# Patient Record
Sex: MALE | Race: WHITE | NOT HISPANIC OR LATINO | Employment: OTHER | URBAN - METROPOLITAN AREA
[De-identification: names, ages, dates, MRNs, and addresses within clinical notes are randomized per-mention and may not be internally consistent; named-entity substitution may affect disease eponyms.]

---

## 2018-08-09 ENCOUNTER — CONVERSION ENCOUNTER (OUTPATIENT)
Dept: CARDIOLOGY | Facility: CLINIC | Age: 58
End: 2018-08-09

## 2018-08-09 ENCOUNTER — OFFICE VISIT CONVERTED (OUTPATIENT)
Dept: CARDIOLOGY | Facility: CLINIC | Age: 58
End: 2018-08-09
Attending: INTERNAL MEDICINE

## 2018-08-23 ENCOUNTER — OFFICE VISIT CONVERTED (OUTPATIENT)
Dept: CARDIOLOGY | Facility: CLINIC | Age: 58
End: 2018-08-23
Attending: INTERNAL MEDICINE

## 2019-01-22 ENCOUNTER — OFFICE VISIT CONVERTED (OUTPATIENT)
Dept: FAMILY MEDICINE CLINIC | Facility: CLINIC | Age: 59
End: 2019-01-22
Attending: NURSE PRACTITIONER

## 2019-04-24 ENCOUNTER — OFFICE VISIT CONVERTED (OUTPATIENT)
Dept: CARDIOLOGY | Facility: CLINIC | Age: 59
End: 2019-04-24
Attending: INTERNAL MEDICINE

## 2019-04-24 ENCOUNTER — CONVERSION ENCOUNTER (OUTPATIENT)
Dept: CARDIOLOGY | Facility: CLINIC | Age: 59
End: 2019-04-24

## 2019-06-13 ENCOUNTER — CONVERSION ENCOUNTER (OUTPATIENT)
Dept: FAMILY MEDICINE CLINIC | Facility: CLINIC | Age: 59
End: 2019-06-13

## 2019-06-13 ENCOUNTER — OFFICE VISIT CONVERTED (OUTPATIENT)
Dept: FAMILY MEDICINE CLINIC | Facility: CLINIC | Age: 59
End: 2019-06-13
Attending: NURSE PRACTITIONER

## 2019-10-31 ENCOUNTER — HOSPITAL ENCOUNTER (OUTPATIENT)
Dept: OTHER | Facility: HOSPITAL | Age: 59
Discharge: HOME | End: 2019-10-31
Attending: INTERNAL MEDICINE

## 2019-10-31 LAB
ALBUMIN SERPL-MCNC: 4.3 G/DL (ref 3.5–5)
ALBUMIN/GLOB SERPL: 1.4 {RATIO} (ref 1.4–2.6)
ALP SERPL-CCNC: 88 U/L (ref 56–119)
ALT SERPL-CCNC: 39 U/L (ref 10–40)
ANION GAP SERPL CALC-SCNC: 18 MMOL/L (ref 8–19)
AST SERPL-CCNC: 28 U/L (ref 15–50)
BILIRUB SERPL-MCNC: 0.56 MG/DL (ref 0.2–1.3)
BUN SERPL-MCNC: 10 MG/DL (ref 5–25)
BUN/CREAT SERPL: 9 {RATIO} (ref 6–20)
CALCIUM SERPL-MCNC: 10 MG/DL (ref 8.7–10.4)
CHLORIDE SERPL-SCNC: 98 MMOL/L (ref 99–111)
CHOLEST SERPL-MCNC: 228 MG/DL (ref 107–200)
CHOLEST/HDLC SERPL: 2.9 {RATIO} (ref 3–6)
CONV CO2: 26 MMOL/L (ref 22–32)
CONV TOTAL PROTEIN: 7.3 G/DL (ref 6.3–8.2)
CREAT UR-MCNC: 1.07 MG/DL (ref 0.7–1.2)
GFR SERPLBLD BASED ON 1.73 SQ M-ARVRAT: >60 ML/MIN/{1.73_M2}
GLOBULIN UR ELPH-MCNC: 3 G/DL (ref 2–3.5)
GLUCOSE SERPL-MCNC: 126 MG/DL (ref 70–99)
HDLC SERPL-MCNC: 79 MG/DL (ref 40–60)
LDLC SERPL CALC-MCNC: 131 MG/DL (ref 70–100)
OSMOLALITY SERPL CALC.SUM OF ELEC: 285 MOSM/KG (ref 273–304)
POTASSIUM SERPL-SCNC: 5 MMOL/L (ref 3.5–5.3)
SODIUM SERPL-SCNC: 137 MMOL/L (ref 135–147)
TRIGL SERPL-MCNC: 89 MG/DL (ref 40–150)
VLDLC SERPL-MCNC: 18 MG/DL (ref 5–37)

## 2019-11-06 ENCOUNTER — OFFICE VISIT CONVERTED (OUTPATIENT)
Dept: CARDIOLOGY | Facility: CLINIC | Age: 59
End: 2019-11-06
Attending: INTERNAL MEDICINE

## 2020-01-29 ENCOUNTER — OFFICE VISIT CONVERTED (OUTPATIENT)
Dept: FAMILY MEDICINE CLINIC | Facility: CLINIC | Age: 60
End: 2020-01-29
Attending: FAMILY MEDICINE

## 2020-01-30 ENCOUNTER — HOSPITAL ENCOUNTER (OUTPATIENT)
Dept: OTHER | Facility: HOSPITAL | Age: 60
Discharge: HOME | End: 2020-01-30
Attending: FAMILY MEDICINE

## 2020-02-13 ENCOUNTER — OFFICE VISIT CONVERTED (OUTPATIENT)
Dept: FAMILY MEDICINE CLINIC | Facility: CLINIC | Age: 60
End: 2020-02-13
Attending: FAMILY MEDICINE

## 2020-02-26 ENCOUNTER — HOSPITAL ENCOUNTER (OUTPATIENT)
Dept: MRI IMAGING | Facility: HOSPITAL | Age: 60
Discharge: HOME | End: 2020-02-26
Attending: FAMILY MEDICINE

## 2020-02-27 ENCOUNTER — OFFICE VISIT CONVERTED (OUTPATIENT)
Dept: ORTHOPEDIC SURGERY | Facility: CLINIC | Age: 60
End: 2020-02-27
Attending: PHYSICIAN ASSISTANT

## 2020-02-27 ENCOUNTER — CONVERSION ENCOUNTER (OUTPATIENT)
Dept: ORTHOPEDIC SURGERY | Facility: CLINIC | Age: 60
End: 2020-02-27

## 2020-03-04 ENCOUNTER — HOSPITAL ENCOUNTER (OUTPATIENT)
Dept: FAMILY MEDICINE CLINIC | Facility: CLINIC | Age: 60
Discharge: HOME | End: 2020-03-04
Attending: FAMILY MEDICINE

## 2020-03-04 LAB
ALBUMIN SERPL-MCNC: 3.7 G/DL (ref 3.5–5)
ALBUMIN/GLOB SERPL: 1.2 {RATIO} (ref 1.4–2.6)
ALP SERPL-CCNC: 76 U/L (ref 56–119)
ALT SERPL-CCNC: 43 U/L (ref 10–40)
ANION GAP SERPL CALC-SCNC: 16 MMOL/L (ref 8–19)
AST SERPL-CCNC: 30 U/L (ref 15–50)
BASOPHILS # BLD AUTO: 0.03 10*3/UL (ref 0–0.2)
BASOPHILS NFR BLD AUTO: 0.6 % (ref 0–3)
BILIRUB SERPL-MCNC: 0.31 MG/DL (ref 0.2–1.3)
BUN SERPL-MCNC: 13 MG/DL (ref 5–25)
BUN/CREAT SERPL: 14 {RATIO} (ref 6–20)
CALCIUM SERPL-MCNC: 9.6 MG/DL (ref 8.7–10.4)
CHLORIDE SERPL-SCNC: 101 MMOL/L (ref 99–111)
CHOLEST SERPL-MCNC: 216 MG/DL (ref 107–200)
CHOLEST/HDLC SERPL: 2.6 {RATIO} (ref 3–6)
CONV ABS IMM GRAN: 0.02 10*3/UL (ref 0–0.2)
CONV CO2: 26 MMOL/L (ref 22–32)
CONV IMMATURE GRAN: 0.4 % (ref 0–1.8)
CONV TOTAL PROTEIN: 6.8 G/DL (ref 6.3–8.2)
CREAT UR-MCNC: 0.9 MG/DL (ref 0.7–1.2)
DEPRECATED RDW RBC AUTO: 38.6 FL (ref 35.1–43.9)
EOSINOPHIL # BLD AUTO: 0.12 10*3/UL (ref 0–0.7)
EOSINOPHIL # BLD AUTO: 2.4 % (ref 0–7)
ERYTHROCYTE [DISTWIDTH] IN BLOOD BY AUTOMATED COUNT: 11.8 % (ref 11.6–14.4)
GFR SERPLBLD BASED ON 1.73 SQ M-ARVRAT: >60 ML/MIN/{1.73_M2}
GLOBULIN UR ELPH-MCNC: 3.1 G/DL (ref 2–3.5)
GLUCOSE SERPL-MCNC: 132 MG/DL (ref 70–99)
HCT VFR BLD AUTO: 49.3 % (ref 42–52)
HDLC SERPL-MCNC: 82 MG/DL (ref 40–60)
HGB BLD-MCNC: 16 G/DL (ref 14–18)
LDLC SERPL CALC-MCNC: 119 MG/DL (ref 70–100)
LYMPHOCYTES # BLD AUTO: 0.98 10*3/UL (ref 1–5)
LYMPHOCYTES NFR BLD AUTO: 19.3 % (ref 20–45)
MCH RBC QN AUTO: 29.4 PG (ref 27–31)
MCHC RBC AUTO-ENTMCNC: 32.5 G/DL (ref 33–37)
MCV RBC AUTO: 90.5 FL (ref 80–96)
MONOCYTES # BLD AUTO: 0.52 10*3/UL (ref 0.2–1.2)
MONOCYTES NFR BLD AUTO: 10.2 % (ref 3–10)
NEUTROPHILS # BLD AUTO: 3.41 10*3/UL (ref 2–8)
NEUTROPHILS NFR BLD AUTO: 67.1 % (ref 30–85)
NRBC CBCN: 0 % (ref 0–0.7)
OSMOLALITY SERPL CALC.SUM OF ELEC: 288 MOSM/KG (ref 273–304)
PLATELET # BLD AUTO: 204 10*3/UL (ref 130–400)
PMV BLD AUTO: 10.2 FL (ref 9.4–12.4)
POTASSIUM SERPL-SCNC: 4.8 MMOL/L (ref 3.5–5.3)
PSA SERPL-MCNC: 0.95 NG/ML (ref 0–4)
RBC # BLD AUTO: 5.45 10*6/UL (ref 4.7–6.1)
SODIUM SERPL-SCNC: 138 MMOL/L (ref 135–147)
TRIGL SERPL-MCNC: 73 MG/DL (ref 40–150)
VLDLC SERPL-MCNC: 15 MG/DL (ref 5–37)
WBC # BLD AUTO: 5.08 10*3/UL (ref 4.8–10.8)

## 2020-03-11 ENCOUNTER — OFFICE VISIT CONVERTED (OUTPATIENT)
Dept: CARDIOLOGY | Facility: CLINIC | Age: 60
End: 2020-03-11
Attending: INTERNAL MEDICINE

## 2020-06-09 LAB — SARS-COV-2 RNA SPEC QL NAA+PROBE: NOT DETECTED

## 2020-06-10 ENCOUNTER — HOSPITAL ENCOUNTER (OUTPATIENT)
Dept: PERIOP | Facility: HOSPITAL | Age: 60
Setting detail: HOSPITAL OUTPATIENT SURGERY
Discharge: HOME | End: 2020-06-10
Attending: ORTHOPAEDIC SURGERY

## 2020-06-25 ENCOUNTER — CONVERSION ENCOUNTER (OUTPATIENT)
Dept: ORTHOPEDIC SURGERY | Facility: CLINIC | Age: 60
End: 2020-06-25

## 2020-06-25 ENCOUNTER — OFFICE VISIT CONVERTED (OUTPATIENT)
Dept: ORTHOPEDIC SURGERY | Facility: CLINIC | Age: 60
End: 2020-06-25
Attending: PHYSICIAN ASSISTANT

## 2020-07-23 ENCOUNTER — OFFICE VISIT CONVERTED (OUTPATIENT)
Dept: ORTHOPEDIC SURGERY | Facility: CLINIC | Age: 60
End: 2020-07-23
Attending: PHYSICIAN ASSISTANT

## 2020-09-03 ENCOUNTER — OFFICE VISIT CONVERTED (OUTPATIENT)
Dept: ORTHOPEDIC SURGERY | Facility: CLINIC | Age: 60
End: 2020-09-03
Attending: PHYSICIAN ASSISTANT

## 2020-10-09 ENCOUNTER — HOSPITAL ENCOUNTER (OUTPATIENT)
Dept: OTHER | Facility: HOSPITAL | Age: 60
Discharge: HOME | End: 2020-10-09
Attending: INTERNAL MEDICINE

## 2020-10-09 LAB
ALBUMIN SERPL-MCNC: 4.1 G/DL (ref 3.5–5)
ALBUMIN/GLOB SERPL: 1.4 {RATIO} (ref 1.4–2.6)
ALP SERPL-CCNC: 105 U/L (ref 56–119)
ALT SERPL-CCNC: 39 U/L (ref 10–40)
ANION GAP SERPL CALC-SCNC: 16 MMOL/L (ref 8–19)
AST SERPL-CCNC: 37 U/L (ref 15–50)
BILIRUB SERPL-MCNC: 0.35 MG/DL (ref 0.2–1.3)
BUN SERPL-MCNC: 12 MG/DL (ref 5–25)
BUN/CREAT SERPL: 11 {RATIO} (ref 6–20)
CALCIUM SERPL-MCNC: 9.8 MG/DL (ref 8.7–10.4)
CHLORIDE SERPL-SCNC: 100 MMOL/L (ref 99–111)
CHOLEST SERPL-MCNC: 169 MG/DL (ref 107–200)
CHOLEST/HDLC SERPL: 1.7 {RATIO} (ref 3–6)
CONV CO2: 22 MMOL/L (ref 22–32)
CONV TOTAL PROTEIN: 7 G/DL (ref 6.3–8.2)
CREAT UR-MCNC: 1.09 MG/DL (ref 0.7–1.2)
GFR SERPLBLD BASED ON 1.73 SQ M-ARVRAT: >60 ML/MIN/{1.73_M2}
GLOBULIN UR ELPH-MCNC: 2.9 G/DL (ref 2–3.5)
GLUCOSE SERPL-MCNC: 120 MG/DL (ref 70–99)
HDLC SERPL-MCNC: 100 MG/DL (ref 40–60)
LDLC SERPL CALC-MCNC: 59 MG/DL (ref 70–100)
OSMOLALITY SERPL CALC.SUM OF ELEC: 277 MOSM/KG (ref 273–304)
POTASSIUM SERPL-SCNC: 5.2 MMOL/L (ref 3.5–5.3)
SODIUM SERPL-SCNC: 133 MMOL/L (ref 135–147)
TRIGL SERPL-MCNC: 49 MG/DL (ref 40–150)
VLDLC SERPL-MCNC: 10 MG/DL (ref 5–37)

## 2020-10-16 ENCOUNTER — OFFICE VISIT CONVERTED (OUTPATIENT)
Dept: CARDIOLOGY | Facility: CLINIC | Age: 60
End: 2020-10-16
Attending: INTERNAL MEDICINE

## 2020-10-30 ENCOUNTER — TELEMEDICINE CONVERTED (OUTPATIENT)
Dept: ORTHOPEDIC SURGERY | Facility: CLINIC | Age: 60
End: 2020-10-30
Attending: PHYSICIAN ASSISTANT

## 2021-03-17 ENCOUNTER — CONVERSION ENCOUNTER (OUTPATIENT)
Dept: FAMILY MEDICINE CLINIC | Facility: CLINIC | Age: 61
End: 2021-03-17

## 2021-03-17 ENCOUNTER — OFFICE VISIT CONVERTED (OUTPATIENT)
Dept: FAMILY MEDICINE CLINIC | Facility: CLINIC | Age: 61
End: 2021-03-17
Attending: FAMILY MEDICINE

## 2021-05-13 ENCOUNTER — OFFICE VISIT CONVERTED (OUTPATIENT)
Dept: CARDIOLOGY | Facility: CLINIC | Age: 61
End: 2021-05-13
Attending: INTERNAL MEDICINE

## 2021-05-13 NOTE — PROGRESS NOTES
"   Progress Note      Patient Name: Carlos Manuel Sanchez   Patient ID: [id number]   Sex: Male   YOB: 1960    Primary Care Provider: Kurt Arango DO   Referring Provider: Jeanie NUNEZ    Visit Date: October 16, 2020    Provider: Haris Clements MD   Location: [facility]   Location Address: [address] [place] [address], [address]   [place]  [id number]   Location Phone: [phone]          Chief Complaint  · Coronary artery disease       History Of Present Illness  REFERRING CARE PROVIDER: Kurt Arango DO   Carlos Manuel Sanchez is a 59 year old /White gentleman with known coronary artery disease, previous stent, dyslipidemia, who denies any complaints or problems.   PAST MEDICAL HISTORY: Coronary artery disease with stent in 2000; dyslipidemia; hypertension.   PSYCHOSOCIAL HISTORY: He drinks a moderate amount of alcohol and never used tobacco.   CURRENT MEDICATIONS: include Repatha 140 mg biweekly; Rosuvastatin 20 mg daily; Atenolol 25 mg daily; Lisinopril 20 mg daily; ASA 81 mg daily. The dosage and frequency of the medications were reviewed with the patient.       Review of Systems  · Cardiovascular  o Denies  o : palpitations (fast, fluttering, or skipping beats), swelling (feet, ankles, hands), shortness of breath while walking or lying flat, chest pain or angina pectoris   · Respiratory  o Denies  o : chronic or frequent cough, asthma or wheezing      Vitals  Date Time BP Position Site L\R Cuff Size HR RR TEMP (F) WT  HT  BMI kg/m2 BSA m2 O2 Sat FR L/min FiO2 HC       10/16/2020 08:24 /84 Sitting       203lbs 0oz 5'  10\" 29.13 2.13             Physical Examination  · Constitutional  o Appearance  o : Awake, alert, in no acute distress.   · Eyes  o Conjunctivae  o : Normal.  · Ears, Nose, Mouth and Throat  o Oral Cavity  o :   § Oral Mucosa  § : Normal.  · Neck  o Inspection/Palpation  o : No JVD. Good carotid upstroke. No thyromegaly.  · Respiratory  o Respiratory  o : Good " respiratory effort. Clear to percussion and auscultation.  · Cardiovascular  o Heart  o :   § Auscultation of Heart  § : S1, S2 normal. Regular rate and rhythm without murmurs, gallops, or rubs.  o Peripheral Vascular System  o :   § Extremities  § : Good femoral and pedal pulses. No pedal edema.  · Gastrointestinal  o Abdominal Examination  o : Soft. No tenderness or masses felt. No hepatosplenomegaly. Abdominal aorta is not palpable.     EKG was performed in the office today.  Results:         normal sinus rhythm with some left anterior fascicular block.  Comparison:   No change from prior EKGs.    LDL 59, .  AST 37, ALT 39.           Assessment     ASSESSMENT AND PLAN:    1.  Coronary artery disease with previous stenting:  No angina and on chronic ASA 81 mg once a day.  2.  Hyperlipidemia:  On both statin and Repatha and tolerating well.  LDL at goal.    MD Sugey Meadows/shaniqua         This note was transcribed by Akosua Forrest.  shaniqua/sugey   The above service was transcribed by Akosua Forrest, and I attest to the accuracy of the note.  SUGEY.               Electronically Signed by: Akosua Forrest-, -Author on October 19, 2020 10:55:38 AM  Electronically Co-signed by: Haris Clements MD -Reviewer on October 22, 2020 08:02:17 AM

## 2021-05-13 NOTE — PROGRESS NOTES
Progress Note      Patient Name: Carlos Manuel Sanchez   Patient ID: [id number]   Sex: Male   YOB: 1960    Primary Care Provider: Kurt Arango DO   Referring Provider: Kurt Arango DO    Visit Date: September 3, 2020    Provider: Molly Ovalles PA-C   Location: [facility]   Location Address: [address] [place] [address]  [id number]   Location Phone: [phone]          Chief Complaint  · Follow up left shoulder arthroscopy      History Of Present Illness  Carlos Manuel Sanchez is a 59 year old /White male who presents today to [facility].      He is s/p left arthroscopic SAD/DC mini open RCR 6/10/20 by Dr. James. He is doing well. He is compliant with sling and PT. He states pain has improved since last visit. He denies wound complications, numbness/tingling. He is making progress in PT with ROM and strength.               Past Medical History  Heart Disease; HLD (hyperlipidemia); HTN (hypertension); Hyperlipemia; Hypertension         Past Surgical History  Appendectomy; Stent placment         Medication List  Aspir-81 81 mg oral tablet,delayed release (DR/EC); atenolol 25 mg oral tablet; Claritin 10 mg oral tablet; lisinopril 20 mg oral tablet; Norco 7.5-325 mg oral tablet; Repatha SureClick 140 mg/mL subcutaneous pen injector; rosuvastatin 20 mg oral tablet; Zetia 10 mg oral tablet         Allergy List  NO KNOWN DRUG ALLERGIES; NO KNOWN DRUG ALLERGIES       Allergies Reconciled  Family Medical History  Heart Disease; Cancer, Unspecified; Lung cancer         Social History  Alcohol Use (Current some day); lives with spouse; .; Recreational Drug Use (Never); Tobacco (Never); Working         Review of Systems  · Constitutional  o Denies  o : fever, chills, weight loss  · Cardiovascular  o Denies  o : chest pain, shortness of breath  · Gastrointestinal  o Denies  o : liver disease, heartburn, nausea, blood in stools  · Genitourinary  o Denies  o : painful  "urination, blood in urine  · Integument  o Denies  o : rash, itching  · Neurologic  o Denies  o : headache, weakness, loss of consciousness  · Musculoskeletal  o Admits  o : painful, swollen joints  · Psychiatric  o Denies  o : drug/alcohol addiction, anxiety, depression      Vitals  Date Time BP Position Site L\R Cuff Size HR RR TEMP (F) WT  HT  BMI kg/m2 BSA m2 O2 Sat        09/03/2020 08:41 AM      64 - R   203lbs 16oz 5'  10\" 29.27 2.14 94 %          Physical Examination  · Constitutional  o Appearance  o : well developed, well-nourished, no obvious deformities present  · Head and Face  o Head  o :   § Inspection  § : normocephalic  o Face  o :   § Inspection  § : no facial lesions  · Eyes  o Conjunctivae  o : conjunctivae normal  o Sclerae  o : sclerae white  · Ears, Nose, Mouth and Throat  o Ears  o :   § External Ears  § : appearance within normal limits  § Hearing  § : intact  o Nose  o :   § External Nose  § : appearance normal  · Neck  o Inspection/Palpation  o : normal appearance  o Range of Motion  o : full range of motion  · Respiratory  o Respiratory Effort  o : breathing unlabored  o Inspection of Chest  o : normal appearance  o Auscultation of Lungs  o : no audible wheezing or rales  · Cardiovascular  o Heart  o : regular rate  · Gastrointestinal  o Abdominal Examination  o : soft and non-tender  · Skin and Subcutaneous Tissue  o General Inspection  o : intact, no rashes  · Psychiatric  o General  o : Alert and oriented x3  o Judgement and Insight  o : judgment and insight intact  o Mood and Affect  o : mood normal, affect appropriate  · Left Shoulder  o Inspection  o : Well healed incisions. Forward flexion 100 degrees. Abduction 90 degrees. IR to back pocket. ER 30 degrees. Strength decreased. Neurovascularly intact.           Assessment  · Left Aftercare following surgery of the muskuloskeletal system     V54.81      Plan  · Medications  o Medications have been Reconciled  o Transition of Care " or Provider Policy  · Instructions  o Reviewed the patient's Past Medical, Social, and Family history as well as the ROS at today's visit, no changes.  o Call or return if worsening symptoms.  o Continue PT. Follow up 6-8 weeks.   o Electronically Identified Patient Education Materials Provided Electronically  · Referrals  o ID: [id number] Date: 02/26/2020 Type: Inbound  Specialty: Orthopedic Surgery            Electronically Signed by: KORI Holden-MARIA LUISA -Author on September 3, 2020 09:04:53 AM  Electronically Co-signed by: Lisa James MD -Reviewer on September 8, 2020 08:18:34 AM

## 2021-05-13 NOTE — PROGRESS NOTES
Progress Note      Patient Name: Carlos Manuel Sanchez   Patient ID: [id number]   Sex: Male   YOB: 1960    Primary Care Provider: Kurt Arango DO   Referring Provider: Kurt Arango DO    Visit Date: October 30, 2020    Provider: Molly Ovalles PA-C   Location: [facility]   Location Address: [address] [place] [address]  [id number]   Location Phone: [phone]          Chief Complaint  · Follow up left shoulder arthroscopy      History Of Present Illness  Video Conferencing Visit  Carlos Manuel aSnchez is a 60 year old /White male who is presenting for evaluation via video conferencing via [employer]. Verbal consent obtained before beginning visit.   The following staff were present during this visit: Molly Ovalles PA-C   Carlos Manuel Sanchez is a 60 year old /White male who presents today to [facility].      He is s/p left arthroscopic SAD/DC mini open RCR 6/10/20 by Dr. James. He has made a favorable recovery. He has no pain. He has been compliant with PT. He is happy with his outcome. 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       Past Medical History  Heart Disease; HLD (hyperlipidemia); HTN (hypertension); Hyperlipemia; Hypertension         Past Surgical History  Appendectomy; Stent placment         Medication List  Aspir-81 81 mg oral tablet,delayed release (DR/EC); atenolol 25 mg oral tablet; Claritin 10 mg oral tablet; lisinopril 20 mg oral tablet; Norco 7.5-325 mg oral tablet; Repatha SureClick 140 mg/mL subcutaneous pen injector; rosuvastatin 20 mg oral tablet; Zetia 10 mg oral tablet         Allergy List  NO KNOWN DRUG ALLERGIES; NO KNOWN DRUG ALLERGIES       Allergies Reconciled  Family Medical History  Heart Disease; Cancer, Unspecified; Lung cancer         Social History  Alcohol Use (Current some day); lives with spouse; .; Recreational Drug Use (Never); Tobacco (Never); Working         Review of  "Systems  · Constitutional  o Denies  o : fever, chills, weight loss  · Cardiovascular  o Denies  o : chest pain, shortness of breath  · Gastrointestinal  o Denies  o : liver disease, heartburn, nausea, blood in stools  · Genitourinary  o Denies  o : painful urination, blood in urine  · Integument  o Denies  o : rash, itching  · Neurologic  o Denies  o : headache, weakness, loss of consciousness  · Musculoskeletal  o Denies  o : painful, swollen joints  · Psychiatric  o Denies  o : drug/alcohol addiction, anxiety, depression      Vitals  Date Time BP Position Site L\R Cuff Size HR RR TEMP (F) WT  HT  BMI kg/m2 BSA m2 O2 Sat FR L/min FiO2 HC       10/30/2020 08:46 AM         194lbs 16oz 5'  10\" 27.98 2.09             Physical Examination  · Constitutional  o Appearance  o : well developed, well-nourished, no obvious deformities present  · Head and Face  o Head  o :   § Inspection  § : normocephalic  o Face  o :   § Inspection  § : no facial lesions  · Eyes  o Conjunctivae  o : conjunctivae normal  o Sclerae  o : sclerae white  · Ears, Nose, Mouth and Throat  o Ears  o :   § External Ears  § : appearance within normal limits  § Hearing  § : intact  o Nose  o :   § External Nose  § : appearance normal  · Neck  o Inspection/Palpation  o : normal appearance  o Range of Motion  o : full range of motion  · Respiratory  o Respiratory Effort  o : breathing unlabored  o Inspection of Chest  o : normal appearance  o Auscultation of Lungs  o : no audible wheezing or rales  · Gastrointestinal  o Abdominal Examination  o : no obvious deformity   · Skin and Subcutaneous Tissue  o General Inspection  o : intact, no rashes  · Psychiatric  o General  o : Alert and oriented x3  o Judgement and Insight  o : judgment and insight intact  o Mood and Affect  o : mood normal, affect appropriate  · Left Shoulder  o Inspection  o : Well healed incisions. Forward flexion 170 degrees. Abduction 170 degrees. Full IR/E compared to opposite side. "           Assessment  · History of repair of left rotator cuff     V15.29/Z98.890      Plan  · Medications  o Medications have been Reconciled  o Transition of Care or Provider Policy  · Instructions  o Reviewed the patient's Past Medical, Social, and Family history as well as the ROS at today's visit, no changes.  o Call or return if worsening symptoms.  o Patient may d/c PT, with understanding of continuing prescribed HEP. Discussed precautions. Increase activity gradually as tolerated. Follow up PRN.   o Patient may return to work, full duty without restrictions 11/2/20.   o Electronically Identified Patient Education Materials Provided Electronically            Electronically Signed by: CHARLINE HoldenC -Author on October 30, 2020 09:05:23 AM

## 2021-05-13 NOTE — PROGRESS NOTES
Progress Note      Patient Name: Carlos Manuel Sanchez   Patient ID: [id number]   Sex: Male   YOB: 1960    Primary Care Provider: Kurt Arango DO   Referring Provider: Kurt Arango DO    Visit Date: June 25, 2020    Provider: Molly Ovalles PA-C   Location: [facility]   Location Address: [address] [place] [address]  [id number]   Location Phone: [phone]          Chief Complaint  · Surgery follow up left shoulder arthroscopy      History Of Present Illness  Carlos Manuel Sanchez is a 59 year old /White male who presents today to [facility].      He is s/p left arthroscopic SAD/DC mini open RCR 6/10/20 by Dr. James. He is doing well. He is compliant with sling and PT.       Past Medical History  Heart Disease; HLD (hyperlipidemia); HTN (hypertension); Hyperlipemia; Hypertension         Past Surgical History  Appendectomy; Stent placment         Medication List  Aspir-81 81 mg oral tablet,delayed release (/EC); atenolol 25 mg oral tablet; Claritin 10 mg oral tablet; lisinopril 20 mg oral tablet; Repatha SureClick 140 mg/mL subcutaneous pen injector; rosuvastatin 20 mg oral tablet; Zetia 10 mg oral tablet         Allergy List  NO KNOWN DRUG ALLERGIES; NO KNOWN DRUG ALLERGIES       Allergies Reconciled  Family Medical History  Heart Disease; Cancer, Unspecified; Lung cancer         Social History  Alcohol Use (Current some day); lives with spouse; .; Recreational Drug Use (Never); Tobacco (Never); Working         Review of Systems  · Constitutional  o Denies  o : fever, chills, weight loss  · Cardiovascular  o Denies  o : chest pain, shortness of breath  · Gastrointestinal  o Denies  o : liver disease, heartburn, nausea, blood in stools  · Genitourinary  o Denies  o : painful urination, blood in urine  · Integument  o Denies  o : rash, itching  · Neurologic  o Denies  o : headache, weakness, loss of consciousness  · Musculoskeletal  o Admits  o : painful, swollen  "joints  · Psychiatric  o Denies  o : drug/alcohol addiction, anxiety, depression      Vitals  Date Time BP Position Site L\R Cuff Size HR RR TEMP (F) WT  HT  BMI kg/m2 BSA m2 O2 Sat HC       06/25/2020 11:04 AM      68 - R   198lbs 0oz 5'  10\" 28.41 2.11 97 %          Physical Examination  · Constitutional  o Appearance  o : well developed, well-nourished, no obvious deformities present  · Head and Face  o Head  o :   § Inspection  § : normocephalic  o Face  o :   § Inspection  § : no facial lesions  · Eyes  o Conjunctivae  o : conjunctivae normal  o Sclerae  o : sclerae white  · Ears, Nose, Mouth and Throat  o Ears  o :   § External Ears  § : appearance within normal limits  § Hearing  § : intact  o Nose  o :   § External Nose  § : appearance normal  · Neck  o Inspection/Palpation  o : normal appearance  o Range of Motion  o : full range of motion  · Respiratory  o Respiratory Effort  o : breathing unlabored  o Inspection of Chest  o : normal appearance  o Auscultation of Lungs  o : no audible wheezing or rales  · Cardiovascular  o Heart  o : regular rate  · Gastrointestinal  o Abdominal Examination  o : soft and non-tender  · Skin and Subcutaneous Tissue  o General Inspection  o : intact, no rashes  · Psychiatric  o General  o : Alert and oriented x3  o Judgement and Insight  o : judgment and insight intact  o Mood and Affect  o : mood normal, affect appropriate  · Left Shoulder  o Inspection  o : Well healed incisions. No signs of infection. Decreased ROM. Full ROM of elbow, wrist, digits. Neurovascularly intact.           Assessment  · Left Aftercare following surgery of the muskuloskeletal system     V54.81      Plan  · Medications  o Medications have been Reconciled  o Transition of Care or Provider Policy  · Instructions  o Reviewed the patient's Past Medical, Social, and Family history as well as the ROS at today's visit, no changes.  o Call or return if worsening symptoms.  o Continue PT. Follow up 4 weeks. "   o Electronically Identified Patient Education Materials Provided Electronically  · Referrals  o ID: [id number] Date: 02/26/2020 Type: Inbound  Specialty: Orthopedic Surgery            Electronically Signed by: Molly Ovalles PA-C -Author on June 25, 2020 11:36:39 AM  Electronically Co-signed by: Lisa James MD -Reviewer on June 25, 2020 12:04:50 PM

## 2021-05-13 NOTE — PROGRESS NOTES
Progress Note      Patient Name: Carlos Manuel Sanchez   Patient ID: [id number]   Sex: Male   YOB: 1960    Primary Care Provider: Kurt Arango DO   Referring Provider: Kurt Arango DO    Visit Date: July 23, 2020    Provider: Molly Ovalles PA-C   Location: [facility]   Location Address: [address] [place] [address]  [id number]   Location Phone: [phone]          Chief Complaint  · Follow up left shoulder arthroscopy      History Of Present Illness  Carlos Manuel Sanchez is a 59 year old /White male who presents today to [facility].      He is s/p left arthroscopic SAD/DC mini open RCR 6/10/20 by Dr. James. He is doing well. He is compliant with sling and PT. He states pain has improved since last visit. He denies wound complications, numbness/tingling.             Past Medical History  Heart Disease; HLD (hyperlipidemia); HTN (hypertension); Hyperlipemia; Hypertension         Past Surgical History  Appendectomy; Stent placment         Medication List  Aspir-81 81 mg oral tablet,delayed release (/EC); atenolol 25 mg oral tablet; Claritin 10 mg oral tablet; lisinopril 20 mg oral tablet; Norco 7.5-325 mg oral tablet; Repatha SureClick 140 mg/mL subcutaneous pen injector; rosuvastatin 20 mg oral tablet; Zetia 10 mg oral tablet         Allergy List  NO KNOWN DRUG ALLERGIES; NO KNOWN DRUG ALLERGIES       Allergies Reconciled  Family Medical History  Heart Disease; Cancer, Unspecified; Lung cancer         Social History  Alcohol Use (Current some day); lives with spouse; .; Recreational Drug Use (Never); Tobacco (Never); Working         Review of Systems  · Constitutional  o Denies  o : fever, chills, weight loss  · Cardiovascular  o Denies  o : chest pain, shortness of breath  · Gastrointestinal  o Denies  o : liver disease, heartburn, nausea, blood in stools  · Genitourinary  o Denies  o : painful urination, blood in urine  · Integument  o Denies  o : rash,  "itching  · Neurologic  o Denies  o : headache, weakness, loss of consciousness  · Musculoskeletal  o Admits  o : painful, swollen joints  · Psychiatric  o Denies  o : drug/alcohol addiction, anxiety, depression      Vitals  Date Time BP Position Site L\R Cuff Size HR RR TEMP (F) WT  HT  BMI kg/m2 BSA m2 O2 Sat        07/23/2020 09:49 AM      90 - R   203lbs 0oz 5'  10\" 29.13 2.13 96 %          Physical Examination  · Constitutional  o Appearance  o : well developed, well-nourished, no obvious deformities present  · Head and Face  o Head  o :   § Inspection  § : normocephalic  o Face  o :   § Inspection  § : no facial lesions  · Eyes  o Conjunctivae  o : conjunctivae normal  o Sclerae  o : sclerae white  · Ears, Nose, Mouth and Throat  o Ears  o :   § External Ears  § : appearance within normal limits  § Hearing  § : intact  o Nose  o :   § External Nose  § : appearance normal  · Neck  o Inspection/Palpation  o : normal appearance  o Range of Motion  o : full range of motion  · Respiratory  o Respiratory Effort  o : breathing unlabored  o Inspection of Chest  o : normal appearance  o Auscultation of Lungs  o : no audible wheezing or rales  · Cardiovascular  o Heart  o : regular rate  · Gastrointestinal  o Abdominal Examination  o : soft and non-tender  · Skin and Subcutaneous Tissue  o General Inspection  o : intact, no rashes  · Psychiatric  o General  o : Alert and oriented x3  o Judgement and Insight  o : judgment and insight intact  o Mood and Affect  o : mood normal, affect appropriate  · Left Shoulder  o Inspection  o : Incisions well healed. Limited ROM. Strength 3+/4. Neurovascularly intact.           Assessment  · Left Aftercare following surgery of the muskuloskeletal system     V54.81      Plan  · Medications  o Medications have been Reconciled  o Transition of Care or Provider Policy  · Instructions  o Reviewed the patient's Past Medical, Social, and Family history as well as the ROS at today's visit, " no changes.  o Call or return if worsening symptoms.  o continue PT. May dc sling. Follow up 6 weeks. Norco prescribed.   o Electronically Identified Patient Education Materials Provided Electronically  · Referrals  o ID: [id number] Date: 02/26/2020 Type: Inbound  Specialty: Orthopedic Surgery            Electronically Signed by: KORI Holden-C -Author on July 23, 2020 10:41:19 AM  Electronically Co-signed by: Lisa James MD -Reviewer on July 23, 2020 03:57:18 PM

## 2021-05-14 VITALS
SYSTOLIC BLOOD PRESSURE: 132 MMHG | HEART RATE: 68 BPM | WEIGHT: 205.56 LBS | DIASTOLIC BLOOD PRESSURE: 82 MMHG | HEIGHT: 70 IN | TEMPERATURE: 98 F | BODY MASS INDEX: 29.43 KG/M2 | OXYGEN SATURATION: 96 %

## 2021-05-14 VITALS — HEIGHT: 70 IN | WEIGHT: 195 LBS | BODY MASS INDEX: 27.92 KG/M2

## 2021-05-14 VITALS
BODY MASS INDEX: 29.06 KG/M2 | HEIGHT: 70 IN | SYSTOLIC BLOOD PRESSURE: 136 MMHG | DIASTOLIC BLOOD PRESSURE: 84 MMHG | WEIGHT: 203 LBS

## 2021-05-14 VITALS — WEIGHT: 204 LBS | HEIGHT: 70 IN | OXYGEN SATURATION: 94 % | BODY MASS INDEX: 29.2 KG/M2 | HEART RATE: 64 BPM

## 2021-05-14 NOTE — PROGRESS NOTES
Progress Note      Patient Name: Carlos Manuel Sanchez   Patient ID: [id number]   Sex: Male   YOB: 1960    Primary Care Provider: Kurt Arango DO   Referring Provider: Kurt Arango DO    Visit Date: March 17, 2021    Provider: Kurt Arango DO   Location: [facility] [place]   Location Address: [address] [place] [address], [address] [place] [address]  [id number]   Location Phone: [phone]          Chief Complaint  · mole on head      History Of Present Illness  Carlos Manuel Sanchez is a 60 year old /White male who presents for evaluation and treatment of:      Patient presents today for an acute visit.  He reports having a mole on his head that has been present for several years and has gotten larger.  Sometimes it hurts.  It does not bleed.  He also is having issues with erectile dysfunction.  He is requesting Viagra.  I have not seen him in about a year.  We discussed doing a follow-up in 3 months with labs done prior to next appointment.       Past Medical History  Heart Disease; HLD (hyperlipidemia); HTN (hypertension); Hyperlipemia; Hypertension         Past Surgical History  Appendectomy; Stent placment         Medication List  Aspir-81 81 mg oral tablet,delayed release (DR/EC); atenolol 25 mg oral tablet; lisinopril 20 mg oral tablet; Repatha SureClick 140 mg/mL subcutaneous pen injector; rosuvastatin 20 mg oral tablet         Allergy List  NO KNOWN DRUG ALLERGIES; NO KNOWN DRUG ALLERGIES       Allergies Reconciled  Family Medical History  Heart Disease; Cancer, Unspecified; Lung cancer         Social History  Alcohol Use (Current some day); lives with spouse; .; Recreational Drug Use (Never); Tobacco (Never); Working         Immunizations  Name Date Admin   Influenza 10/09/2020   Influenza Refused         Review of Systems     Gen: Denies any fever, chills, or weight changes  Skin: As discussed above       Vitals  Date Time BP Position Site L\R Cuff Size HR RR TEMP (F) WT  " HT  BMI kg/m2 BSA m2 O2 Sat FR L/min FiO2 HC       03/17/2021 03:03 /82 Sitting    68 - R  98 205lbs 9oz 5'  10\" 29.49 2.15 96 %            Physical Examination     General: AAO 3, no acute distress, pleasant  HEENT: Normocephalic, atraumatic  Skin: Hyperkeratotic flesh-colored lesion that is slightly scaly noted on the scalp.  It measures 9 x 5 mm.  Appears consistent with a seborrheic keratosis      Risk and benefits of cryotherapy for seborrheic keratosis were discussed with the patient prior to initiating the procedure.  Patient was given opportunity to ask questions.  Patient provided verbal consent to treatment.  The area of concern was treated with 4 rounds of cryotherapy.  Patient overall tolerated the procedure well.  There were no immediate complications.  A total of 1 lesion was treated.           Assessment  · Screening for prostate cancer     V76.44/Z12.5  · Seborrheic keratosis     702.19/L82.1  · Erectile dysfunction     607.84/N52.9  · CAD (coronary artery disease)     414.00/I25.10  · Medication monitoring encounter     V58.83/Z51.81  · Hypertension     401.9/I10  · HLD (hyperlipidemia)     272.4/E78.5      Plan  · Orders  o PSA Ultrasensitive, ANNUAL SCREENING [facility] (25785, ) - V76.44/Z12.5 - 06/17/2021  o CBC with Auto Diff [facility] (04130) - V58.83/Z51.81, 401.9/I10 - 06/17/2021  o CMP [facility] (44552) - V58.83/Z51.81, 401.9/I10 - 06/17/2021  o Lipid Panel [facility] (17960) - V58.83/Z51.81, 401.9/I10 - 06/17/2021  o ACO-39: Current medications updated and reviewed (1159F, ) - - 03/17/2021  o Destruction of 1 to 14 benign lesions (25152) - 702.19/L82.1 - 03/17/2021  · Medications  o sildenafil 100 mg oral tablet   SIG: take 1 tablet (100 mg) by oral route once daily as needed approximately 1 hour before sexual activity for 30 days   DISP: (30) Tablet with 1 refills  Prescribed on 03/17/2021     o Medications have been Reconciled  o Transition of Care or Provider Policy  · Instructions  o Patient " was educated/instructed on their diagnosis, treatment and medications prior to discharge from the clinic today.  o Patient instructed to seek medical attention urgently for new or worsening symptoms.  o Call the office with any concerns or questions.  o Benign nature of seborrheic keratosis discussed with patient. Post cryotherapy instructions have been given. I will place him on Viagra. Patient instructed to call with any questions or concerns. Labs to be updated prior to next appointment.   · Disposition  o Follow Up in 3 months.            Electronically Signed by: Kurt Arango DO - on March 17, 2021 05:48:36 PM

## 2021-05-15 VITALS
BODY MASS INDEX: 29.06 KG/M2 | HEIGHT: 70 IN | WEIGHT: 203 LBS | SYSTOLIC BLOOD PRESSURE: 130 MMHG | DIASTOLIC BLOOD PRESSURE: 80 MMHG | HEART RATE: 56 BPM

## 2021-05-15 VITALS
DIASTOLIC BLOOD PRESSURE: 72 MMHG | TEMPERATURE: 97.9 F | HEIGHT: 70 IN | OXYGEN SATURATION: 97 % | BODY MASS INDEX: 29.85 KG/M2 | SYSTOLIC BLOOD PRESSURE: 134 MMHG | HEART RATE: 72 BPM | WEIGHT: 208.5 LBS

## 2021-05-15 VITALS
HEART RATE: 58 BPM | BODY MASS INDEX: 29.63 KG/M2 | HEIGHT: 70 IN | DIASTOLIC BLOOD PRESSURE: 78 MMHG | SYSTOLIC BLOOD PRESSURE: 130 MMHG | WEIGHT: 207 LBS

## 2021-05-15 VITALS
DIASTOLIC BLOOD PRESSURE: 68 MMHG | HEIGHT: 70 IN | HEART RATE: 58 BPM | OXYGEN SATURATION: 96 % | SYSTOLIC BLOOD PRESSURE: 126 MMHG | BODY MASS INDEX: 29.81 KG/M2 | WEIGHT: 208.25 LBS | TEMPERATURE: 98.2 F

## 2021-05-15 VITALS
TEMPERATURE: 98.1 F | DIASTOLIC BLOOD PRESSURE: 82 MMHG | BODY MASS INDEX: 28.85 KG/M2 | HEART RATE: 66 BPM | OXYGEN SATURATION: 95 % | WEIGHT: 201.5 LBS | HEIGHT: 70 IN | SYSTOLIC BLOOD PRESSURE: 136 MMHG

## 2021-05-15 VITALS — HEART RATE: 90 BPM | WEIGHT: 203 LBS | HEIGHT: 70 IN | OXYGEN SATURATION: 96 % | BODY MASS INDEX: 29.06 KG/M2

## 2021-05-15 VITALS — HEIGHT: 70 IN | BODY MASS INDEX: 28.35 KG/M2 | WEIGHT: 198 LBS | OXYGEN SATURATION: 97 % | HEART RATE: 68 BPM

## 2021-05-15 VITALS
HEART RATE: 74 BPM | OXYGEN SATURATION: 98 % | WEIGHT: 207 LBS | TEMPERATURE: 98.1 F | HEIGHT: 70 IN | DIASTOLIC BLOOD PRESSURE: 72 MMHG | BODY MASS INDEX: 29.63 KG/M2 | SYSTOLIC BLOOD PRESSURE: 132 MMHG

## 2021-05-15 VITALS — HEART RATE: 57 BPM | WEIGHT: 210.12 LBS | BODY MASS INDEX: 30.08 KG/M2 | OXYGEN SATURATION: 97 % | HEIGHT: 70 IN

## 2021-05-15 VITALS
DIASTOLIC BLOOD PRESSURE: 86 MMHG | HEIGHT: 70 IN | SYSTOLIC BLOOD PRESSURE: 140 MMHG | WEIGHT: 202 LBS | BODY MASS INDEX: 28.92 KG/M2 | HEART RATE: 64 BPM

## 2021-05-16 VITALS
DIASTOLIC BLOOD PRESSURE: 76 MMHG | WEIGHT: 199 LBS | SYSTOLIC BLOOD PRESSURE: 134 MMHG | BODY MASS INDEX: 28.49 KG/M2 | HEART RATE: 52 BPM | HEIGHT: 70 IN

## 2021-05-24 ENCOUNTER — HOSPITAL ENCOUNTER (OUTPATIENT)
Dept: NUCLEAR MEDICINE | Facility: HOSPITAL | Age: 61
Discharge: HOME | End: 2021-05-24
Attending: INTERNAL MEDICINE

## 2021-05-28 ENCOUNTER — TRANSCRIBE ORDERS (OUTPATIENT)
Dept: CARDIOLOGY | Facility: CLINIC | Age: 61
End: 2021-05-28

## 2021-05-28 DIAGNOSIS — R07.9 CHEST PAIN, UNSPECIFIED TYPE: Primary | ICD-10-CM

## 2021-06-01 ENCOUNTER — HOSPITAL ENCOUNTER (OUTPATIENT)
Dept: OTHER | Facility: HOSPITAL | Age: 61
Discharge: HOME | End: 2021-06-01
Attending: INTERNAL MEDICINE

## 2021-06-01 LAB — BNP SERPL-MCNC: 98 PG/ML (ref 0–900)

## 2021-06-05 NOTE — PROGRESS NOTES
"   Progress Note      Patient Name: Carlos Manuel Sanchez   Patient ID: [id number]   Sex: Male   YOB: 1960    Primary Care Provider: Kurt Arango DO   Referring Provider: Gonzalo NUNEZ    Visit Date: May 13, 2021    Provider: Haris Clements MD   Location: [facility]   Location Address: [address] [place] [address], [address]   [place], [place]  [id number]   Location Phone: [phone]          Chief Complaint     CAD.       History Of Present Illness  REFERRING CARE PROVIDER: Gonzalo NUNEZ   Carlos Manuel Sanchez is a 60 year old /White male with coronary artery disease, prior stent, hypertension, and dyslipidemia who has had some increased shortness of breath with exertion over the last few months, also some dizziness with bending over and standing up, no chest pain problems.   PAST MEDICAL HISTORY: Coronary artery disease with stent in 2000; dyslipidemia; hypertension.   PSYCHOSOCIAL HISTORY: Moderate consumption of alcohol. Denies tobacco use.   CURRENT MEDICATIONS: Medications have been reviewed and are as stated.      ALLERGIES:  No known drug allergies.       Review of Systems  · Cardiovascular  o Admits  o : shortness of breath while walking or lying flat  o Denies  o : palpitations (fast, fluttering, or skipping beats), swelling (feet, ankles, hands), chest pain or angina pectoris   · Respiratory  o Denies  o : chronic or frequent cough      Vitals  Date Time BP Position Site L\R Cuff Size HR RR TEMP (F) WT  HT  BMI kg/m2 BSA m2 O2 Sat FR L/min FiO2 HC       05/13/2021 09:25 /66 Sitting    53 - R   200lbs 0oz 5'  10\" 28.7 2.12             Physical Examination  · Constitutional  o Appearance  o : Awake, alert, in no acute distress.   · Eyes  o Conjunctivae  o : Normal.  · Ears, Nose, Mouth and Throat  o Oral Cavity  o :   § Oral Mucosa  § : Normal.  · Neck  o Inspection/Palpation  o : No JVD. Good carotid upstroke. No thyromegaly.  · Respiratory  o Respiratory  o : Good " respiratory effort. Clear to percussion and auscultation.  · Cardiovascular  o Heart  o :   § Auscultation of Heart  § : S1, S2 normal. Regular rate and rhythm without murmurs, gallops, or rubs.  o Peripheral Vascular System  o :   § Extremities  § : Good femoral and pedal pulses. No pedal edema.  · Gastrointestinal  o Abdominal Examination  o : Soft. No tenderness or masses felt. No hepatosplenomegaly. Abdominal aorta is not palpable.  · EKG  o EKG  o : Was performed in the office today  o Indications  o : Due to shortness of breath.  o Results  o : Showed normal sinus rhythm with PVCs, left atrial enlargement, left anterior fascicular block.   o Comparison  o : No signifigant change since previous EKG.          Assessment     1.  Dyspnea on exertion, worsened, new onset. Will recommend a stress test for possible atypical anginal symptoms based on the EKG. Continue with chronic aspirin once a day.   2.  Dizziness with positional changes, possibly orthostasis. Blood pressure on the lower normal size. We will discontinue his atenolol and see how he does symptomatically. Also encouraged compression stockings.   3.  Hyperlipidemia. On both statins and Repatha, previously at goal. Repeat lipids and LFTs on next visit.          Haris Clements MD  /               Electronically Signed by: Yi Beasley-, OT -Author on May 15, 2021 10:21:58 AM  Electronically Co-signed by: Haris Clements MD -Reviewer on May 17, 2021 03:22:25 PM

## 2021-06-14 ENCOUNTER — TELEPHONE (OUTPATIENT)
Dept: CARDIOLOGY | Facility: CLINIC | Age: 61
End: 2021-06-14

## 2021-06-14 DIAGNOSIS — E78.5 HYPERLIPIDEMIA LDL GOAL <100: Primary | ICD-10-CM

## 2021-06-14 DIAGNOSIS — Z51.81 ENCOUNTER FOR MEDICATION MONITORING: ICD-10-CM

## 2021-06-14 NOTE — TELEPHONE ENCOUNTER
Patient thinks he supposed to have labs for next appointment but none ordered.  Last note from 5/13 says repeat lipids and LFT's on next visit.  Do you want to go ahead and order these labs for January appt?

## 2021-06-15 NOTE — TELEPHONE ENCOUNTER
Patient is aware.  He is concerned about ejection fraction from stress test since he was called and advised he needed echo and labs.  Advised we will know more when the echo is read.  Advised we will call with echo results when it is ready.  Patient state he is actually feeling better since his atenolol was stopped.  Advised to monitor BP and call if it becomes elevated since atenolol was stopped.

## 2021-06-16 ENCOUNTER — TELEPHONE (OUTPATIENT)
Dept: CARDIOLOGY | Facility: CLINIC | Age: 61
End: 2021-06-16

## 2021-06-16 NOTE — TELEPHONE ENCOUNTER
----- Message from ENRIQUE Sanderson sent at 6/15/2021  2:21 PM EDT -----  Notify patient echocardiogram results:  Left ventricular ejection fraction appears to be 56 - 60%. No valvular disease noted.

## 2021-06-17 NOTE — TELEPHONE ENCOUNTER
Informed patient ofthe results of his Echocardiogram per previous note. Patient voiced understanding.

## 2021-06-21 ENCOUNTER — LAB (OUTPATIENT)
Dept: FAMILY MEDICINE CLINIC | Facility: CLINIC | Age: 61
End: 2021-06-21

## 2021-06-21 DIAGNOSIS — Z51.81 MEDICATION MONITORING ENCOUNTER: Primary | ICD-10-CM

## 2021-06-21 DIAGNOSIS — I10 HYPERTENSION, UNSPECIFIED TYPE: ICD-10-CM

## 2021-06-21 DIAGNOSIS — Z12.5 SCREENING FOR PROSTATE CANCER: ICD-10-CM

## 2021-06-21 LAB
ALBUMIN SERPL-MCNC: 4.2 G/DL (ref 3.5–5.2)
ALBUMIN/GLOB SERPL: 1.6 G/DL
ALP SERPL-CCNC: 80 U/L (ref 39–117)
ALT SERPL W P-5'-P-CCNC: 35 U/L (ref 1–41)
ANION GAP SERPL CALCULATED.3IONS-SCNC: 6.1 MMOL/L (ref 5–15)
AST SERPL-CCNC: 26 U/L (ref 1–40)
BASOPHILS # BLD AUTO: 0.03 10*3/MM3 (ref 0–0.2)
BASOPHILS NFR BLD AUTO: 0.6 % (ref 0–1.5)
BILIRUB SERPL-MCNC: 0.5 MG/DL (ref 0–1.2)
BUN SERPL-MCNC: 13 MG/DL (ref 6–20)
BUN/CREAT SERPL: 14.1 (ref 7–25)
CALCIUM SPEC-SCNC: 9.3 MG/DL (ref 8.6–10.5)
CHLORIDE SERPL-SCNC: 104 MMOL/L (ref 98–107)
CHOLEST SERPL-MCNC: 134 MG/DL (ref 0–200)
CO2 SERPL-SCNC: 25.9 MMOL/L (ref 22–29)
CREAT SERPL-MCNC: 0.92 MG/DL (ref 0.76–1.27)
DEPRECATED RDW RBC AUTO: 37.2 FL (ref 37–54)
EOSINOPHIL # BLD AUTO: 0.11 10*3/MM3 (ref 0–0.4)
EOSINOPHIL NFR BLD AUTO: 2.3 % (ref 0.3–6.2)
ERYTHROCYTE [DISTWIDTH] IN BLOOD BY AUTOMATED COUNT: 11.7 % (ref 12.3–15.4)
GFR SERPL CREATININE-BSD FRML MDRD: 84 ML/MIN/1.73
GLOBULIN UR ELPH-MCNC: 2.7 GM/DL
GLUCOSE SERPL-MCNC: 117 MG/DL (ref 65–99)
HCT VFR BLD AUTO: 49.4 % (ref 37.5–51)
HDLC SERPL-MCNC: 77 MG/DL (ref 40–60)
HGB BLD-MCNC: 16.7 G/DL (ref 13–17.7)
IMM GRANULOCYTES # BLD AUTO: 0.02 10*3/MM3 (ref 0–0.05)
IMM GRANULOCYTES NFR BLD AUTO: 0.4 % (ref 0–0.5)
LDLC SERPL CALC-MCNC: 47 MG/DL (ref 0–100)
LDLC/HDLC SERPL: 0.64 {RATIO}
LYMPHOCYTES # BLD AUTO: 0.87 10*3/MM3 (ref 0.7–3.1)
LYMPHOCYTES NFR BLD AUTO: 18.4 % (ref 19.6–45.3)
MCH RBC QN AUTO: 29.6 PG (ref 26.6–33)
MCHC RBC AUTO-ENTMCNC: 33.8 G/DL (ref 31.5–35.7)
MCV RBC AUTO: 87.6 FL (ref 79–97)
MONOCYTES # BLD AUTO: 0.63 10*3/MM3 (ref 0.1–0.9)
MONOCYTES NFR BLD AUTO: 13.3 % (ref 5–12)
NEUTROPHILS NFR BLD AUTO: 3.08 10*3/MM3 (ref 1.7–7)
NEUTROPHILS NFR BLD AUTO: 65 % (ref 42.7–76)
NRBC BLD AUTO-RTO: 0 /100 WBC (ref 0–0.2)
PLATELET # BLD AUTO: 235 10*3/MM3 (ref 140–450)
PMV BLD AUTO: 10.4 FL (ref 6–12)
POTASSIUM SERPL-SCNC: 4.7 MMOL/L (ref 3.5–5.2)
PROT SERPL-MCNC: 6.9 G/DL (ref 6–8.5)
PSA SERPL-MCNC: 0.86 NG/ML (ref 0–4)
RBC # BLD AUTO: 5.64 10*6/MM3 (ref 4.14–5.8)
SODIUM SERPL-SCNC: 136 MMOL/L (ref 136–145)
TRIGL SERPL-MCNC: 38 MG/DL (ref 0–150)
VLDLC SERPL-MCNC: 10 MG/DL (ref 5–40)
WBC # BLD AUTO: 4.74 10*3/MM3 (ref 3.4–10.8)

## 2021-06-21 PROCEDURE — 80061 LIPID PANEL: CPT | Performed by: FAMILY MEDICINE

## 2021-06-21 PROCEDURE — 99211 OFF/OP EST MAY X REQ PHY/QHP: CPT | Performed by: FAMILY MEDICINE

## 2021-06-21 PROCEDURE — 85025 COMPLETE CBC W/AUTO DIFF WBC: CPT | Performed by: FAMILY MEDICINE

## 2021-06-21 PROCEDURE — G0103 PSA SCREENING: HCPCS | Performed by: FAMILY MEDICINE

## 2021-06-21 PROCEDURE — 80053 COMPREHEN METABOLIC PANEL: CPT | Performed by: FAMILY MEDICINE

## 2021-06-28 ENCOUNTER — OFFICE VISIT (OUTPATIENT)
Dept: FAMILY MEDICINE CLINIC | Facility: CLINIC | Age: 61
End: 2021-06-28

## 2021-06-28 VITALS
BODY MASS INDEX: 27.83 KG/M2 | TEMPERATURE: 97.9 F | SYSTOLIC BLOOD PRESSURE: 110 MMHG | HEIGHT: 70 IN | DIASTOLIC BLOOD PRESSURE: 68 MMHG | OXYGEN SATURATION: 97 % | HEART RATE: 86 BPM | WEIGHT: 194.4 LBS

## 2021-06-28 DIAGNOSIS — Z51.81 MEDICATION MONITORING ENCOUNTER: ICD-10-CM

## 2021-06-28 DIAGNOSIS — E78.5 HYPERLIPIDEMIA, UNSPECIFIED HYPERLIPIDEMIA TYPE: ICD-10-CM

## 2021-06-28 DIAGNOSIS — L82.1 SEBORRHEIC KERATOSIS: ICD-10-CM

## 2021-06-28 DIAGNOSIS — Z12.11 SCREENING FOR COLON CANCER: ICD-10-CM

## 2021-06-28 DIAGNOSIS — I10 ESSENTIAL HYPERTENSION: Primary | ICD-10-CM

## 2021-06-28 DIAGNOSIS — R73.03 PREDIABETES: ICD-10-CM

## 2021-06-28 DIAGNOSIS — E53.8 B12 DEFICIENCY: ICD-10-CM

## 2021-06-28 PROCEDURE — 99213 OFFICE O/P EST LOW 20 MIN: CPT | Performed by: FAMILY MEDICINE

## 2021-06-28 RX ORDER — SILDENAFIL 100 MG/1
100 TABLET, FILM COATED ORAL DAILY PRN
COMMUNITY

## 2021-06-28 RX ORDER — ROSUVASTATIN CALCIUM 20 MG/1
20 TABLET, COATED ORAL DAILY
COMMUNITY
Start: 2021-05-25 | End: 2021-11-05

## 2021-06-28 RX ORDER — EVOLOCUMAB 140 MG/ML
INJECTION, SOLUTION SUBCUTANEOUS
COMMUNITY
Start: 2021-04-27 | End: 2021-10-06

## 2021-06-28 RX ORDER — LISINOPRIL 20 MG/1
20 TABLET ORAL DAILY
COMMUNITY
Start: 2021-06-01 | End: 2021-11-29

## 2021-06-28 RX ORDER — ASPIRIN 81 MG/1
TABLET ORAL DAILY
COMMUNITY

## 2021-06-28 RX ORDER — EZETIMIBE 10 MG/1
TABLET ORAL
COMMUNITY
End: 2021-06-28

## 2021-06-28 NOTE — PROGRESS NOTES
"Chief Complaint  Hypertension  Follow up from visit with cardiology  Seborrheic keratosis    Subjective          Carlos Manuel Sanchez presents to [facility]  History of Present Illness  Patient presents today to discuss recent labs that were done.  His PSA test came back stable at 0.857.  His PSA level has been around 0.9.  Lipid panel shows LDL at goal at 47.  He is taking Repatha and Crestor.  He does see Dr. Clements.  We discussed continue current management.  He does have slightly elevated glucose levels at 117.  I discussed with him his previous lab work showing an A1c as high as 5.8% placing him in the prediabetic range.  Discussed having his labs repeated prior to next appointment.  He was also previously noted to have a low B12 level.  I discussed taking once weekly vitamin B12 at 500 mcg as they were just below the range.  He recently had a stress test which was normal but the ejection fraction came back at 36%.  This was followed up with an echocardiogram which he had completed on 6/14/2021.  This showed an ejection fraction of 56 to 60%.  It was otherwise normal.  He did have a seborrheic keratosis on his scalp last time I saw him.  This was treated with liquid nitrogen with resolution noted per patient.  Patient is due for colonoscopy so I discussed ordering this for him.  His last one was about 5 or 6 years ago which was \"normal\" per patient.  Blood pressure has been under good control since stopping atenolol.  He was having some issues with feeling lightheaded when standing.  Resolved since his cardiologist stopped atenolol.  He is still taking lisinopril 20 mg.    Objective   Vital Signs:   /68   Pulse 86   Temp 97.9 °F (36.6 °C)   Ht 177.8 cm (70\")   Wt 88.2 kg (194 lb 6.4 oz)   SpO2 97%   BMI 27.89 kg/m²     Physical Exam   General: AAO ×3, no acute distress, pleasant  HEENT: Normocephalic, atraumatic  Cardiovascular: Regular rate and rhythm without appreciable " murmur  Respiratory: Clear to auscultation bilaterally no RRW  Gastrointestinal: Soft nontender nondistended with bowel sounds present  Skin: Resolution of previously noted seborrheic keratosis.  extremities: No edema  Neurologic: CN II through XII grossly intact   Psychiatric: Normal mood and affect  Result Review :                 Assessment and Plan    Diagnoses and all orders for this visit:    1. Essential hypertension (Primary)    2. Hyperlipidemia, unspecified hyperlipidemia type    3. Medication monitoring encounter    4. B12 deficiency    5. Prediabetes    6. Screening for colon cancer  -     Ambulatory Referral For Screening Colonoscopy    7. Seborrheic keratosis    Labs have been ordered for the next visit.  I will see patient back in 3 months or sooner if needed.  Patient instructed to call with any questions or concerns.  He has had resolution of seborrheic keratosis.  We discussed continue current medical management at this time.      I spent 25 minutes caring for Carlos Manuel on this date of service. This time includes time spent by me in the following activities:reviewing tests, obtaining and/or reviewing a separately obtained history, performing a medically appropriate examination and/or evaluation , ordering medications, tests, or procedures, documenting information in the medical record and care coordination  Follow Up   Return in about 3 months (around 9/28/2021) for labs/hypertension/prediabetes.  Patient was given instructions and counseling regarding his condition or for health maintenance advice. Please see specific information pulled into the AVS if appropriate.

## 2021-07-15 VITALS
WEIGHT: 200 LBS | SYSTOLIC BLOOD PRESSURE: 118 MMHG | HEIGHT: 70 IN | HEART RATE: 53 BPM | DIASTOLIC BLOOD PRESSURE: 66 MMHG | BODY MASS INDEX: 28.63 KG/M2

## 2021-08-02 PROBLEM — I51.9 HEART DISEASE: Status: ACTIVE | Noted: 2021-08-02

## 2021-08-02 PROBLEM — E78.5 HLD (HYPERLIPIDEMIA): Status: ACTIVE | Noted: 2021-08-02

## 2021-08-02 PROBLEM — E78.5 HYPERLIPEMIA: Status: ACTIVE | Noted: 2021-08-02

## 2021-08-02 PROBLEM — I10 HTN (HYPERTENSION): Status: ACTIVE | Noted: 2021-08-02

## 2021-08-05 ENCOUNTER — CLINICAL SUPPORT (OUTPATIENT)
Dept: GASTROENTEROLOGY | Facility: CLINIC | Age: 61
End: 2021-08-05

## 2021-08-05 ENCOUNTER — PREP FOR SURGERY (OUTPATIENT)
Dept: OTHER | Facility: HOSPITAL | Age: 61
End: 2021-08-05

## 2021-08-05 DIAGNOSIS — Z12.11 COLON CANCER SCREENING: Primary | ICD-10-CM

## 2021-08-05 RX ORDER — SODIUM, POTASSIUM,MAG SULFATES 17.5-3.13G
1 SOLUTION, RECONSTITUTED, ORAL ORAL EVERY 12 HOURS
Qty: 254 ML | Refills: 0 | Status: SHIPPED | OUTPATIENT
Start: 2021-08-05 | End: 2021-12-06

## 2021-08-05 NOTE — PROGRESS NOTES
SPOKE WITH PT ON A DATE FOR COLONOSCOPY OF 10/18/2021. MADE SURE CHART WAS UP TO DATE MEDS and HISTORY WENT OVER SUPREP AND MAILED OUT INSTRUCTIONS. PUT IN ORDER FOR COLON AND SENT IN PREP TO PHARMACY.

## 2021-10-06 RX ORDER — EVOLOCUMAB 140 MG/ML
INJECTION, SOLUTION SUBCUTANEOUS
Qty: 6 PEN | Refills: 2 | Status: SHIPPED | OUTPATIENT
Start: 2021-10-06 | End: 2022-06-22

## 2021-10-06 NOTE — TELEPHONE ENCOUNTER
Patient last seen on 05.13.21     Medication was documented at that visit.       Next OV  01.12.22

## 2021-10-14 NOTE — PRE-PROCEDURE INSTRUCTIONS
Pt. Instructed on laxative and skin prep, pre-op meds, clear liquid diet. Ok to take Crestor a.m. of procedure.

## 2021-10-18 ENCOUNTER — ANESTHESIA EVENT (OUTPATIENT)
Dept: GASTROENTEROLOGY | Facility: HOSPITAL | Age: 61
End: 2021-10-18

## 2021-10-18 ENCOUNTER — ANESTHESIA (OUTPATIENT)
Dept: GASTROENTEROLOGY | Facility: HOSPITAL | Age: 61
End: 2021-10-18

## 2021-10-18 ENCOUNTER — HOSPITAL ENCOUNTER (OUTPATIENT)
Facility: HOSPITAL | Age: 61
Setting detail: HOSPITAL OUTPATIENT SURGERY
Discharge: HOME | End: 2021-10-18
Attending: INTERNAL MEDICINE | Admitting: INTERNAL MEDICINE

## 2021-10-18 VITALS
OXYGEN SATURATION: 97 % | DIASTOLIC BLOOD PRESSURE: 91 MMHG | HEART RATE: 63 BPM | WEIGHT: 190.48 LBS | HEIGHT: 70 IN | SYSTOLIC BLOOD PRESSURE: 132 MMHG | TEMPERATURE: 97.3 F | RESPIRATION RATE: 14 BRPM | BODY MASS INDEX: 27.27 KG/M2

## 2021-10-18 DIAGNOSIS — Z12.11 COLON CANCER SCREENING: ICD-10-CM

## 2021-10-18 PROCEDURE — 88305 TISSUE EXAM BY PATHOLOGIST: CPT | Performed by: INTERNAL MEDICINE

## 2021-10-18 PROCEDURE — 45385 COLONOSCOPY W/LESION REMOVAL: CPT | Performed by: INTERNAL MEDICINE

## 2021-10-18 PROCEDURE — C1889 IMPLANT/INSERT DEVICE, NOC: HCPCS | Performed by: INTERNAL MEDICINE

## 2021-10-18 PROCEDURE — 45380 COLONOSCOPY AND BIOPSY: CPT | Performed by: INTERNAL MEDICINE

## 2021-10-18 PROCEDURE — 45381 COLONOSCOPY SUBMUCOUS NJX: CPT | Performed by: INTERNAL MEDICINE

## 2021-10-18 PROCEDURE — 25010000002 PROPOFOL 10 MG/ML EMULSION: Performed by: NURSE ANESTHETIST, CERTIFIED REGISTERED

## 2021-10-18 DEVICE — DEV CLIP ENDO RESOLUTION360 CONTRL ROT 235CM: Type: IMPLANTABLE DEVICE | Site: COLON | Status: FUNCTIONAL

## 2021-10-18 RX ORDER — PROPOFOL 10 MG/ML
VIAL (ML) INTRAVENOUS AS NEEDED
Status: DISCONTINUED | OUTPATIENT
Start: 2021-10-18 | End: 2021-10-18 | Stop reason: SURG

## 2021-10-18 RX ORDER — SODIUM CHLORIDE, SODIUM LACTATE, POTASSIUM CHLORIDE, CALCIUM CHLORIDE 600; 310; 30; 20 MG/100ML; MG/100ML; MG/100ML; MG/100ML
INJECTION, SOLUTION INTRAVENOUS CONTINUOUS PRN
Status: DISCONTINUED | OUTPATIENT
Start: 2021-10-18 | End: 2021-10-18 | Stop reason: SURG

## 2021-10-18 RX ORDER — SODIUM CHLORIDE, SODIUM LACTATE, POTASSIUM CHLORIDE, CALCIUM CHLORIDE 600; 310; 30; 20 MG/100ML; MG/100ML; MG/100ML; MG/100ML
30 INJECTION, SOLUTION INTRAVENOUS CONTINUOUS
Status: DISCONTINUED | OUTPATIENT
Start: 2021-10-18 | End: 2021-10-18 | Stop reason: HOSPADM

## 2021-10-18 RX ORDER — LIDOCAINE HYDROCHLORIDE 20 MG/ML
INJECTION, SOLUTION INFILTRATION; PERINEURAL AS NEEDED
Status: DISCONTINUED | OUTPATIENT
Start: 2021-10-18 | End: 2021-10-18 | Stop reason: SURG

## 2021-10-18 RX ADMIN — PROPOFOL 100 MG: 10 INJECTION, EMULSION INTRAVENOUS at 11:00

## 2021-10-18 RX ADMIN — PROPOFOL 50 MG: 10 INJECTION, EMULSION INTRAVENOUS at 11:04

## 2021-10-18 RX ADMIN — SODIUM CHLORIDE, POTASSIUM CHLORIDE, SODIUM LACTATE AND CALCIUM CHLORIDE: 600; 310; 30; 20 INJECTION, SOLUTION INTRAVENOUS at 10:58

## 2021-10-18 RX ADMIN — PROPOFOL 50 MG: 10 INJECTION, EMULSION INTRAVENOUS at 11:08

## 2021-10-18 RX ADMIN — LIDOCAINE HYDROCHLORIDE 100 MG: 20 INJECTION, SOLUTION INFILTRATION; PERINEURAL at 11:00

## 2021-10-18 RX ADMIN — SODIUM CHLORIDE, POTASSIUM CHLORIDE, SODIUM LACTATE AND CALCIUM CHLORIDE 30 ML/HR: 600; 310; 30; 20 INJECTION, SOLUTION INTRAVENOUS at 09:27

## 2021-10-18 NOTE — DISCHARGE INSTRUCTIONS
Colonoscopy, Adult, Care After  This sheet gives you information about how to care for yourself after your procedure. Your doctor may also give you more specific instructions. If you have problems or questions, call your doctor.  What can I expect after the procedure?  After the procedure, it is common to have:  · A small amount of blood in your poop (stool) for 24 hours.  · Some gas.  · Mild cramping or bloating in your belly (abdomen).  Follow these instructions at home:  Eating and drinking    · Drink enough fluid to keep your pee (urine) pale yellow.  · Follow instructions from your doctor about what you cannot eat or drink.  · Return to your normal diet as told by your doctor. Avoid heavy or fried foods that are hard to digest.    Activity  · Rest as told by your doctor.  · Do not sit for a long time without moving. Get up to take short walks every 1-2 hours. This is important. Ask for help if you feel weak or unsteady.  · Return to your normal activities as told by your doctor. Ask your doctor what activities are safe for you.  To help cramping and bloating:    · Try walking around.  · Put heat on your belly as told by your doctor. Use the heat source that your doctor recommends, such as a moist heat pack or a heating pad.  ? Put a towel between your skin and the heat source.  ? Leave the heat on for 20-30 minutes.  ? Remove the heat if your skin turns bright red. This is very important if you are unable to feel pain, heat, or cold. You may have a greater risk of getting burned.    General instructions  · If you were given a medicine to help you relax (sedative) during your procedure, it can affect you for many hours. Do not drive or use machinery until your doctor says that it is safe.  · For the first 24 hours after the procedure:  ? Do not sign important documents.  ? Do not drink alcohol.  ? Do your daily activities more slowly than normal.  ? Eat foods that are soft and easy to digest.  · Take  over-the-counter or prescription medicines only as told by your doctor.  · Keep all follow-up visits as told by your doctor. This is important.  Contact a doctor if:  · You have blood in your poop 2-3 days after the procedure.  Get help right away if:  · You have more than a small amount of blood in your poop.  · You see large clumps of tissue (blood clots) in your poop.  · Your belly is swollen.  · You feel like you may vomit (nauseous).  · You vomit.  · You have a fever.  · You have belly pain that gets worse, and medicine does not help your pain.  Summary  · After the procedure, it is common to have a small amount of blood in your poop. You may also have mild cramping and bloating in your belly.  · If you were given a medicine to help you relax (sedative) during your procedure, it can affect you for many hours. Do not drive or use machinery until your doctor says that it is safe.  · Get help right away if you have a lot of blood in your poop, feel like you may vomit, have a fever, or have more belly pain.  This information is not intended to replace advice given to you by your health care provider. Make sure you discuss any questions you have with your health care provider.  Document Revised: 10/23/2020 Document Reviewed: 07/13/2020  [employer] Patient Education © 2021 [employer]    Colon Polyps    Colon polyps are tissue growths inside the colon, which is part of the large intestine. They are one of the types of polyps that can grow in the body. A polyp may be a round bump or a mushroom-shaped growth. You could have one polyp or more than one.  Most colon polyps are noncancerous (benign). However, some colon polyps can become cancerous over time. Finding and removing the polyps early can help prevent this.  What are the causes?  The exact cause of colon polyps is not known.  What increases the risk?  The following factors may make you more likely to develop this condition:  · Having a family history of  colorectal cancer or colon polyps.  · Being older than 45 years of age.  · Being younger than 45 years of age and having a significant family history of colorectal cancer or colon polyps or a genetic condition that puts you at higher risk of getting colon polyps.  · Having inflammatory bowel disease, such as ulcerative colitis or Crohn's disease.  · Having certain conditions passed from parent to child (hereditary conditions), such as:  ? Familial adenomatous polyposis (FAP).  ? Blake syndrome.  ? Turcot syndrome.  ? Peutz-Jeghers syndrome.  ? MUTYH-associated polyposis (MAP).  · Being overweight.  · Certain lifestyle factors. These include smoking cigarettes, drinking too much alcohol, not getting enough exercise, and eating a diet that is high in fat and red meat and low in fiber.  · Having had childhood cancer that was treated with radiation of the abdomen.  What are the signs or symptoms?  Many times, there are no symptoms.  If you have symptoms, they may include:  · Blood coming from the rectum during a bowel movement.  · Blood in the stool (feces). The blood may be bright red or very dark in color.  · Pain in the abdomen.  · A change in bowel habits, such as constipation or diarrhea.  How is this diagnosed?  This condition is diagnosed with a colonoscopy. This is a procedure in which a lighted, flexible scope is inserted into the opening between the buttocks (anus) and then passed into the colon to examine the area. Polyps are sometimes found when a colonoscopy is done as part of routine cancer screening tests.  How is this treated?  This condition is treated by removing any polyps that are found. Most polyps can be removed during a colonoscopy. Those polyps will then be tested for cancer. Additional treatment may be needed depending on the results of testing.  Follow these instructions at home:  Eating and drinking    · Eat foods that are high in fiber, such as fruits, vegetables, and whole grains.  · Eat  foods that are high in calcium and vitamin D, such as milk, cheese, yogurt, eggs, liver, fish, and broccoli.  · Limit foods that are high in fat, such as fried foods and desserts.  · Limit the amount of red meat, precooked or cured meat, or other processed meat that you eat, such as hot dogs, sausages, prince, or meat loaves.  · Limit sugary drinks.    Lifestyle  · Maintain a healthy weight, or lose weight if recommended by your health care provider.  · Exercise every day or as told by your health care provider.  · Do not use any products that contain nicotine or tobacco, such as cigarettes, e-cigarettes, and chewing tobacco. If you need help quitting, ask your health care provider.  · Do not drink alcohol if:  ? Your health care provider tells you not to drink.  ? You are pregnant, may be pregnant, or are planning to become pregnant.  · If you drink alcohol:  ? Limit how much you use to:  § 0-1 drink a day for women.  § 0-2 drinks a day for men.  ? Know how much alcohol is in your drink. In the [place] one drink equals one 12 oz bottle of beer (355 mL), one 5 oz glass of wine (148 mL), or one 1½ oz glass of hard liquor (44 mL).  General instructions  · Take over-the-counter and prescription medicines only as told by your health care provider.  · Keep all follow-up visits. This is important. This includes having regularly scheduled colonoscopies. Talk to your health care provider about when you need a colonoscopy.  Contact a health care provider if:  · You have new or worsening bleeding during a bowel movement.  · You have new or increased blood in your stool.  · You have a change in bowel habits.  · You lose weight for no known reason.  Summary  · Colon polyps are tissue growths inside the colon, which is part of the large intestine. They are one type of polyp that can grow in the body.  · Most colon polyps are noncancerous (benign), but some can become cancerous over time.  · This condition is diagnosed with a  colonoscopy.  · This condition is treated by removing any polyps that are found. Most polyps can be removed during a colonoscopy.  This information is not intended to replace advice given to you by your health care provider. Make sure you discuss any questions you have with your health care provider.  Document Revised: 04/07/2021 Document Reviewed: 04/07/2021  [employer] Patient Education © 2021 [employer]    Diverticulosis    Diverticulosis is a condition that develops when small pouches (diverticula) form in the wall of the large intestine (colon). The colon is where water is absorbed and stool (feces) is formed. The pouches form when the inside layer of the colon pushes through weak spots in the outer layers of the colon. You may have a few pouches or many of them.  The pouches usually do not cause problems unless they become inflamed or infected. When this happens, the condition is called diverticulitis.  What are the causes?  The cause of this condition is not known.  What increases the risk?  The following factors may make you more likely to develop this condition:  · Being older than age 60. Your risk for this condition increases with age. Diverticulosis is rare among people younger than age 30. By age 80, many people have it.  · Eating a low-fiber diet.  · Having frequent constipation.  · Being overweight.  · Not getting enough exercise.  · Smoking.  · Taking over-the-counter pain medicines, like aspirin and ibuprofen.  · Having a family history of diverticulosis.  What are the signs or symptoms?  In most people, there are no symptoms of this condition. If you do have symptoms, they may include:  · Bloating.  · Cramps in the abdomen.  · Constipation or diarrhea.  · Pain in the lower left side of the abdomen.  How is this diagnosed?  Because diverticulosis usually has no symptoms, it is most often diagnosed during an exam for other colon problems. The condition may be diagnosed by:  · Using a flexible scope  to examine the colon (colonoscopy).  · Taking an X-ray of the colon after dye has been put into the colon (barium enema).  · Having a CT scan.  How is this treated?  You may not need treatment for this condition. Your health care provider may recommend treatment to prevent problems. You may need treatment if you have symptoms or if you previously had diverticulitis. Treatment may include:  · Eating a high-fiber diet.  · Taking a fiber supplement.  · Taking a live bacteria supplement (probiotic).  · Taking medicine to relax your colon.  Follow these instructions at home:  Medicines  · Take over-the-counter and prescription medicines only as told by your health care provider.  · If told by your health care provider, take a fiber supplement or probiotic.  Constipation prevention  Your condition may cause constipation. To prevent or treat constipation, you may need to:  · Drink enough fluid to keep your urine pale yellow.  · Take over-the-counter or prescription medicines.  · Eat foods that are high in fiber, such as beans, whole grains, and fresh fruits and vegetables.  · Limit foods that are high in fat and processed sugars, such as fried or sweet foods.    General instructions  · Try not to strain when you have a bowel movement.  · Keep all follow-up visits as told by your health care provider. This is important.  Contact a health care provider if you:  · Have pain in your abdomen.  · Have bloating.  · Have cramps.  · Have not had a bowel movement in 3 days.  Get help right away if:  · Your pain gets worse.  · Your bloating becomes very bad.  · You have a fever or chills, and your symptoms suddenly get worse.  · You vomit.  · You have bowel movements that are bloody or black.  · You have bleeding from your rectum.  Summary  · Diverticulosis is a condition that develops when small pouches (diverticula) form in the wall of the large intestine (colon).  · You may have a few pouches or many of them.  · This condition is  most often diagnosed during an exam for other colon problems.  · Treatment may include increasing the fiber in your diet, taking supplements, or taking medicines.  This information is not intended to replace advice given to you by your health care provider. Make sure you discuss any questions you have with your health care provider.  Document Revised: 07/16/2020 Document Reviewed: 07/16/2020  [employer] Patient Education © 2021 [employer]    High-Fiber Eating Plan  Fiber, also called dietary fiber, is a type of carbohydrate. It is found foods such as fruits, vegetables, whole grains, and beans. A high-fiber diet can have many health benefits. Your health care provider may recommend a high-fiber diet to help:  · Prevent constipation. Fiber can make your bowel movements more regular.  · Lower your cholesterol.  · Relieve the following conditions:  ? Inflammation of veins in the anus (hemorrhoids).  ? Inflammation of specific areas of the digestive tract (uncomplicated diverticulosis).  ? A problem of the large intestine, also called the colon, that sometimes causes pain and diarrhea (irritable bowel syndrome, or IBS).  · Prevent overeating as part of a weight-loss plan.  · Prevent heart disease, type 2 diabetes, and certain cancers.  What are tips for following this plan?  Reading food labels    · Check the nutrition facts label on food products for the amount of dietary fiber. Choose foods that have 5 grams of fiber or more per serving.  · The goals for recommended daily fiber intake include:  ? Men (age 50 or younger): 34-38 g.  ? Men (over age 50): 28-34 g.  ? Women (age 50 or younger): 25-28 g.  ? Women (over age 50): 22-25 g.  Your daily fiber goal is _____________ g.  Shopping  · Choose whole fruits and vegetables instead of processed forms, such as apple juice or applesauce.  · Choose a wide variety of high-fiber foods such as avocados, lentils, oats, and kidney beans.  · Read the nutrition facts label of the  foods you choose. Be aware of foods with added fiber. These foods often have high sugar and sodium amounts per serving.  Cooking  · Use whole-grain flour for baking and cooking.  · Cook with brown rice instead of white rice.  Meal planning  · Start the day with a breakfast that is high in fiber, such as a cereal that contains 5 g of fiber or more per serving.  · Eat breads and cereals that are made with whole-grain flour instead of refined flour or white flour.  · Eat brown rice, bulgur wheat, or millet instead of white rice.  · Use beans in place of meat in soups, salads, and pasta dishes.  · Be sure that half of the grains you eat each day are whole grains.  General information  · You can get the recommended daily intake of dietary fiber by:  ? Eating a variety of fruits, vegetables, grains, nuts, and beans.  ? Taking a fiber supplement if you are not able to take in enough fiber in your diet. It is better to get fiber through food than from a supplement.  · Gradually increase how much fiber you consume. If you increase your intake of dietary fiber too quickly, you may have bloating, cramping, or gas.  · Drink plenty of water to help you digest fiber.  · Choose high-fiber snacks, such as berries, raw vegetables, nuts, and popcorn.  What foods should I eat?  Fruits  Berries. Pears. Apples. Oranges. Avocado. Prunes and raisins. Dried figs.  Vegetables  Sweet potatoes. Spinach. Kale. Artichokes. Cabbage. Broccoli. Cauliflower. Green peas. Carrots. Squash.  Grains  Whole-grain breads. Multigrain cereal. Oats and oatmeal. Brown rice. Barley. Bulgur wheat. Millet. Quinoa. Bran muffins. Popcorn. Rye wafer crackers.  Meats and other proteins  Navy beans, kidney beans, and tomas beans. Soybeans. Split peas. Lentils. Nuts and seeds.  Dairy  Fiber-fortified yogurt.  Beverages  Fiber-fortified soy milk. Fiber-fortified orange juice.  Other foods  Fiber bars.  The items listed above may not be a complete list of recommended  foods and beverages. Contact a dietitian for more information.  What foods should I avoid?  Fruits  Fruit juice. Cooked, strained fruit.  Vegetables  Fried potatoes. Canned vegetables. Well-cooked vegetables.  Grains  White bread. Pasta made with refined flour. White rice.  Meats and other proteins  Fatty cuts of meat. Fried chicken or fried fish.  Dairy  Milk. Yogurt. Cream cheese. Sour cream.  Fats and oils  [place].  Beverages  Soft drinks.  Other foods  Cakes and pastries.  The items listed above may not be a complete list of foods and beverages to avoid. Talk with your dietitian about what choices are best for you.  Summary  · Fiber is a type of carbohydrate. It is found in foods such as fruits, vegetables, whole grains, and beans.  · A high-fiber diet has many benefits. It can help to prevent constipation, lower blood cholesterol, aid weight loss, and reduce your risk of heart disease, diabetes, and certain cancers.  · Increase your intake of fiber gradually. Increasing fiber too quickly may cause cramping, bloating, and gas. Drink plenty of water while you increase the amount of fiber you consume.  · The best sources of fiber include whole fruits and vegetables, whole grains, nuts, seeds, and beans.  This information is not intended to replace advice given to you by your health care provider. Make sure you discuss any questions you have with your health care provider.  Document Revised: 04/22/2021 Document Reviewed: 04/22/2021  [employer] Patient Education © 2021 [employer]

## 2021-10-18 NOTE — H&P
ScreeningPre Procedure History & Physical    Chief Complaint:   Screening     Subjective     HPI:   Screening     Past Medical History:   Past Medical History:   Diagnosis Date   • Heart disease     stent placed 2000   • HLD (hyperlipidemia)    • HTN (hypertension)    • Hyperlipemia        Past Surgical History:  Past Surgical History:   Procedure Laterality Date   • APPENDECTOMY     • COLONOSCOPY     • CORONARY STENT PLACEMENT     • ROTATOR CUFF REPAIR         Family History:  Family History   Problem Relation Age of Onset   • Heart disease Mother    • Heart disease Father    • Cancer Father    • Heart disease Sister    • Heart disease Brother    • Lung cancer Other    • Malig Hyperthermia Neg Hx        Social History:   reports that he has never smoked. He has never used smokeless tobacco. He reports current alcohol use. He reports that he does not use drugs.    Medications:   Medications Prior to Admission   Medication Sig Dispense Refill Last Dose   • aspirin (aspirin) 81 MG EC tablet Aspir-81 81 mg oral tablet,delayed release (DR/EC) take 1 tablet (81 mg) by oral route once daily   Active   Past Week at Unknown time   • lisinopril (PRINIVIL,ZESTRIL) 20 MG tablet Take 20 mg by mouth Daily.   Past Week at Unknown time   • Repatha SureClick solution auto-injector SureClick injection INJECT 1 ML (140 MG) UNDER THE SKIN EVERY 2 WEEKS IN THE ABDOMEN, THIGH OR OUTER AREA OF UPPER ARM (ROTATE SITES) 6 pen 2    • rosuvastatin (CRESTOR) 20 MG tablet Take 20 mg by mouth Daily.      • sildenafil (VIAGRA) 100 MG tablet Take 100 mg by mouth Daily As Needed.      • sodium-potassium-magnesium sulfates (Suprep Bowel Prep Kit) 17.5-3.13-1.6 GM/177ML solution oral solution Take 1 bottle by mouth Every 12 (Twelve) Hours. 254 mL 0        Allergies:  Patient has no known allergies.        Objective     Blood pressure 139/81, pulse 73, temperature 98.5 °F (36.9 °C), temperature source Temporal, resp. rate 18, height 177.9 cm  "(70.04\"), weight 86.4 kg (190 lb 7.6 oz), SpO2 98 %.    Physical Exam   Constitutional: Pt is oriented to person, place, and time and well-developed, well-nourished, and in no distress.   Mouth/Throat: Oropharynx is clear and moist.   Neck: Normal range of motion.   Cardiovascular: Normal rate, regular rhythm and normal heart sounds.    Pulmonary/Chest: Effort normal and breath sounds normal.   Abdominal: Soft. Nontender  Skin: Skin is warm and dry.   Psychiatric: Mood, memory, affect and judgment normal.     Assessment/Plan     Diagnosis:  Screening colonoscopy       Anticipated Surgical Procedure:  colonoscopy    The risks, benefits, and alternatives of this procedure have been discussed with the patient or the responsible party- the patient understands and agrees to proceed.            "

## 2021-10-18 NOTE — ANESTHESIA POSTPROCEDURE EVALUATION
Patient: Carlos Manuel Sanchez    Procedure Summary     Date: 10/18/21 Room / Location: [facility] ENDOSCOPY 2 / [facility] ENDOSCOPY    Anesthesia Start: 1056 Anesthesia Stop: 1118    Procedure: COLONOSCOPY with hot snare polypectomies, clips applied x2 (N/A ) Diagnosis:       Colon cancer screening      (Colon cancer screening [Z12.11])    Surgeons: Haroldo Perez MD Provider: Joey Martell MD    Anesthesia Type: general ASA Status: 3          Anesthesia Type: general    Vitals  Vitals Value Taken Time   /91 10/18/21 1137   Temp 36.3 °C (97.3 °F) 10/18/21 1137   Pulse 63 10/18/21 1137   Resp 14 10/18/21 1137   SpO2 97 % 10/18/21 1137           Anesthesia Post Evaluation

## 2021-10-18 NOTE — ANESTHESIA PREPROCEDURE EVALUATION
Anesthesia Evaluation     Patient summary reviewed and Nursing notes reviewed   no history of anesthetic complications:  NPO Solid Status: > 8 hours  NPO Liquid Status: > 2 hours           Airway   Mallampati: I  TM distance: >3 FB  Neck ROM: full  No difficulty expected  Dental      Pulmonary - negative pulmonary ROS and normal exam    breath sounds clear to auscultation  Cardiovascular - normal exam  Exercise tolerance: good (4-7 METS)    Rhythm: regular    (+) hypertension, CAD, cardiac stents     ROS comment: Negative stress test, normal EF    Neuro/Psych- negative ROS  GI/Hepatic/Renal/Endo - negative ROS     Musculoskeletal     Abdominal    Substance History      OB/GYN          Other                        Anesthesia Plan    ASA 3     general   total IV anesthesia    Anesthetic plan, all risks, benefits, and alternatives have been provided, discussed and informed consent has been obtained with: patient.

## 2021-10-19 LAB
CYTO UR: NORMAL
LAB AP CASE REPORT: NORMAL
LAB AP CLINICAL INFORMATION: NORMAL
PATH REPORT.FINAL DX SPEC: NORMAL
PATH REPORT.GROSS SPEC: NORMAL

## 2021-11-05 RX ORDER — ROSUVASTATIN CALCIUM 20 MG/1
TABLET, COATED ORAL
Qty: 90 TABLET | Refills: 3 | Status: SHIPPED | OUTPATIENT
Start: 2021-11-05 | End: 2022-10-19

## 2021-11-29 RX ORDER — LISINOPRIL 20 MG/1
TABLET ORAL
Qty: 90 TABLET | Refills: 1 | Status: SHIPPED | OUTPATIENT
Start: 2021-11-29 | End: 2022-05-27

## 2021-12-01 ENCOUNTER — TELEPHONE (OUTPATIENT)
Dept: CARDIOLOGY | Facility: CLINIC | Age: 61
End: 2021-12-01

## 2021-12-01 NOTE — TELEPHONE ENCOUNTER
Received VM from patient regarding if he needed labs prior to appointment.     SW patient.Questions answered.

## 2021-12-06 ENCOUNTER — OFFICE VISIT (OUTPATIENT)
Dept: FAMILY MEDICINE CLINIC | Facility: CLINIC | Age: 61
End: 2021-12-06

## 2021-12-06 VITALS
BODY MASS INDEX: 28.32 KG/M2 | OXYGEN SATURATION: 98 % | DIASTOLIC BLOOD PRESSURE: 72 MMHG | WEIGHT: 197.8 LBS | TEMPERATURE: 97.8 F | HEART RATE: 73 BPM | SYSTOLIC BLOOD PRESSURE: 118 MMHG | HEIGHT: 70 IN

## 2021-12-06 DIAGNOSIS — E78.00 PURE HYPERCHOLESTEROLEMIA: ICD-10-CM

## 2021-12-06 DIAGNOSIS — D17.21 LIPOMA OF RIGHT UPPER EXTREMITY: ICD-10-CM

## 2021-12-06 DIAGNOSIS — L50.2 URTICARIA DUE TO COLD: ICD-10-CM

## 2021-12-06 DIAGNOSIS — I10 PRIMARY HYPERTENSION: Primary | ICD-10-CM

## 2021-12-06 PROCEDURE — 99214 OFFICE O/P EST MOD 30 MIN: CPT | Performed by: FAMILY MEDICINE

## 2021-12-06 RX ORDER — LORATADINE 10 MG/1
10 TABLET ORAL DAILY
Qty: 90 TABLET | Refills: 1 | Status: SHIPPED | OUTPATIENT
Start: 2021-12-06 | End: 2022-06-15 | Stop reason: SDUPTHER

## 2021-12-06 RX ORDER — ATENOLOL 25 MG/1
TABLET ORAL
COMMUNITY
Start: 2021-08-30 | End: 2021-12-06

## 2021-12-06 NOTE — PROGRESS NOTES
"Chief Complaint  Mass right arm  Rash when cold  Hypertension      Subjective          Carlos Manuel Sanchez presents to [facility]  History of Present Illness  The patient presents today for follow-up for hypertension.  Blood pressure has been under good control.  He had stopped taking atenolol due to lightheadedness.  He is well controlled on lisinopril 20 mg.  He is taking Repatha and rosuvastatin for treatment of hypercholesterolemia.  He will be due for labs when he sees cardiology in January.  He has labs from both myself and cardiology, Dr. Clements.  He would like to get these done at the same time so encouraged him to return in January to get these done.  Patient did a colonoscopy recently on 10/18/2021.  This was done with Dr. Perez.  He had 2 polyps.  He was recommended to have follow-up in 3 years.  These were tubular adenomas measuring 10 mm and 4 mm.  Patient reports a rash when he gets cold.  He states that it will happen in most situations when his skin will get cold on the location of his body where the skin is exposed to cold air.  It can also happen with rain and when.  Patient reports that he recently came back from [place] and did not have any issues over there but when he gets exposed to cold weather he does break out in a rash with bumps that itch and burn.  He does not have a rash today.  He also reports a mass on the right arm region that has been present for about 7 years.  It has gotten bigger and now has become painful.  He was previously told it was a lipoma but since he was asymptomatic at that time he did not have it removed.  He has now become symptomatic and is interested in treatment.  Objective   Vital Signs:   /72   Pulse 73   Temp 97.8 °F (36.6 °C)   Ht 177.8 cm (70\")   Wt 89.7 kg (197 lb 12.8 oz)   SpO2 98%   BMI 28.38 kg/m²     Physical Exam   General: AAO ×3, no acute distress, pleasant  HEENT: Normocephalic, atraumatic  Skin: No appreciable " rash.  Patient does have a subcutaneous lesion on his right arm just distal to the deltoid.  It measures roughly 5 cm x 5 cm.  It is mobile and tender to the touch.  There is no surrounding erythema or evidence to suggest cellulitis.  Cardiovascular: Regular rate and rhythm without appreciable murmur  Respiratory: Clear to auscultation bilaterally no RRW  Gastrointestinal: Soft nontender nondistended with bowel sounds present  extremities: No edema  Neurologic: CN II through XII grossly intact   Psychiatric: Normal mood and affect  Result Review :                 Assessment and Plan    Diagnoses and all orders for this visit:    1. Primary hypertension (Primary)    2. Pure hypercholesterolemia    3. Lipoma of right upper extremity  -     Ambulatory Referral to General Surgery    4. Urticaria due to cold    Other orders  -     loratadine (Claritin) 10 MG tablet; Take 1 tablet by mouth Daily.  Dispense: 90 tablet; Refill: 1    Patient is experiencing cold-induced urticaria.  We discussed treatment options.  We discussed conservative measures.  I did discuss with him placing him on Claritin.  He can take up to 20 mg of Claritin twice a day if symptoms are worse.  If he continues to have issues he is instructed to call or return.  We did discuss starting on a standard dose of Claritin 10 mg daily as needed.  We discussed continue current management of hypertension hypercholesterolemia.  He does have a lipoma of the right upper extremity.  Discussed referral to general surgery to consider having excision given symptoms.  We will see patient back in 6 months or sooner if needed.    Follow Up   Return in about 6 months (around 6/6/2022) for hypertension.  Patient was given instructions and counseling regarding his condition or for health maintenance advice. Please see specific information pulled into the AVS if appropriate.

## 2022-01-03 ENCOUNTER — PREP FOR SURGERY (OUTPATIENT)
Dept: OTHER | Facility: HOSPITAL | Age: 62
End: 2022-01-03

## 2022-01-03 ENCOUNTER — LAB (OUTPATIENT)
Dept: FAMILY MEDICINE CLINIC | Facility: CLINIC | Age: 62
End: 2022-01-03

## 2022-01-03 ENCOUNTER — OFFICE VISIT (OUTPATIENT)
Dept: SURGERY | Facility: CLINIC | Age: 62
End: 2022-01-03

## 2022-01-03 VITALS — HEIGHT: 70 IN | WEIGHT: 204.6 LBS | BODY MASS INDEX: 29.29 KG/M2 | RESPIRATION RATE: 16 BRPM

## 2022-01-03 DIAGNOSIS — R22.31 MASS OF ARM, RIGHT: Primary | ICD-10-CM

## 2022-01-03 DIAGNOSIS — E53.8 B12 DEFICIENCY: ICD-10-CM

## 2022-01-03 DIAGNOSIS — I10 ESSENTIAL HYPERTENSION: ICD-10-CM

## 2022-01-03 DIAGNOSIS — Z51.81 MEDICATION MONITORING ENCOUNTER: ICD-10-CM

## 2022-01-03 DIAGNOSIS — Z23 NEED FOR INFLUENZA VACCINATION: Primary | ICD-10-CM

## 2022-01-03 DIAGNOSIS — E78.5 HYPERLIPIDEMIA LDL GOAL <100: ICD-10-CM

## 2022-01-03 DIAGNOSIS — R73.03 PREDIABETES: ICD-10-CM

## 2022-01-03 LAB
ALBUMIN SERPL-MCNC: 4.2 G/DL (ref 3.5–5.2)
ALBUMIN SERPL-MCNC: 4.2 G/DL (ref 3.5–5.2)
ALBUMIN/GLOB SERPL: 1.4 G/DL
ALBUMIN/GLOB SERPL: 1.5 G/DL
ALP SERPL-CCNC: 91 U/L (ref 39–117)
ALP SERPL-CCNC: 93 U/L (ref 39–117)
ALT SERPL W P-5'-P-CCNC: 35 U/L (ref 1–41)
ALT SERPL W P-5'-P-CCNC: 35 U/L (ref 1–41)
ANION GAP SERPL CALCULATED.3IONS-SCNC: 6.5 MMOL/L (ref 5–15)
ANION GAP SERPL CALCULATED.3IONS-SCNC: 7.1 MMOL/L (ref 5–15)
AST SERPL-CCNC: 23 U/L (ref 1–40)
AST SERPL-CCNC: 24 U/L (ref 1–40)
BASOPHILS # BLD AUTO: 0.03 10*3/MM3 (ref 0–0.2)
BASOPHILS NFR BLD AUTO: 0.6 % (ref 0–1.5)
BILIRUB SERPL-MCNC: 0.4 MG/DL (ref 0–1.2)
BILIRUB SERPL-MCNC: 0.4 MG/DL (ref 0–1.2)
BUN SERPL-MCNC: 14 MG/DL (ref 8–23)
BUN SERPL-MCNC: 14 MG/DL (ref 8–23)
BUN/CREAT SERPL: 15.7 (ref 7–25)
BUN/CREAT SERPL: 15.7 (ref 7–25)
CALCIUM SPEC-SCNC: 9.5 MG/DL (ref 8.6–10.5)
CALCIUM SPEC-SCNC: 9.6 MG/DL (ref 8.6–10.5)
CHLORIDE SERPL-SCNC: 100 MMOL/L (ref 98–107)
CHLORIDE SERPL-SCNC: 100 MMOL/L (ref 98–107)
CHOLEST SERPL-MCNC: 182 MG/DL (ref 0–200)
CO2 SERPL-SCNC: 27.9 MMOL/L (ref 22–29)
CO2 SERPL-SCNC: 28.5 MMOL/L (ref 22–29)
CREAT SERPL-MCNC: 0.89 MG/DL (ref 0.76–1.27)
CREAT SERPL-MCNC: 0.89 MG/DL (ref 0.76–1.27)
DEPRECATED RDW RBC AUTO: 36.6 FL (ref 37–54)
EOSINOPHIL # BLD AUTO: 0.06 10*3/MM3 (ref 0–0.4)
EOSINOPHIL NFR BLD AUTO: 1.2 % (ref 0.3–6.2)
ERYTHROCYTE [DISTWIDTH] IN BLOOD BY AUTOMATED COUNT: 11.6 % (ref 12.3–15.4)
GFR SERPL CREATININE-BSD FRML MDRD: 87 ML/MIN/1.73
GFR SERPL CREATININE-BSD FRML MDRD: 87 ML/MIN/1.73
GLOBULIN UR ELPH-MCNC: 2.8 GM/DL
GLOBULIN UR ELPH-MCNC: 2.9 GM/DL
GLUCOSE SERPL-MCNC: 119 MG/DL (ref 65–99)
GLUCOSE SERPL-MCNC: 121 MG/DL (ref 65–99)
HBA1C MFR BLD: 6.23 % (ref 4.8–5.6)
HCT VFR BLD AUTO: 49 % (ref 37.5–51)
HDLC SERPL-MCNC: 91 MG/DL (ref 40–60)
HGB BLD-MCNC: 16.9 G/DL (ref 13–17.7)
IMM GRANULOCYTES # BLD AUTO: 0.03 10*3/MM3 (ref 0–0.05)
IMM GRANULOCYTES NFR BLD AUTO: 0.6 % (ref 0–0.5)
LDLC SERPL CALC-MCNC: 80 MG/DL (ref 0–100)
LDLC/HDLC SERPL: 0.87 {RATIO}
LYMPHOCYTES # BLD AUTO: 0.8 10*3/MM3 (ref 0.7–3.1)
LYMPHOCYTES NFR BLD AUTO: 16.2 % (ref 19.6–45.3)
MCH RBC QN AUTO: 29.9 PG (ref 26.6–33)
MCHC RBC AUTO-ENTMCNC: 34.5 G/DL (ref 31.5–35.7)
MCV RBC AUTO: 86.7 FL (ref 79–97)
MONOCYTES # BLD AUTO: 0.58 10*3/MM3 (ref 0.1–0.9)
MONOCYTES NFR BLD AUTO: 11.7 % (ref 5–12)
NEUTROPHILS NFR BLD AUTO: 3.44 10*3/MM3 (ref 1.7–7)
NEUTROPHILS NFR BLD AUTO: 69.7 % (ref 42.7–76)
NRBC BLD AUTO-RTO: 0.2 /100 WBC (ref 0–0.2)
PLATELET # BLD AUTO: 208 10*3/MM3 (ref 140–450)
PMV BLD AUTO: 10.3 FL (ref 6–12)
POTASSIUM SERPL-SCNC: 4.6 MMOL/L (ref 3.5–5.2)
POTASSIUM SERPL-SCNC: 4.7 MMOL/L (ref 3.5–5.2)
PROT SERPL-MCNC: 7 G/DL (ref 6–8.5)
PROT SERPL-MCNC: 7.1 G/DL (ref 6–8.5)
RBC # BLD AUTO: 5.65 10*6/MM3 (ref 4.14–5.8)
SODIUM SERPL-SCNC: 135 MMOL/L (ref 136–145)
SODIUM SERPL-SCNC: 135 MMOL/L (ref 136–145)
TRIGL SERPL-MCNC: 58 MG/DL (ref 0–150)
VIT B12 BLD-MCNC: 315 PG/ML (ref 211–946)
VLDLC SERPL-MCNC: 11 MG/DL (ref 5–40)
WBC NRBC COR # BLD: 4.94 10*3/MM3 (ref 3.4–10.8)

## 2022-01-03 PROCEDURE — 80053 COMPREHEN METABOLIC PANEL: CPT | Performed by: INTERNAL MEDICINE

## 2022-01-03 PROCEDURE — 80053 COMPREHEN METABOLIC PANEL: CPT | Performed by: FAMILY MEDICINE

## 2022-01-03 PROCEDURE — 36415 COLL VENOUS BLD VENIPUNCTURE: CPT | Performed by: FAMILY MEDICINE

## 2022-01-03 PROCEDURE — 99204 OFFICE O/P NEW MOD 45 MIN: CPT | Performed by: SURGERY

## 2022-01-03 PROCEDURE — 80061 LIPID PANEL: CPT | Performed by: INTERNAL MEDICINE

## 2022-01-03 PROCEDURE — 85025 COMPLETE CBC W/AUTO DIFF WBC: CPT | Performed by: FAMILY MEDICINE

## 2022-01-03 PROCEDURE — 82607 VITAMIN B-12: CPT | Performed by: FAMILY MEDICINE

## 2022-01-03 PROCEDURE — 83036 HEMOGLOBIN GLYCOSYLATED A1C: CPT | Performed by: FAMILY MEDICINE

## 2022-01-03 NOTE — PROGRESS NOTES
Chief Complaint:  lipoma (rt upper extremity)        Primary Care Provider: Kurt Arango DO    Referring Provider: Kurt Arango DO    History of Present Illness  Carlos Manuel Sanchez is a 61 y.o. male referred by Kurt Arango DO a right arm mass.  The mass has been present for about 1 year.  It is slowly increased in size.  Over the past few months the patient started having pain when the mass is located.  The pain occurs anytime the mass is palpated or external pressure was placed over it.    Allergies: Patient has no known allergies.    Outpatient Medications Marked as Taking for the 1/3/22 encounter (Office Visit) with Jeff Tomas MD   Medication Sig Dispense Refill   • aspirin (aspirin) 81 MG EC tablet Aspir-81 81 mg oral tablet,delayed release (DR/EC) take 1 tablet (81 mg) by oral route once daily   Active     • lisinopril (PRINIVIL,ZESTRIL) 20 MG tablet TAKE 1 TABLET DAILY 90 tablet 1   • loratadine (Claritin) 10 MG tablet Take 1 tablet by mouth Daily. 90 tablet 1   • Repatha SureClick solution auto-injector SureClick injection INJECT 1 ML (140 MG) UNDER THE SKIN EVERY 2 WEEKS IN THE ABDOMEN, THIGH OR OUTER AREA OF UPPER ARM (ROTATE SITES) 6 pen 2   • rosuvastatin (CRESTOR) 20 MG tablet TAKE 1 TABLET DAILY 90 tablet 3   • sildenafil (VIAGRA) 100 MG tablet Take 100 mg by mouth Daily As Needed.         Past Medical History:   • Heart disease    stent placed 2000   • HLD (hyperlipidemia)   • HTN (hypertension)   • Hyperlipemia        Past Surgical History:   • APPENDECTOMY   • COLONOSCOPY   • COLONOSCOPY    Procedure: COLONOSCOPY with hot snare polypectomies, clips applied x2;  Surgeon: Haroldo Perez MD;  Location: [facility];  Service: Gastroenterology;  Laterality: N/A;  cecum polyp, transverse polyp   • CORONARY STENT PLACEMENT   • ROTATOR CUFF REPAIR       Family History:   Family History   Problem Relation Age of Onset   • Heart disease Mother    • Heart disease Father    •  "Cancer Father    • Heart disease Sister    • Heart disease Brother    • Lung cancer Other    • Malig Hyperthermia Neg Hx         Social History:  Social History     Tobacco Use   • Smoking status: Never Smoker   • Smokeless tobacco: Never Used   Substance Use Topics   • Alcohol use: Yes     Comment: OCCASIONALLY       Objective     Vital Signs:  Resp 16   Ht 177.8 cm (70\")   Wt 92.8 kg (204 lb 9.6 oz)   BMI 29.36 kg/m²   • Constitutional: healthy appearing, alert, no acute distress, reliable historian, here alone  • HENT:  NCAT, no visible deformities or lesions  • Eyes:  sclerae clear, conjunctivae clear, EOMI  • Neck:  normal appearance, no masses, trachea midline  • Respiratory:  breathing not labored, respiratory effort appears normal  • Cardiovascular:  heart regular rate  • Abdomen:  nondistended    • Skin and subcutaneous tissue:  Warm and dry, at the right lateral arm, there is a soft, well-circumscribed, mobile subcutaneous mass that is about 5 cm in diameter.  Skin overlying the mass is normal appearing.  Mass is tender to palpation.  • Musculoskeletal: moving all extremities symmetrically and purposefully  • Neurologic:  no obvious motor or sensory deficits, normal gait, able to stand without difficulty, cerebellar function without any obvious abnormalities, alert & oriented x 3, speech clear  • Psychiatric:  judgment and insight intact, mood normal, affect appropriate, cooperative    Assessment:  Mass of arm, right    Plan:  Excision of right arm subcutaneous mass    Discussion: Indications, options, risk, benefits, and expected outcomes of planned surgery were discussed with the patient and he agrees to proceed.    Jeff Tomas MD  01/03/2022    Electronically signed by Jeff Tomas MD, 01/03/22, 8:27 AM EST.        "

## 2022-01-07 PROBLEM — I25.10 CAD S/P PERCUTANEOUS CORONARY ANGIOPLASTY: Status: ACTIVE | Noted: 2022-01-07

## 2022-01-07 PROBLEM — Z98.61 CAD S/P PERCUTANEOUS CORONARY ANGIOPLASTY: Status: ACTIVE | Noted: 2022-01-07

## 2022-01-12 ENCOUNTER — OFFICE VISIT (OUTPATIENT)
Dept: CARDIOLOGY | Facility: CLINIC | Age: 62
End: 2022-01-12

## 2022-01-12 VITALS
DIASTOLIC BLOOD PRESSURE: 77 MMHG | HEART RATE: 73 BPM | BODY MASS INDEX: 29.2 KG/M2 | SYSTOLIC BLOOD PRESSURE: 135 MMHG | HEIGHT: 70 IN | WEIGHT: 204 LBS

## 2022-01-12 DIAGNOSIS — I25.10 CAD S/P PERCUTANEOUS CORONARY ANGIOPLASTY: ICD-10-CM

## 2022-01-12 DIAGNOSIS — Z98.61 CAD S/P PERCUTANEOUS CORONARY ANGIOPLASTY: ICD-10-CM

## 2022-01-12 DIAGNOSIS — I10 PRIMARY HYPERTENSION: Primary | ICD-10-CM

## 2022-01-12 DIAGNOSIS — E78.5 HYPERLIPIDEMIA LDL GOAL <70: ICD-10-CM

## 2022-01-12 PROCEDURE — 99214 OFFICE O/P EST MOD 30 MIN: CPT | Performed by: INTERNAL MEDICINE

## 2022-01-12 NOTE — ASSESSMENT & PLAN NOTE
Patient is on Repatha and Crestor 20 daily tolerating well states that he had some dietary indiscretions his LDL is mildly above goal.  Commended a trial of nutrition and exercise repeat lipids on next visit

## 2022-01-12 NOTE — PATIENT INSTRUCTIONS
Cholesterol Content in Foods  Cholesterol is a waxy, fat-like substance that helps to carry fat in the blood. The body needs cholesterol in small amounts, but too much cholesterol can cause damage to the arteries and heart.  Most people should eat less than 200 milligrams (mg) of cholesterol a day.  Foods with cholesterol    Cholesterol is found in animal-based foods, such as meat, seafood, and dairy. Generally, low-fat dairy and lean meats have less cholesterol than full-fat dairy and fatty meats. The milligrams of cholesterol per serving (mg per serving) of common cholesterol-containing foods are listed below.  Meat and other proteins  · Egg -- one large whole egg has 186 mg.  · Veal shank -- 4 oz has 141 mg.  · Lean ground turkey (93% lean) -- 4 oz has 118 mg.  · Fat-trimmed lamb loin -- 4 oz has 106 mg.  · Lean ground beef (90% lean) -- 4 oz has 100 mg.  · Lobster -- 3.5 oz has 90 mg.  · Pork loin chops -- 4 oz has 86 mg.  · Canned salmon -- 3.5 oz has 83 mg.  · Fat-trimmed beef top loin -- 4 oz has 78 mg.  · Frankfurter -- 1 kulwant (3.5 oz) has 77 mg.  · Crab -- 3.5 oz has 71 mg.  · Roasted chicken without skin, white meat -- 4 oz has 66 mg.  · Light bologna -- 2 oz has 45 mg.  · Deli-cut turkey -- 2 oz has 31 mg.  · Canned tuna -- 3.5 oz has 31 mg.  · Jauregui -- 1 oz has 29 mg.  · Oysters and mussels (raw) -- 3.5 oz has 25 mg.  · Mackerel -- 1 oz has 22 mg.  · Trout -- 1 oz has 20 mg.  · Pork sausage -- 1 link (1 oz) has 17 mg.  · [place] -- 1 oz has 16 mg.  · Tilapia -- 1 oz has 14 mg.  Dairy  · Soft-serve ice cream -- ½ cup (4 oz) has 103 mg.  · Whole-milk yogurt -- 1 cup (8 oz) has 29 mg.  · Cheddar cheese -- 1 oz has 28 mg.  · American cheese -- 1 oz has 28 mg.  · Whole milk -- 1 cup (8 oz) has 23 mg.  · 2% milk -- 1 cup (8 oz) has 18 mg.  · Cream cheese -- 1 tablespoon (Tbsp) has 15 mg.  · Cottage cheese -- ½ cup (4 oz) has 14 mg.  · Low-fat (1%) milk -- 1 cup (8 oz) has 10 mg.  · Sour cream -- 1 Tbsp has 8.5  mg.  · Low-fat yogurt -- 1 cup (8 oz) has 8 mg.  · Nonfat Greek yogurt -- 1 cup (8 oz) has 7 mg.  · Half-and-half cream -- 1 Tbsp has 5 mg.  Fats and oils  · Cod liver oil -- 1 tablespoon (Tbsp) has 82 mg.  · Butter -- 1 Tbsp has 15 mg.  · Lard -- 1 Tbsp has 14 mg.  · Jauregui grease -- 1 Tbsp has 14 mg.  · Mayonnaise -- 1 Tbsp has 5-10 mg.  · Margarine -- 1 Tbsp has 3-10 mg.  Exact amounts of cholesterol in these foods may vary depending on specific ingredients and brands.  Foods without cholesterol  Most plant-based foods do not have cholesterol unless you combine them with a food that has cholesterol. Foods without cholesterol include:  · Grains and cereals.  · Vegetables.  · Fruits.  · Vegetable oils, such as olive, canola, and sunflower oil.  · Legumes, such as peas, beans, and lentils.  · Nuts and seeds.  · Egg whites.  Summary  · The body needs cholesterol in small amounts, but too much cholesterol can cause damage to the arteries and heart.  · Most people should eat less than 200 milligrams (mg) of cholesterol a day.  This information is not intended to replace advice given to you by your health care provider. Make sure you discuss any questions you have with your health care provider.  Document Revised: 05/10/2021 Document Reviewed: 05/10/2021  Elsevier Patient Education © 2021 [employer]

## 2022-01-12 NOTE — PROGRESS NOTES
"Chief Complaint  Hyperlipidemia, Coronary Artery Disease, and no refills needed    Subjective    Patient is doing well denies any complaints or chest pain or shortness of breath  Past Medical History:   Diagnosis Date   • Abnormal ECG    • Coronary artery disease    • Heart disease     stent placed 2000   • HLD (hyperlipidemia)    • HTN (hypertension)    • Hyperlipemia          Current Outpatient Medications:   •  aspirin (aspirin) 81 MG EC tablet, Aspir-81 81 mg oral tablet,delayed release (DR/EC) take 1 tablet (81 mg) by oral route once daily   Active, Disp: , Rfl:   •  lisinopril (PRINIVIL,ZESTRIL) 20 MG tablet, TAKE 1 TABLET DAILY, Disp: 90 tablet, Rfl: 1  •  loratadine (Claritin) 10 MG tablet, Take 1 tablet by mouth Daily., Disp: 90 tablet, Rfl: 1  •  Repatha SureClick solution auto-injector SureClick injection, INJECT 1 ML (140 MG) UNDER THE SKIN EVERY 2 WEEKS IN THE ABDOMEN, THIGH OR OUTER AREA OF UPPER ARM (ROTATE SITES), Disp: 6 pen, Rfl: 2  •  rosuvastatin (CRESTOR) 20 MG tablet, TAKE 1 TABLET DAILY, Disp: 90 tablet, Rfl: 3  •  sildenafil (VIAGRA) 100 MG tablet, Take 100 mg by mouth Daily As Needed., Disp: , Rfl:     There are no discontinued medications.  No Known Allergies     Social History     Tobacco Use   • Smoking status: Never Smoker   • Smokeless tobacco: Never Used   Vaping Use   • Vaping Use: Never used   Substance Use Topics   • Alcohol use: Yes     Comment: OCCASIONALLY   • Drug use: Never       Family History   Problem Relation Age of Onset   • Heart disease Mother    • Heart disease Father    • Cancer Father    • Heart disease Sister    • Heart disease Brother    • Lung cancer Other    • Malig Hyperthermia Neg Hx         Objective     /77   Pulse 73   Ht 177.8 cm (70\")   Wt 92.5 kg (204 lb)   BMI 29.27 kg/m²       Physical Exam    General Appearance:   · no acute distress  · Alert and oriented x3  HENT:   · lips not cyanotic  · Atraumatic  Neck:  · No jvd "   · supple  Respiratory:  · no respiratory distress  · normal breath sounds  · no rales  Cardiovascular:  · Regular rate and rhythm  · no S3, no S4   · no murmur  · no rub  Extremities  · No cyanosis  · lower extremity edema: none    Skin:   · warm, dry  · No rashes      Result Review :     No results found for: PROBNP  CMP    CMP 6/21/21 1/3/22 1/3/22     0924 0924   Glucose 117 (A) 121 (A) 119 (A)   BUN 13 14 14   Creatinine 0.92 0.89 0.89   eGFR Non African Am 84 87 87   Sodium 136 135 (A) 135 (A)   Potassium 4.7 4.7 4.6   Chloride 104 100 100   Calcium 9.3 9.5 9.6   Albumin 4.20 4.20 4.20   Total Bilirubin 0.5 0.4 0.4   Alkaline Phosphatase 80 93 91   AST (SGOT) 26 24 23   ALT (SGPT) 35 35 35   (A) Abnormal value            CBC w/diff    CBC w/Diff 6/21/21 1/3/22   WBC 4.74 4.94   RBC 5.64 5.65   Hemoglobin 16.7 16.9   Hematocrit 49.4 49.0   MCV 87.6 86.7   MCH 29.6 29.9   MCHC 33.8 34.5   RDW 11.7 (A) 11.6 (A)   Platelets 235 208   Neutrophil Rel % 65.0 69.7   Immature Granulocyte Rel % 0.4 0.6 (A)   Lymphocyte Rel % 18.4 (A) 16.2 (A)   Monocyte Rel % 13.3 (A) 11.7   Eosinophil Rel % 2.3 1.2   Basophil Rel % 0.6 0.6   (A) Abnormal value             No results found for: TSH   No results found for: FREET4   No results found for: DDIMERQUANT  No results found for: MG   No results found for: DIGOXIN   No results found for: TROPONINT        Lipid Panel    Lipid Panel 6/21/21 1/3/22   Total Cholesterol 134 182   Triglycerides 38 58   HDL Cholesterol 77 (A) 91 (A)   VLDL Cholesterol 10 11   LDL Cholesterol  47 80   LDL/HDL Ratio 0.64 0.87   (A) Abnormal value            No results found for: POCTROP    Results for orders placed in visit on 06/14/21    Adult Transthoracic Echo Complete w/ Color, Spectral and Contrast if necessary per protocol    Interpretation Summary  · Calculated left ventricular 3D EF = 61% Left ventricular ejection fraction appears to be 56 - 60%.                 Diagnoses and all orders for this  visit:    1. Primary hypertension (Primary)  Assessment & Plan:  Blood pressure is at goal continue with lisinopril 20 mg once a day  Counseled patient on  • low-sodium diet of less than 2 g  • Aerobic activity 30 minutes a day 5 times a week  • Weight loss          2. CAD S/P percutaneous coronary angioplasty  Assessment & Plan:  Patient is doing well no ongoing angina continue with chronic aspirin 81 mg daily        3. Hyperlipidemia LDL goal <70  Assessment & Plan:  Patient is on Repatha and Crestor 20 daily tolerating well states that he had some dietary indiscretions his LDL is mildly above goal.  Commended a trial of nutrition and exercise repeat lipids on next visit    Orders:  -     Hepatic Function Panel; Future  -     Lipid Panel; Future          Follow Up     Return in about 6 months (around 7/12/2022) for EKG with F/U.          Patient was given instructions and counseling regarding his condition or for health maintenance advice. Please see specific information pulled into the AVS if appropriate.

## 2022-01-12 NOTE — ASSESSMENT & PLAN NOTE
Blood pressure is at goal continue with lisinopril 20 mg once a day  Counseled patient on  • low-sodium diet of less than 2 g  • Aerobic activity 30 minutes a day 5 times a week  • Weight loss

## 2022-02-07 ENCOUNTER — TELEPHONE (OUTPATIENT)
Dept: FAMILY MEDICINE CLINIC | Facility: CLINIC | Age: 62
End: 2022-02-07

## 2022-02-07 NOTE — TELEPHONE ENCOUNTER
LEFT MESSAGE THAT THE APPT FOR 6/6 NEEDS TO BE CANCELLED AND TO PLEASE CALL THE OFFICE SO WE CAN RESCHEDULE.

## 2022-02-22 ENCOUNTER — ANESTHESIA EVENT (OUTPATIENT)
Dept: PERIOP | Facility: HOSPITAL | Age: 62
End: 2022-02-22

## 2022-02-25 ENCOUNTER — ANESTHESIA (OUTPATIENT)
Dept: PERIOP | Facility: HOSPITAL | Age: 62
End: 2022-02-25

## 2022-02-25 ENCOUNTER — HOSPITAL ENCOUNTER (OUTPATIENT)
Facility: HOSPITAL | Age: 62
Setting detail: HOSPITAL OUTPATIENT SURGERY
Discharge: HOME | End: 2022-02-25
Attending: SURGERY | Admitting: SURGERY

## 2022-02-25 VITALS
RESPIRATION RATE: 16 BRPM | WEIGHT: 199.08 LBS | BODY MASS INDEX: 28.5 KG/M2 | SYSTOLIC BLOOD PRESSURE: 127 MMHG | OXYGEN SATURATION: 98 % | DIASTOLIC BLOOD PRESSURE: 73 MMHG | HEART RATE: 61 BPM | HEIGHT: 70 IN | TEMPERATURE: 98.1 F

## 2022-02-25 DIAGNOSIS — R22.31 MASS OF ARM, RIGHT: ICD-10-CM

## 2022-02-25 PROCEDURE — 25010000002 PROPOFOL 10 MG/ML EMULSION: Performed by: NURSE ANESTHETIST, CERTIFIED REGISTERED

## 2022-02-25 PROCEDURE — 25010000002 MIDAZOLAM PER 1 MG: Performed by: ANESTHESIOLOGY

## 2022-02-25 PROCEDURE — 11403 EXC TR-EXT B9+MARG 2.1-3CM: CPT | Performed by: SURGERY

## 2022-02-25 PROCEDURE — 88307 TISSUE EXAM BY PATHOLOGIST: CPT | Performed by: SURGERY

## 2022-02-25 RX ORDER — MAGNESIUM HYDROXIDE 1200 MG/15ML
LIQUID ORAL AS NEEDED
Status: DISCONTINUED | OUTPATIENT
Start: 2022-02-25 | End: 2022-02-25 | Stop reason: HOSPADM

## 2022-02-25 RX ORDER — ACETAMINOPHEN 500 MG
1000 TABLET ORAL ONCE
Status: COMPLETED | OUTPATIENT
Start: 2022-02-25 | End: 2022-02-25

## 2022-02-25 RX ORDER — OXYCODONE HYDROCHLORIDE 5 MG/1
5 TABLET ORAL
Status: DISCONTINUED | OUTPATIENT
Start: 2022-02-25 | End: 2022-02-25 | Stop reason: HOSPADM

## 2022-02-25 RX ORDER — PROMETHAZINE HYDROCHLORIDE 25 MG/1
25 SUPPOSITORY RECTAL ONCE AS NEEDED
Status: DISCONTINUED | OUTPATIENT
Start: 2022-02-25 | End: 2022-02-25 | Stop reason: HOSPADM

## 2022-02-25 RX ORDER — PROMETHAZINE HYDROCHLORIDE 12.5 MG/1
25 TABLET ORAL ONCE AS NEEDED
Status: DISCONTINUED | OUTPATIENT
Start: 2022-02-25 | End: 2022-02-25 | Stop reason: HOSPADM

## 2022-02-25 RX ORDER — SODIUM CHLORIDE, SODIUM LACTATE, POTASSIUM CHLORIDE, CALCIUM CHLORIDE 600; 310; 30; 20 MG/100ML; MG/100ML; MG/100ML; MG/100ML
9 INJECTION, SOLUTION INTRAVENOUS CONTINUOUS PRN
Status: DISCONTINUED | OUTPATIENT
Start: 2022-02-25 | End: 2022-02-25 | Stop reason: HOSPADM

## 2022-02-25 RX ORDER — HYDROCODONE BITARTRATE AND ACETAMINOPHEN 5; 325 MG/1; MG/1
1-2 TABLET ORAL EVERY 4 HOURS PRN
Qty: 6 TABLET | Refills: 0 | Status: SHIPPED | OUTPATIENT
Start: 2022-02-25 | End: 2022-06-15

## 2022-02-25 RX ORDER — MIDAZOLAM HYDROCHLORIDE 1 MG/ML
2 INJECTION INTRAMUSCULAR; INTRAVENOUS ONCE
Status: COMPLETED | OUTPATIENT
Start: 2022-02-25 | End: 2022-02-25

## 2022-02-25 RX ORDER — MEPERIDINE HYDROCHLORIDE 25 MG/ML
12.5 INJECTION INTRAMUSCULAR; INTRAVENOUS; SUBCUTANEOUS
Status: DISCONTINUED | OUTPATIENT
Start: 2022-02-25 | End: 2022-02-25 | Stop reason: HOSPADM

## 2022-02-25 RX ORDER — LIDOCAINE HYDROCHLORIDE 20 MG/ML
INJECTION, SOLUTION INFILTRATION; PERINEURAL AS NEEDED
Status: DISCONTINUED | OUTPATIENT
Start: 2022-02-25 | End: 2022-02-25 | Stop reason: SURG

## 2022-02-25 RX ORDER — BUPIVACAINE HYDROCHLORIDE 2.5 MG/ML
INJECTION, SOLUTION EPIDURAL; INFILTRATION; INTRACAUDAL AS NEEDED
Status: DISCONTINUED | OUTPATIENT
Start: 2022-02-25 | End: 2022-02-25 | Stop reason: HOSPADM

## 2022-02-25 RX ORDER — ONDANSETRON 2 MG/ML
4 INJECTION INTRAMUSCULAR; INTRAVENOUS ONCE AS NEEDED
Status: DISCONTINUED | OUTPATIENT
Start: 2022-02-25 | End: 2022-02-25 | Stop reason: HOSPADM

## 2022-02-25 RX ADMIN — SODIUM CHLORIDE, POTASSIUM CHLORIDE, SODIUM LACTATE AND CALCIUM CHLORIDE: 600; 310; 30; 20 INJECTION, SOLUTION INTRAVENOUS at 09:50

## 2022-02-25 RX ADMIN — ACETAMINOPHEN 1000 MG: 500 TABLET ORAL at 09:45

## 2022-02-25 RX ADMIN — LIDOCAINE HYDROCHLORIDE 100 MG: 20 INJECTION, SOLUTION INFILTRATION; PERINEURAL at 09:50

## 2022-02-25 RX ADMIN — PROPOFOL 200 MCG/KG/MIN: 10 INJECTION, EMULSION INTRAVENOUS at 09:50

## 2022-02-25 RX ADMIN — MIDAZOLAM 2 MG: 1 INJECTION INTRAMUSCULAR; INTRAVENOUS at 09:45

## 2022-02-25 NOTE — ANESTHESIA PREPROCEDURE EVALUATION
Anesthesia Evaluation     Patient summary reviewed and Nursing notes reviewed                Airway   Mallampati: I  TM distance: >3 FB  Neck ROM: full  No difficulty expected  Dental      Pulmonary - negative pulmonary ROS and normal exam    breath sounds clear to auscultation  Cardiovascular - normal exam    Rhythm: regular  Rate: normal    (+) hypertension, CAD, hyperlipidemia,       Neuro/Psych- negative ROS  GI/Hepatic/Renal/Endo - negative ROS     Musculoskeletal (-) negative ROS    Abdominal    Substance History - negative use     OB/GYN negative ob/gyn ROS         Other                        Anesthesia Plan    ASA 3     general     intravenous induction     Anesthetic plan, all risks, benefits, and alternatives have been provided, discussed and informed consent has been obtained with: patient.        CODE STATUS:

## 2022-02-25 NOTE — ANESTHESIA POSTPROCEDURE EVALUATION
Patient: Carlos Manuel Sanchez    Procedure Summary     Date: 02/25/22 Room / Location: [facility] OR 08 / [facility] MAIN OR    Anesthesia Start: 0950 Anesthesia Stop: 1024    Procedure: excision of subcutaneous mass from right arm (Right Arm Upper) Diagnosis:       Mass of arm, right      (Mass of arm, right [R22.31])    Surgeons: Jeff Tomas MD Provider: Anand Kirk MD    Anesthesia Type: general ASA Status: 3          Anesthesia Type: general    Vitals  Vitals Value Taken Time   /75 02/25/22 1100   Temp 36.2 °C (97.2 °F) 02/25/22 1100   Pulse 60 02/25/22 1100   Resp 16 02/25/22 1100   SpO2 98 % 02/25/22 1100           Post Anesthesia Care and Evaluation    Patient location during evaluation: bedside  Patient participation: complete - patient participated  Level of consciousness: awake  Pain management: adequate  Airway patency: patent  Anesthetic complications: No anesthetic complications  PONV Status: none  Cardiovascular status: acceptable and stable  Respiratory status: acceptable  Hydration status: acceptable    Comments: An Anesthesiologist personally participated in the most demanding procedures (including induction and emergence if applicable) in the anesthesia plan, monitored the course of anesthesia administration at frequent intervals and remained physically present and available for immediate diagnosis and treatment of emergencies.

## 2022-03-07 ENCOUNTER — TELEPHONE (OUTPATIENT)
Dept: SURGERY | Facility: CLINIC | Age: 62
End: 2022-03-07

## 2022-03-07 NOTE — TELEPHONE ENCOUNTER
Caller: Carlos Manuel Sanchez    Relationship to patient: Self    Best call back number: [phone]    Patient is needing: PT IS CALLING TO CANCEL POST-OP APPT ON 3/11 WITH DR MARINO. PT STATES HE IS HEALING WELL AND THE STITCHES ARE DOING FINE.    ATTEMPTED TO TRANSFER; NO ANSWER AT PRACTICE    PLEASE CANCEL APPT IF OKAY TO CANCEL. IF APPT NEEDS TO BE KEPT, PLEASE CONTACT PT TO ADVISE.    PT CAN BE REACHED AT ANYTIME; OKAY TO LEAVE MESSAGE IF NO ANSWER

## 2022-05-27 RX ORDER — LISINOPRIL 20 MG/1
TABLET ORAL
Qty: 90 TABLET | Refills: 1 | Status: SHIPPED | OUTPATIENT
Start: 2022-05-27 | End: 2022-11-23

## 2022-06-08 ENCOUNTER — LAB (OUTPATIENT)
Dept: FAMILY MEDICINE CLINIC | Facility: CLINIC | Age: 62
End: 2022-06-08

## 2022-06-08 ENCOUNTER — TELEPHONE (OUTPATIENT)
Dept: FAMILY MEDICINE CLINIC | Facility: CLINIC | Age: 62
End: 2022-06-08

## 2022-06-08 DIAGNOSIS — I10 ESSENTIAL HYPERTENSION: Primary | ICD-10-CM

## 2022-06-08 DIAGNOSIS — E78.00 PURE HYPERCHOLESTEROLEMIA: ICD-10-CM

## 2022-06-08 DIAGNOSIS — R73.03 PREDIABETES: ICD-10-CM

## 2022-06-08 DIAGNOSIS — E78.5 HYPERLIPIDEMIA LDL GOAL <100: ICD-10-CM

## 2022-06-08 DIAGNOSIS — E53.8 B12 DEFICIENCY: ICD-10-CM

## 2022-06-08 NOTE — TELEPHONE ENCOUNTER
Patient came in office today to get labs done prior to his appt next week, there is no orders placed. Please advise

## 2022-06-09 ENCOUNTER — LAB (OUTPATIENT)
Dept: FAMILY MEDICINE CLINIC | Facility: CLINIC | Age: 62
End: 2022-06-09

## 2022-06-09 DIAGNOSIS — I10 ESSENTIAL HYPERTENSION: ICD-10-CM

## 2022-06-09 DIAGNOSIS — E53.8 B12 DEFICIENCY: ICD-10-CM

## 2022-06-09 DIAGNOSIS — E78.00 PURE HYPERCHOLESTEROLEMIA: ICD-10-CM

## 2022-06-09 DIAGNOSIS — R73.03 PREDIABETES: ICD-10-CM

## 2022-06-09 LAB
ALBUMIN SERPL-MCNC: 4 G/DL (ref 3.5–5.2)
ALBUMIN/GLOB SERPL: 1.6 G/DL
ALP SERPL-CCNC: 81 U/L (ref 39–117)
ALT SERPL W P-5'-P-CCNC: 32 U/L (ref 1–41)
ANION GAP SERPL CALCULATED.3IONS-SCNC: 10.1 MMOL/L (ref 5–15)
AST SERPL-CCNC: 28 U/L (ref 1–40)
BASOPHILS # BLD AUTO: 0.03 10*3/MM3 (ref 0–0.2)
BASOPHILS NFR BLD AUTO: 0.6 % (ref 0–1.5)
BILIRUB SERPL-MCNC: 0.5 MG/DL (ref 0–1.2)
BUN SERPL-MCNC: 16 MG/DL (ref 8–23)
BUN/CREAT SERPL: 15.8 (ref 7–25)
CALCIUM SPEC-SCNC: 9.1 MG/DL (ref 8.6–10.5)
CHLORIDE SERPL-SCNC: 101 MMOL/L (ref 98–107)
CHOLEST SERPL-MCNC: 159 MG/DL (ref 0–200)
CO2 SERPL-SCNC: 21.9 MMOL/L (ref 22–29)
CREAT SERPL-MCNC: 1.01 MG/DL (ref 0.76–1.27)
DEPRECATED RDW RBC AUTO: 37.2 FL (ref 37–54)
EGFRCR SERPLBLD CKD-EPI 2021: 85.1 ML/MIN/1.73
EOSINOPHIL # BLD AUTO: 0.14 10*3/MM3 (ref 0–0.4)
EOSINOPHIL NFR BLD AUTO: 2.8 % (ref 0.3–6.2)
ERYTHROCYTE [DISTWIDTH] IN BLOOD BY AUTOMATED COUNT: 11.7 % (ref 12.3–15.4)
GLOBULIN UR ELPH-MCNC: 2.5 GM/DL
GLUCOSE SERPL-MCNC: 117 MG/DL (ref 65–99)
HBA1C MFR BLD: 6.1 % (ref 4.8–5.6)
HCT VFR BLD AUTO: 48.9 % (ref 37.5–51)
HDLC SERPL-MCNC: 80 MG/DL (ref 40–60)
HGB BLD-MCNC: 16.2 G/DL (ref 13–17.7)
IMM GRANULOCYTES # BLD AUTO: 0.02 10*3/MM3 (ref 0–0.05)
IMM GRANULOCYTES NFR BLD AUTO: 0.4 % (ref 0–0.5)
LDLC SERPL CALC-MCNC: 70 MG/DL (ref 0–100)
LDLC/HDLC SERPL: 0.88 {RATIO}
LYMPHOCYTES # BLD AUTO: 0.94 10*3/MM3 (ref 0.7–3.1)
LYMPHOCYTES NFR BLD AUTO: 18.8 % (ref 19.6–45.3)
MCH RBC QN AUTO: 29.2 PG (ref 26.6–33)
MCHC RBC AUTO-ENTMCNC: 33.1 G/DL (ref 31.5–35.7)
MCV RBC AUTO: 88.1 FL (ref 79–97)
MONOCYTES # BLD AUTO: 0.64 10*3/MM3 (ref 0.1–0.9)
MONOCYTES NFR BLD AUTO: 12.8 % (ref 5–12)
NEUTROPHILS NFR BLD AUTO: 3.23 10*3/MM3 (ref 1.7–7)
NEUTROPHILS NFR BLD AUTO: 64.6 % (ref 42.7–76)
NRBC BLD AUTO-RTO: 0 /100 WBC (ref 0–0.2)
PLATELET # BLD AUTO: 191 10*3/MM3 (ref 140–450)
PMV BLD AUTO: 9.6 FL (ref 6–12)
POTASSIUM SERPL-SCNC: 4.7 MMOL/L (ref 3.5–5.2)
PROT SERPL-MCNC: 6.5 G/DL (ref 6–8.5)
RBC # BLD AUTO: 5.55 10*6/MM3 (ref 4.14–5.8)
SODIUM SERPL-SCNC: 133 MMOL/L (ref 136–145)
TRIGL SERPL-MCNC: 42 MG/DL (ref 0–150)
VIT B12 BLD-MCNC: 304 PG/ML (ref 211–946)
VLDLC SERPL-MCNC: 9 MG/DL (ref 5–40)
WBC NRBC COR # BLD: 5 10*3/MM3 (ref 3.4–10.8)

## 2022-06-09 PROCEDURE — 85025 COMPLETE CBC W/AUTO DIFF WBC: CPT | Performed by: FAMILY MEDICINE

## 2022-06-09 PROCEDURE — 82607 VITAMIN B-12: CPT | Performed by: FAMILY MEDICINE

## 2022-06-09 PROCEDURE — 36415 COLL VENOUS BLD VENIPUNCTURE: CPT | Performed by: NURSE PRACTITIONER

## 2022-06-09 PROCEDURE — 80053 COMPREHEN METABOLIC PANEL: CPT | Performed by: FAMILY MEDICINE

## 2022-06-09 PROCEDURE — 83036 HEMOGLOBIN GLYCOSYLATED A1C: CPT | Performed by: FAMILY MEDICINE

## 2022-06-09 PROCEDURE — 80061 LIPID PANEL: CPT | Performed by: FAMILY MEDICINE

## 2022-06-15 ENCOUNTER — OFFICE VISIT (OUTPATIENT)
Dept: FAMILY MEDICINE CLINIC | Facility: CLINIC | Age: 62
End: 2022-06-15

## 2022-06-15 VITALS
BODY MASS INDEX: 28.39 KG/M2 | WEIGHT: 198.3 LBS | OXYGEN SATURATION: 97 % | SYSTOLIC BLOOD PRESSURE: 128 MMHG | TEMPERATURE: 98 F | DIASTOLIC BLOOD PRESSURE: 82 MMHG | HEIGHT: 70 IN | HEART RATE: 76 BPM

## 2022-06-15 DIAGNOSIS — L50.2 URTICARIA DUE TO COLD: ICD-10-CM

## 2022-06-15 DIAGNOSIS — R73.03 PREDIABETES: ICD-10-CM

## 2022-06-15 DIAGNOSIS — E87.1 HYPONATREMIA: ICD-10-CM

## 2022-06-15 DIAGNOSIS — I10 PRIMARY HYPERTENSION: Primary | ICD-10-CM

## 2022-06-15 DIAGNOSIS — E53.8 B12 DEFICIENCY: ICD-10-CM

## 2022-06-15 DIAGNOSIS — Z12.5 SCREENING FOR PROSTATE CANCER: ICD-10-CM

## 2022-06-15 DIAGNOSIS — D17.21 LIPOMA OF RIGHT UPPER EXTREMITY: ICD-10-CM

## 2022-06-15 DIAGNOSIS — E78.00 PURE HYPERCHOLESTEROLEMIA: ICD-10-CM

## 2022-06-15 DIAGNOSIS — Z51.81 MEDICATION MONITORING ENCOUNTER: ICD-10-CM

## 2022-06-15 DIAGNOSIS — L03.115 CELLULITIS OF RIGHT LOWER EXTREMITY: ICD-10-CM

## 2022-06-15 PROCEDURE — 99214 OFFICE O/P EST MOD 30 MIN: CPT | Performed by: FAMILY MEDICINE

## 2022-06-15 RX ORDER — DOXYCYCLINE 100 MG/1
100 TABLET ORAL 2 TIMES DAILY
Qty: 20 TABLET | Refills: 0 | Status: SHIPPED | OUTPATIENT
Start: 2022-06-15 | End: 2022-08-03

## 2022-06-15 RX ORDER — LORATADINE 10 MG/1
10 TABLET ORAL 2 TIMES DAILY PRN
Qty: 180 TABLET | Refills: 3 | Status: SHIPPED | OUTPATIENT
Start: 2022-06-15

## 2022-06-15 NOTE — PROGRESS NOTES
"Chief Complaint  Hypertension  Tick bite  hld    Subjective        Carlos Manuel Sanchez presents to [facility]  History of Present Illness  Patient presents today to follow-up for hypertension and hyperlipidemia.  His blood pressure has been adequately controlled taking lisinopril 20 mg daily.  He is taking Repatha 140 mg every 2 weeks as well as Crestor 20 mg daily for hypercholesterolemia.  He continues to see Dr. Clements.  He did have labs done prior to the appointment.  His LDL cholesterol is 70 with triglycerides at 42 and total cholesterol at 159.  HDL was 80.  He is still in the prediabetic range for his A1c but it has improved down to 6.1%.  We discussed lifestyle changes including diet and exercise.  His CBC shows no anemia with normal white blood cell count and platelets.  CMP shows a slightly low sodium level at 133.  He does report drinking about a gallon of water a day.  We discussed reducing his water intake some and replacing it with electrolyte containing liquids such as Gatorade.  Plan to monitor at this time.  His B12 level is on the lower end of normal.  I discussed with him taking a B complex vitamin or multivitamin containing B12.  He had a lipoma removed from his right arm.  He reports that surgery went well.  He was seen by Dr. Tomas.  Patient has cold induced urticaria which has been controlled better taking Claritin.  We did discuss the option of taking 10 mg twice daily if needed.  He reports overall being satisfied with current treatment.  He reports having a couple tick bites, for recently.  He has 1 that is a little bit more red.  He has a total of 3 tick bites on his right lower extremity.  Objective   Vital Signs:  /82   Pulse 76   Temp 98 °F (36.7 °C)   Ht 177.8 cm (70\")   Wt 89.9 kg (198 lb 4.8 oz)   SpO2 97%   BMI 28.45 kg/m²   Estimated body mass index is 28.45 kg/m² as calculated from the following:    Height as of this encounter: 177.8 cm " "(70\").    Weight as of this encounter: 89.9 kg (198 lb 4.8 oz).          Physical Exam   General: AAO ×3, no acute distress, pleasant  HEENT: Normocephalic, atraumatic  Skin: Erythema surrounding a tick bite on the right lower extremity.  The other 2 tick bites that are more distal appear to be healing with no evidence of infection.  No concerning rash such as erythema migrans.  No fluctuance appreciated.  Cardiovascular: Regular rate and rhythm without appreciable murmur  Respiratory: Clear to auscultation bilaterally no RRW  Gastrointestinal: Soft nontender nondistended with bowel sounds present  extremities: No edema  Neurologic: CN II through XII grossly intact   Psychiatric: Normal mood and affect  Result Review :                Assessment and Plan   Diagnoses and all orders for this visit:    1. Primary hypertension (Primary)  -     CBC & Differential; Future  -     Comprehensive Metabolic Panel; Future    2. Lipoma of right upper extremity    3. Pure hypercholesterolemia  -     Lipid Panel; Future    4. Urticaria due to cold    5. Prediabetes  -     Hemoglobin A1c; Future    6. Hyponatremia  -     Comprehensive Metabolic Panel; Future    7. B12 deficiency  -     Vitamin B12; Future  -     Lipid Panel; Future    8. Medication monitoring encounter  -     CBC & Differential; Future  -     Comprehensive Metabolic Panel; Future  -     Vitamin B12; Future  -     Lipid Panel; Future  -     Hemoglobin A1c; Future    9. Screening for prostate cancer  -     PSA Screen; Future    10. Cellulitis of right lower extremity    Other orders  -     loratadine (Claritin) 10 MG tablet; Take 1 tablet by mouth 2 (Two) Times a Day As Needed (urticaria).  Dispense: 180 tablet; Refill: 3  -     doxycycline (ADOXA) 100 MG tablet; Take 1 tablet by mouth 2 (Two) Times a Day.  Dispense: 20 tablet; Refill: 0    I discussed with patient that he has developed some cellulitis of the right lower extremity.  I do not see any foreign body " present on exam.  I discussed treatment with doxycycline to cover for tickborne illness.  Patient encouraged to take antibiotic as prescribed.  We discussed continue current management of hypertension and hyperlipidemia.  We discussed continue current management of cold induced urticaria.  I encouraged him to reduce his water intake as I suspect this is contributing to hyponatremia.  Plan to see patient back in 6 months or sooner if needed.  Patient instructed to call with any questions or concerns.         Follow Up   Return in about 6 months (around 12/15/2022) for hypertension.  Patient was given instructions and counseling regarding his condition or for health maintenance advice. Please see specific information pulled into the AVS if appropriate.

## 2022-06-22 RX ORDER — EVOLOCUMAB 140 MG/ML
INJECTION, SOLUTION SUBCUTANEOUS
Qty: 6 ML | Refills: 3 | Status: SHIPPED | OUTPATIENT
Start: 2022-06-22

## 2022-06-24 ENCOUNTER — TELEPHONE (OUTPATIENT)
Dept: CARDIOLOGY | Facility: CLINIC | Age: 62
End: 2022-06-24

## 2022-07-11 ENCOUNTER — LAB (OUTPATIENT)
Dept: FAMILY MEDICINE CLINIC | Facility: CLINIC | Age: 62
End: 2022-07-11

## 2022-07-11 DIAGNOSIS — Z12.5 SCREENING FOR PROSTATE CANCER: ICD-10-CM

## 2022-07-11 DIAGNOSIS — E53.8 B12 DEFICIENCY: ICD-10-CM

## 2022-07-11 DIAGNOSIS — R73.03 PREDIABETES: ICD-10-CM

## 2022-07-11 DIAGNOSIS — I10 PRIMARY HYPERTENSION: ICD-10-CM

## 2022-07-11 DIAGNOSIS — Z51.81 MEDICATION MONITORING ENCOUNTER: ICD-10-CM

## 2022-07-11 DIAGNOSIS — E78.00 PURE HYPERCHOLESTEROLEMIA: ICD-10-CM

## 2022-07-11 DIAGNOSIS — E87.1 HYPONATREMIA: ICD-10-CM

## 2022-07-11 LAB
ALBUMIN SERPL-MCNC: 4.1 G/DL (ref 3.5–5.2)
ALBUMIN/GLOB SERPL: 1.6 G/DL
ALP SERPL-CCNC: 72 U/L (ref 39–117)
ALT SERPL W P-5'-P-CCNC: 35 U/L (ref 1–41)
ANION GAP SERPL CALCULATED.3IONS-SCNC: 8.7 MMOL/L (ref 5–15)
AST SERPL-CCNC: 28 U/L (ref 1–40)
BASOPHILS # BLD AUTO: 0.02 10*3/MM3 (ref 0–0.2)
BASOPHILS NFR BLD AUTO: 0.4 % (ref 0–1.5)
BILIRUB SERPL-MCNC: 0.4 MG/DL (ref 0–1.2)
BUN SERPL-MCNC: 15 MG/DL (ref 8–23)
BUN/CREAT SERPL: 12 (ref 7–25)
CALCIUM SPEC-SCNC: 9.5 MG/DL (ref 8.6–10.5)
CHLORIDE SERPL-SCNC: 103 MMOL/L (ref 98–107)
CHOLEST SERPL-MCNC: 144 MG/DL (ref 0–200)
CO2 SERPL-SCNC: 27.3 MMOL/L (ref 22–29)
CREAT SERPL-MCNC: 1.25 MG/DL (ref 0.76–1.27)
DEPRECATED RDW RBC AUTO: 37.3 FL (ref 37–54)
EGFRCR SERPLBLD CKD-EPI 2021: 65.9 ML/MIN/1.73
EOSINOPHIL # BLD AUTO: 0.12 10*3/MM3 (ref 0–0.4)
EOSINOPHIL NFR BLD AUTO: 2.5 % (ref 0.3–6.2)
ERYTHROCYTE [DISTWIDTH] IN BLOOD BY AUTOMATED COUNT: 11.7 % (ref 12.3–15.4)
GLOBULIN UR ELPH-MCNC: 2.6 GM/DL
GLUCOSE SERPL-MCNC: 114 MG/DL (ref 65–99)
HBA1C MFR BLD: 6.2 % (ref 4.8–5.6)
HCT VFR BLD AUTO: 48.6 % (ref 37.5–51)
HDLC SERPL-MCNC: 76 MG/DL (ref 40–60)
HGB BLD-MCNC: 16.4 G/DL (ref 13–17.7)
IMM GRANULOCYTES # BLD AUTO: 0.01 10*3/MM3 (ref 0–0.05)
IMM GRANULOCYTES NFR BLD AUTO: 0.2 % (ref 0–0.5)
LDLC SERPL CALC-MCNC: 59 MG/DL (ref 0–100)
LDLC/HDLC SERPL: 0.81 {RATIO}
LYMPHOCYTES # BLD AUTO: 0.87 10*3/MM3 (ref 0.7–3.1)
LYMPHOCYTES NFR BLD AUTO: 18 % (ref 19.6–45.3)
MCH RBC QN AUTO: 29.7 PG (ref 26.6–33)
MCHC RBC AUTO-ENTMCNC: 33.7 G/DL (ref 31.5–35.7)
MCV RBC AUTO: 87.9 FL (ref 79–97)
MONOCYTES # BLD AUTO: 0.6 10*3/MM3 (ref 0.1–0.9)
MONOCYTES NFR BLD AUTO: 12.4 % (ref 5–12)
NEUTROPHILS NFR BLD AUTO: 3.21 10*3/MM3 (ref 1.7–7)
NEUTROPHILS NFR BLD AUTO: 66.5 % (ref 42.7–76)
NRBC BLD AUTO-RTO: 0 /100 WBC (ref 0–0.2)
PLATELET # BLD AUTO: 184 10*3/MM3 (ref 140–450)
PMV BLD AUTO: 10.2 FL (ref 6–12)
POTASSIUM SERPL-SCNC: 4.7 MMOL/L (ref 3.5–5.2)
PROT SERPL-MCNC: 6.7 G/DL (ref 6–8.5)
PSA SERPL-MCNC: 0.79 NG/ML (ref 0–4)
RBC # BLD AUTO: 5.53 10*6/MM3 (ref 4.14–5.8)
SODIUM SERPL-SCNC: 139 MMOL/L (ref 136–145)
TRIGL SERPL-MCNC: 33 MG/DL (ref 0–150)
VLDLC SERPL-MCNC: 9 MG/DL (ref 5–40)
WBC NRBC COR # BLD: 4.83 10*3/MM3 (ref 3.4–10.8)

## 2022-07-11 PROCEDURE — G0103 PSA SCREENING: HCPCS | Performed by: FAMILY MEDICINE

## 2022-07-11 PROCEDURE — 80061 LIPID PANEL: CPT | Performed by: FAMILY MEDICINE

## 2022-07-11 PROCEDURE — 85025 COMPLETE CBC W/AUTO DIFF WBC: CPT | Performed by: FAMILY MEDICINE

## 2022-07-11 PROCEDURE — 36415 COLL VENOUS BLD VENIPUNCTURE: CPT | Performed by: NURSE PRACTITIONER

## 2022-07-11 PROCEDURE — 80053 COMPREHEN METABOLIC PANEL: CPT | Performed by: FAMILY MEDICINE

## 2022-07-11 PROCEDURE — 82607 VITAMIN B-12: CPT | Performed by: FAMILY MEDICINE

## 2022-07-11 PROCEDURE — 83036 HEMOGLOBIN GLYCOSYLATED A1C: CPT | Performed by: FAMILY MEDICINE

## 2022-07-12 LAB — VIT B12 BLD-MCNC: 395 PG/ML (ref 211–946)

## 2022-07-13 ENCOUNTER — TELEPHONE (OUTPATIENT)
Dept: FAMILY MEDICINE CLINIC | Facility: CLINIC | Age: 62
End: 2022-07-13

## 2022-07-13 NOTE — TELEPHONE ENCOUNTER
Received phone call from patient stating that he was supposed to have Dr Roland labs when here for Dr Arango's lab. Patient stated that he will coming in the morning for a Covid booster. Offered to obtain the lab when he comes in. Patient stated he will be here in the am. Will clarify the labs need for Dr Clements. Dr Roland office called with message left. Awaiting return call.

## 2022-07-14 ENCOUNTER — IMMUNIZATION (OUTPATIENT)
Dept: FAMILY MEDICINE CLINIC | Facility: CLINIC | Age: 62
End: 2022-07-14

## 2022-07-14 ENCOUNTER — LAB (OUTPATIENT)
Dept: FAMILY MEDICINE CLINIC | Facility: CLINIC | Age: 62
End: 2022-07-14

## 2022-07-14 DIAGNOSIS — E78.5 HYPERLIPIDEMIA LDL GOAL <70: ICD-10-CM

## 2022-07-14 DIAGNOSIS — Z23 NEED FOR COVID-19 VACCINE: Primary | ICD-10-CM

## 2022-07-14 LAB
ALBUMIN SERPL-MCNC: 4.2 G/DL (ref 3.5–5.2)
ALP SERPL-CCNC: 76 U/L (ref 39–117)
ALT SERPL W P-5'-P-CCNC: 41 U/L (ref 1–41)
AST SERPL-CCNC: 36 U/L (ref 1–40)
BILIRUB CONJ SERPL-MCNC: <0.2 MG/DL (ref 0–0.3)
BILIRUB INDIRECT SERPL-MCNC: NORMAL MG/DL
BILIRUB SERPL-MCNC: 0.5 MG/DL (ref 0–1.2)
CHOLEST SERPL-MCNC: 141 MG/DL (ref 0–200)
HDLC SERPL-MCNC: 76 MG/DL (ref 40–60)
LDLC SERPL CALC-MCNC: 53 MG/DL (ref 0–100)
LDLC/HDLC SERPL: 0.7 {RATIO}
PROT SERPL-MCNC: 7 G/DL (ref 6–8.5)
TRIGL SERPL-MCNC: 59 MG/DL (ref 0–150)
VLDLC SERPL-MCNC: 12 MG/DL (ref 5–40)

## 2022-07-14 PROCEDURE — 80076 HEPATIC FUNCTION PANEL: CPT | Performed by: INTERNAL MEDICINE

## 2022-07-14 PROCEDURE — 91305 COVID-19 (PFIZER) 12+ YRS: CPT | Performed by: FAMILY MEDICINE

## 2022-07-14 PROCEDURE — 80061 LIPID PANEL: CPT | Performed by: INTERNAL MEDICINE

## 2022-07-14 PROCEDURE — 0051A COVID-19 (PFIZER) 12+ YRS: CPT | Performed by: FAMILY MEDICINE

## 2022-08-03 ENCOUNTER — OFFICE VISIT (OUTPATIENT)
Dept: CARDIOLOGY | Facility: CLINIC | Age: 62
End: 2022-08-03

## 2022-08-03 VITALS
BODY MASS INDEX: 28.8 KG/M2 | SYSTOLIC BLOOD PRESSURE: 124 MMHG | WEIGHT: 201.2 LBS | HEART RATE: 72 BPM | DIASTOLIC BLOOD PRESSURE: 73 MMHG | HEIGHT: 70 IN

## 2022-08-03 DIAGNOSIS — E78.5 HYPERLIPIDEMIA LDL GOAL <70: ICD-10-CM

## 2022-08-03 DIAGNOSIS — Z98.61 CAD S/P PERCUTANEOUS CORONARY ANGIOPLASTY: Primary | ICD-10-CM

## 2022-08-03 DIAGNOSIS — I25.10 CAD S/P PERCUTANEOUS CORONARY ANGIOPLASTY: Primary | ICD-10-CM

## 2022-08-03 DIAGNOSIS — I10 PRIMARY HYPERTENSION: ICD-10-CM

## 2022-08-03 PROCEDURE — 99214 OFFICE O/P EST MOD 30 MIN: CPT | Performed by: INTERNAL MEDICINE

## 2022-08-03 PROCEDURE — 93000 ELECTROCARDIOGRAM COMPLETE: CPT | Performed by: INTERNAL MEDICINE

## 2022-08-03 RX ORDER — PYRIDOXINE HCL (VITAMIN B6) 100 MG
TABLET ORAL
COMMUNITY

## 2022-08-03 NOTE — PROGRESS NOTES
Chief Complaint  Hyperlipidemia, Heart Problem, and Hypertension    Subjective    Patient has been doing well symptomatically with no anginal discomfort.  He has not had any weight gain denies any change in his exercise capacity    Past Medical History:   Diagnosis Date   • Abnormal ECG    • Coronary artery disease     BLOCKAGE 1 STENT PLACED 2000, SEE'S DR WARD, DENIED CP/SOB    • Heart disease     stent placed 2000   • HLD (hyperlipidemia)    • HTN (hypertension)    • Hyperlipemia    • Mass of arm, right          Current Outpatient Medications:   •  aspirin 81 MG EC tablet, Take 81 mg by mouth Daily. PT REPORTED INSTRUCTED CONTINUE TAKING FOR SURGERY, Disp: , Rfl:   •  B Complex-Biotin-FA (B Complete) tablet, Take  by mouth., Disp: , Rfl:   •  lisinopril (PRINIVIL,ZESTRIL) 20 MG tablet, TAKE 1 TABLET DAILY, Disp: 90 tablet, Rfl: 1  •  loratadine (Claritin) 10 MG tablet, Take 1 tablet by mouth 2 (Two) Times a Day As Needed (urticaria)., Disp: 180 tablet, Rfl: 3  •  Repatha SureClick solution auto-injector SureClick injection, INJECT 1 ML (140 MG) UNDER THE SKIN EVERY 2 WEEKS IN THE ABDOMEN, THIGH OR OUTER AREA OF UPPER ARM (ROTATE SITES), Disp: 6 mL, Rfl: 3  •  rosuvastatin (CRESTOR) 20 MG tablet, TAKE 1 TABLET DAILY, Disp: 90 tablet, Rfl: 3  •  sildenafil (VIAGRA) 100 MG tablet, Take 100 mg by mouth Daily As Needed., Disp: , Rfl:     Medications Discontinued During This Encounter   Medication Reason   • doxycycline (ADOXA) 100 MG tablet *Therapy completed     No Known Allergies     Social History     Tobacco Use   • Smoking status: Never Smoker   • Smokeless tobacco: Never Used   Vaping Use   • Vaping Use: Never used   Substance Use Topics   • Alcohol use: Yes     Comment: OCCASIONALLY 2/24/22 2 beers   • Drug use: Never       Family History   Problem Relation Age of Onset   • Heart disease Mother    • Heart disease Father    • Cancer Father    • Heart disease Sister    • Heart disease Brother    • Lung cancer  "Other    • Malig Hyperthermia Neg Hx         Objective     /73   Pulse 72   Ht 177.8 cm (70\")   Wt 91.3 kg (201 lb 3.2 oz)   BMI 28.87 kg/m²       Physical Exam    General Appearance:   · no acute distress  · Alert and oriented x3  HENT:   · lips not cyanotic  · Atraumatic  Neck:  · No jvd   · supple  Respiratory:  · no respiratory distress  · normal breath sounds  · no rales  Cardiovascular:  · Regular rate and rhythm  · no S3, no S4   · no murmur  · no rub  Extremities  · No cyanosis  · lower extremity edema: none    Skin:   · warm, dry  · No rashes      Result Review :     No results found for: PROBNP  CMP    CMP 6/9/22 7/11/22 7/14/22   Glucose 117 (A) 114 (A)    BUN 16 15    Creatinine 1.01 1.25    Sodium 133 (A) 139    Potassium 4.7 4.7    Chloride 101 103    Calcium 9.1 9.5    Albumin 4.00 4.10 4.20   Total Bilirubin 0.5 0.4 0.5   Alkaline Phosphatase 81 72 76   AST (SGOT) 28 28 36   ALT (SGPT) 32 35 41   (A) Abnormal value            CBC w/diff    CBC w/Diff 1/3/22 6/9/22 7/11/22   WBC 4.94 5.00 4.83   RBC 5.65 5.55 5.53   Hemoglobin 16.9 16.2 16.4   Hematocrit 49.0 48.9 48.6   MCV 86.7 88.1 87.9   MCH 29.9 29.2 29.7   MCHC 34.5 33.1 33.7   RDW 11.6 (A) 11.7 (A) 11.7 (A)   Platelets 208 191 184   Neutrophil Rel % 69.7 64.6 66.5   Immature Granulocyte Rel % 0.6 (A) 0.4 0.2   Lymphocyte Rel % 16.2 (A) 18.8 (A) 18.0 (A)   Monocyte Rel % 11.7 12.8 (A) 12.4 (A)   Eosinophil Rel % 1.2 2.8 2.5   Basophil Rel % 0.6 0.6 0.4   (A) Abnormal value             No results found for: TSH   No results found for: FREET4   No results found for: DDIMERQUANT  No results found for: MG   No results found for: DIGOXIN   No results found for: TROPONINT        Lipid Panel    Lipid Panel 6/9/22 7/11/22 7/14/22   Total Cholesterol 159 144 141   Triglycerides 42 33 59   HDL Cholesterol 80 (A) 76 (A) 76 (A)   VLDL Cholesterol 9 9 12   LDL Cholesterol  70 59 53   LDL/HDL Ratio 0.88 0.81 0.70   (A) Abnormal value            No " results found for: POCTROP    Results for orders placed in visit on 06/14/21    Adult Transthoracic Echo Complete w/ Color, Spectral and Contrast if necessary per protocol    Interpretation Summary  · Calculated left ventricular 3D EF = 61% Left ventricular ejection fraction appears to be 56 - 60%.       ECG 12 Lead    Date/Time: 8/3/2022 9:49 AM  Performed by: Haris Clements MD  Authorized by: Haris Clements MD   Comparison: compared with previous ECG   Similar to previous ECG  Conduction: left anterior fascicular block                   Diagnoses and all orders for this visit:    1. CAD S/P percutaneous coronary angioplasty (Primary)  Assessment & Plan:  Patient is doing well no ongoing angina continue with chronic aspirin 81 mg daily        2. Hyperlipidemia LDL goal <70  Assessment & Plan:  Patient's LDL goal on high-dose Crestor 20 a day and Repatha      3. Primary hypertension  Assessment & Plan:  Blood pressure goal range continue on lisinopril 20 a day encourage exercise and low-sodium diet      Other orders  -     ECG 12 Lead          Follow Up     Return in about 6 months (around 2/3/2023) for Follow with Sarah White.          Patient was given instructions and counseling regarding his condition or for health maintenance advice. Please see specific information pulled into the AVS if appropriate.

## 2022-10-19 RX ORDER — ROSUVASTATIN CALCIUM 20 MG/1
TABLET, COATED ORAL
Qty: 90 TABLET | Refills: 1 | Status: SHIPPED | OUTPATIENT
Start: 2022-10-19

## 2022-10-27 ENCOUNTER — CLINICAL SUPPORT (OUTPATIENT)
Dept: FAMILY MEDICINE CLINIC | Facility: CLINIC | Age: 62
End: 2022-10-27

## 2022-10-27 DIAGNOSIS — Z23 NEED FOR INFLUENZA VACCINATION: Primary | ICD-10-CM

## 2022-10-27 PROCEDURE — 90471 IMMUNIZATION ADMIN: CPT | Performed by: FAMILY MEDICINE

## 2022-10-27 PROCEDURE — 90686 IIV4 VACC NO PRSV 0.5 ML IM: CPT | Performed by: FAMILY MEDICINE

## 2022-11-23 RX ORDER — LISINOPRIL 20 MG/1
TABLET ORAL
Qty: 90 TABLET | Refills: 3 | Status: SHIPPED | OUTPATIENT
Start: 2022-11-23

## 2022-12-15 ENCOUNTER — OFFICE VISIT (OUTPATIENT)
Dept: FAMILY MEDICINE CLINIC | Facility: CLINIC | Age: 62
End: 2022-12-15

## 2022-12-15 VITALS
WEIGHT: 203 LBS | DIASTOLIC BLOOD PRESSURE: 80 MMHG | HEART RATE: 85 BPM | SYSTOLIC BLOOD PRESSURE: 122 MMHG | OXYGEN SATURATION: 97 % | BODY MASS INDEX: 29.06 KG/M2 | HEIGHT: 70 IN | TEMPERATURE: 97.9 F

## 2022-12-15 DIAGNOSIS — Z98.61 CAD S/P PERCUTANEOUS CORONARY ANGIOPLASTY: ICD-10-CM

## 2022-12-15 DIAGNOSIS — M25.511 CHRONIC RIGHT SHOULDER PAIN: ICD-10-CM

## 2022-12-15 DIAGNOSIS — I10 PRIMARY HYPERTENSION: Primary | ICD-10-CM

## 2022-12-15 DIAGNOSIS — R73.03 PREDIABETES: ICD-10-CM

## 2022-12-15 DIAGNOSIS — E78.5 HYPERLIPIDEMIA LDL GOAL <70: ICD-10-CM

## 2022-12-15 DIAGNOSIS — Z12.5 SCREENING FOR PROSTATE CANCER: ICD-10-CM

## 2022-12-15 DIAGNOSIS — L50.2 URTICARIA DUE TO COLD: ICD-10-CM

## 2022-12-15 DIAGNOSIS — G89.29 CHRONIC RIGHT SHOULDER PAIN: ICD-10-CM

## 2022-12-15 DIAGNOSIS — E53.8 B12 DEFICIENCY: ICD-10-CM

## 2022-12-15 DIAGNOSIS — I25.10 CAD S/P PERCUTANEOUS CORONARY ANGIOPLASTY: ICD-10-CM

## 2022-12-15 DIAGNOSIS — S46.001A INJURY OF RIGHT ROTATOR CUFF, INITIAL ENCOUNTER: ICD-10-CM

## 2022-12-15 DIAGNOSIS — Z51.81 MEDICATION MONITORING ENCOUNTER: ICD-10-CM

## 2022-12-15 PROCEDURE — 99214 OFFICE O/P EST MOD 30 MIN: CPT | Performed by: FAMILY MEDICINE

## 2022-12-15 NOTE — PROGRESS NOTES
"Chief Complaint  Hypertension  Right shoulder pain  HLD  Prediabetes    Subjective        Carlos Manuel Sanchez presents to [facility]  History of Present Illness  Patient presents today to follow-up for hypertension, hyperlipidemia and prediabetes.  He had labs done in July.  His A1c is at 6.2% which is relatively stable.  There is a slight increase from the previous 6.1%.  Lipid panel shows the LDL at 59.  B12 levels are within normal limits.  He is on a B complex.  His CMP shows normal renal function with normal sodium level.  His LFTs are normal.  CBC shows no anemia with normal white blood cell count and platelets.  He will be due for labs again in July.  For hypertension he is currently taking lisinopril 20 mg daily.  He is also taking Crestor 20 mg daily for hyperlipidemia.  He does have cold induced urticaria and uses Claritin 10 mg daily and sometimes will increase this to twice daily should he have a flareup of his symptoms.  His main issue today is to discuss right shoulder pain.  He reports that while doing bow hunting back in September he started feeling a burning pain in his right shoulder area when he would pull back on the boat.  He reports that he has to apply about 70 to 80 pounds of pressure which causes pain form.  He reports that every time he has tried now to use a bowel the symptoms occur.  He does chop wood outside and will have an aggravation of his shoulder as well doing this.  He is having some weakness in his right shoulder as well.  He presents today to have further evaluation.  Objective   Vital Signs:  /80 (BP Location: Right arm, Patient Position: Sitting)   Pulse 85   Temp 97.9 °F (36.6 °C) (Oral)   Ht 177.8 cm (70\")   Wt 92.1 kg (203 lb)   SpO2 97%   BMI 29.13 kg/m²   Estimated body mass index is 29.13 kg/m² as calculated from the following:    Height as of this encounter: 177.8 cm (70\").    Weight as of this encounter: 92.1 kg (203 " lb).          Physical Exam   General: AAO ×3, no acute distress, pleasant  HEENT: Normocephalic, atraumatic  Musculoskeletal: Right shoulder demonstrates positive empty can test with negative Suman liftoff testing negative external rotation testing.  There is guarding going through the range of motion and he is resisted in range of motion secondary to pain.  There is weakness noted with the empty can test.  Cardiovascular: Regular rate and rhythm without appreciable murmur  Respiratory: Clear to auscultation bilaterally no RRW  Gastrointestinal: Soft nontender nondistended with bowel sounds present  extremities: No edema  Neurologic: CN II through XII grossly intact   Psychiatric: Normal mood and affect  Result Review :                Assessment and Plan   Diagnoses and all orders for this visit:    1. Primary hypertension (Primary)  -     CBC & Differential; Future  -     Comprehensive Metabolic Panel; Future    2. Medication monitoring encounter  -     CBC & Differential; Future  -     Comprehensive Metabolic Panel; Future  -     PSA Screen; Future  -     Lipid Panel; Future  -     Hemoglobin A1c; Future  -     Vitamin B12; Future    3. Hyperlipidemia LDL goal <70  -     Lipid Panel; Future    4. Prediabetes  -     Hemoglobin A1c; Future    5. CAD S/P percutaneous coronary angioplasty    6. Chronic right shoulder pain  -     MRI Shoulder Right Without Contrast; Future    7. Injury of right rotator cuff, initial encounter  -     MRI Shoulder Right Without Contrast; Future    8. Screening for prostate cancer  -     PSA Screen; Future    9. B12 deficiency  -     Vitamin B12; Future    10. Urticaria due to cold    I am concerned that patient has a rotator cuff tear.  He has previously had surgery for the left rotator cuff back in 2020 with Dr. James and is describing similar symptoms at this time.  We discussed options for him.  He has been doing home exercises from physical therapy for his left shoulder previously.   He is have not been beneficial.  I discussed pursuing an MRI for further evaluation.  We discussed continuing current management for hypertension, hyperlipidemia as well as focusing on reducing intake of carbohydrates/sugars to help manage prediabetes.  He will be due for screening PSA with his next lab work.         Follow Up   Return in about 7 months (around 7/15/2023) for hypertension.  Patient was given instructions and counseling regarding his condition or for health maintenance advice. Please see specific information pulled into the AVS if appropriate.

## 2023-01-11 ENCOUNTER — HOSPITAL ENCOUNTER (OUTPATIENT)
Dept: MRI IMAGING | Facility: HOSPITAL | Age: 63
Discharge: HOME | End: 2023-01-11
Admitting: FAMILY MEDICINE
Payer: OTHER GOVERNMENT

## 2023-01-11 DIAGNOSIS — G89.29 CHRONIC RIGHT SHOULDER PAIN: Primary | ICD-10-CM

## 2023-01-11 DIAGNOSIS — M75.121 COMPLETE TEAR OF RIGHT ROTATOR CUFF, UNSPECIFIED WHETHER TRAUMATIC: ICD-10-CM

## 2023-01-11 DIAGNOSIS — G89.29 CHRONIC RIGHT SHOULDER PAIN: ICD-10-CM

## 2023-01-11 DIAGNOSIS — M25.511 CHRONIC RIGHT SHOULDER PAIN: ICD-10-CM

## 2023-01-11 DIAGNOSIS — M25.511 CHRONIC RIGHT SHOULDER PAIN: Primary | ICD-10-CM

## 2023-01-11 DIAGNOSIS — S46.001A INJURY OF RIGHT ROTATOR CUFF, INITIAL ENCOUNTER: ICD-10-CM

## 2023-01-11 PROCEDURE — 73221 MRI JOINT UPR EXTREM W/O DYE: CPT

## 2023-01-19 ENCOUNTER — OFFICE VISIT (OUTPATIENT)
Dept: ORTHOPEDIC SURGERY | Facility: CLINIC | Age: 63
End: 2023-01-19
Payer: OTHER GOVERNMENT

## 2023-01-19 ENCOUNTER — TELEPHONE (OUTPATIENT)
Dept: CARDIOLOGY | Facility: CLINIC | Age: 63
End: 2023-01-19
Payer: OTHER GOVERNMENT

## 2023-01-19 ENCOUNTER — PREP FOR SURGERY (OUTPATIENT)
Dept: OTHER | Facility: HOSPITAL | Age: 63
End: 2023-01-19
Payer: OTHER GOVERNMENT

## 2023-01-19 VITALS — HEART RATE: 74 BPM | WEIGHT: 203 LBS | HEIGHT: 70 IN | OXYGEN SATURATION: 98 % | BODY MASS INDEX: 29.06 KG/M2

## 2023-01-19 DIAGNOSIS — M75.101 TEAR OF RIGHT ROTATOR CUFF, UNSPECIFIED TEAR EXTENT, UNSPECIFIED WHETHER TRAUMATIC: Primary | ICD-10-CM

## 2023-01-19 PROCEDURE — 99203 OFFICE O/P NEW LOW 30 MIN: CPT | Performed by: ORTHOPAEDIC SURGERY

## 2023-01-19 RX ORDER — CEFAZOLIN SODIUM IN 0.9 % NACL 3 G/100 ML
3 INTRAVENOUS SOLUTION, PIGGYBACK (ML) INTRAVENOUS ONCE
Status: CANCELLED | OUTPATIENT
Start: 2023-01-19 | End: 2023-01-19

## 2023-01-19 RX ORDER — CEFAZOLIN SODIUM 2 G/100ML
2 INJECTION, SOLUTION INTRAVENOUS ONCE
Status: CANCELLED | OUTPATIENT
Start: 2023-01-19 | End: 2023-01-19

## 2023-01-19 NOTE — TELEPHONE ENCOUNTER
Procedure: R Shoulder Arthroscopy with Rotator Cuff Repair    Med Directive: Aspirin    PMH: CAD previous stent 2000, HTN, HLD    Last Seen: 8/3/22

## 2023-01-19 NOTE — H&P (VIEW-ONLY)
"Chief Complaint  Initial Evaluation of the Right Shoulder     Subjective      Carlos Manuel Sanchez presents to [facility] for initial evaluation of the right shoulder.  He states it feels like his left shoulder before he had surgery.  He notes burning and pain from the shoulder to the elbow.  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     No Known Allergies     Social History     Socioeconomic History   • Marital status:    Tobacco Use   • Smoking status: Never   • Smokeless tobacco: Never   Vaping Use   • Vaping Use: Never used   Substance and Sexual Activity   • Alcohol use: Yes     Comment: OCCASIONALLY 2/24/22 2 beers   • Drug use: Never   • Sexual activity: Defer        Review of Systems     Objective   Vital Signs:   Pulse 74   Ht 177.8 cm (70\")   Wt 92.1 kg (203 lb)   SpO2 98%   BMI 29.13 kg/m²       Physical Exam  Constitutional:       Appearance: Normal appearance. Patient is well-developed and normal weight.   HENT:      Head: Normocephalic.      Right Ear: Hearing and external ear normal.      Left Ear: Hearing and external ear normal.      Nose: Nose normal.   Eyes:      Conjunctiva/sclera: Conjunctivae normal.   Cardiovascular:      Rate and Rhythm: Normal rate.   Pulmonary:      Effort: Pulmonary effort is normal.      Breath sounds: No wheezing or rales.   Abdominal:      Palpations: Abdomen is soft.      Tenderness: There is no abdominal tenderness.   Musculoskeletal:      Cervical back: Normal range of motion.   Skin:     Findings: No rash.   Neurological:      Mental Status: Patient is alert and oriented to person, place, and time.   Psychiatric:         Mood and Affect: Mood and affect normal.         Judgment: Judgment normal.       Ortho Exam      RIGHT SHOULDER Forward flexion 160. Abduction 110. External rotation 50. Internal rotation to back pocket. Positive Cross body adduction. Supraspinatus strength 3+/5. Infraspinatus Strength 3+/5. " Infrared subscap 3+/5. Positive Greenberg. Mildly Positive  Neer. Negative Apprehension.  (Negative Obriens. Sensation intact to light touch, median, radial, ulnar nerve. Positive AIN, PIN, ulnar nerve motor. Positive pulses. Positive Impingement signs. Good strength in triceps, biceps, deltoid, wrist extensors and wrist flexors. Positive Empty Can test and Drop arm test.       Procedures      Imaging Results (Most Recent)     None           Result Review :         MRI Shoulder Right Without Contrast    Result Date: 1/11/2023  Narrative: PROCEDURE: MRI SHOULDER RIGHT WO CONTRAST  COMPARISON: [facility], MR, MRI LEFT SHOULDER WO CONTRAST, 2/26/2020, 7:45.  INDICATIONS: RIGHT SHOULDER PAIN FOR SIX MONTHS      TECHNIQUE: A variety of imaging planes and parameters were utilized for visualization of suspected pathology.  Images were performed without contrast.   FINDINGS:  No fracture or malalignment is seen.  Marrow signal appears normal.  Mild acromioclavicular osteoarthritis is noted.  A small AC joint effusion is noted.  A type 2 acromion is present.  There is a full-thickness tear of the distal supraspinatus tendon at the footprint with 0.8 cm tendon retraction.  The tear measures approximately 1.1 cm AP.  Mild supraspinatus and infra spinatus tendinopathy is noted.  Mild subscapularis tendinopathy is noted.  No significant atrophy of the rotator cuff musculature is seen.  There is a tear of the superior labrum at the biceps anchor.  The mid and anteroinferior labrum demonstrate intermediate signal abnormality consistent with degenerative changes plus or minus superimposed degenerative tearing.    No glenohumeral joint effusion or loose body is identified.  Cartilage in the joint is intact.       Impression:   1. Small full-thickness tear of the distal supraspinatus tendon, as above 2. Mild supraspinatus , subscapularis and infra spinatus tendinopathy 3. Small tear of the superior labrum at the biceps  anchor. 4. Mild acromioclavicular osteoarthritis      Fadi Scott M.D.       Electronically Signed and Approved By: Fadi Scott M.D. on 1/11/2023 at 14:33                      Assessment and Plan     Diagnoses and all orders for this visit:    1. Tear of right rotator cuff, unspecified tear extent, unspecified whether traumatic (Primary)        I reviewed the MRI results with the patient. Discussed the treatment options with the patient, operative of a shoulder arthroscopy vs non-operative conservative treatment as injections, medication and physical therapy. The patient expressed understanding and wished to proceed a right shoulder arthroscopy.     Call or return if worsening symptoms.    Follow Up     Postoperatively       Patient was given instructions and counseling regarding his condition or for health maintenance advice. Please see specific information pulled into the AVS if appropriate.     Scribed for Lisa James MD by Zara Milton MA.  01/19/23   08:59 EST    I have personally performed the services described in this document as scribed by the above individual and it is both accurate and complete. Lisa James MD 01/19/23

## 2023-01-19 NOTE — PROGRESS NOTES
"Chief Complaint  Initial Evaluation of the Right Shoulder     Subjective      Carlos Manuel Sanchez presents to [facility] for initial evaluation of the right shoulder.  He states it feels like his left shoulder before he had surgery.  He notes burning and pain from the shoulder to the elbow.  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     No Known Allergies     Social History     Socioeconomic History   • Marital status:    Tobacco Use   • Smoking status: Never   • Smokeless tobacco: Never   Vaping Use   • Vaping Use: Never used   Substance and Sexual Activity   • Alcohol use: Yes     Comment: OCCASIONALLY 2/24/22 2 beers   • Drug use: Never   • Sexual activity: Defer        Review of Systems     Objective   Vital Signs:   Pulse 74   Ht 177.8 cm (70\")   Wt 92.1 kg (203 lb)   SpO2 98%   BMI 29.13 kg/m²       Physical Exam  Constitutional:       Appearance: Normal appearance. Patient is well-developed and normal weight.   HENT:      Head: Normocephalic.      Right Ear: Hearing and external ear normal.      Left Ear: Hearing and external ear normal.      Nose: Nose normal.   Eyes:      Conjunctiva/sclera: Conjunctivae normal.   Cardiovascular:      Rate and Rhythm: Normal rate.   Pulmonary:      Effort: Pulmonary effort is normal.      Breath sounds: No wheezing or rales.   Abdominal:      Palpations: Abdomen is soft.      Tenderness: There is no abdominal tenderness.   Musculoskeletal:      Cervical back: Normal range of motion.   Skin:     Findings: No rash.   Neurological:      Mental Status: Patient is alert and oriented to person, place, and time.   Psychiatric:         Mood and Affect: Mood and affect normal.         Judgment: Judgment normal.       Ortho Exam      RIGHT SHOULDER Forward flexion 160. Abduction 110. External rotation 50. Internal rotation to back pocket. Positive Cross body adduction. Supraspinatus strength 3+/5. Infraspinatus Strength 3+/5. " Infrared subscap 3+/5. Positive Greenberg. Mildly Positive  Neer. Negative Apprehension.  (Negative Obriens. Sensation intact to light touch, median, radial, ulnar nerve. Positive AIN, PIN, ulnar nerve motor. Positive pulses. Positive Impingement signs. Good strength in triceps, biceps, deltoid, wrist extensors and wrist flexors. Positive Empty Can test and Drop arm test.       Procedures      Imaging Results (Most Recent)     None           Result Review :         MRI Shoulder Right Without Contrast    Result Date: 1/11/2023  Narrative: PROCEDURE: MRI SHOULDER RIGHT WO CONTRAST  COMPARISON: [facility], MR, MRI LEFT SHOULDER WO CONTRAST, 2/26/2020, 7:45.  INDICATIONS: RIGHT SHOULDER PAIN FOR SIX MONTHS      TECHNIQUE: A variety of imaging planes and parameters were utilized for visualization of suspected pathology.  Images were performed without contrast.   FINDINGS:  No fracture or malalignment is seen.  Marrow signal appears normal.  Mild acromioclavicular osteoarthritis is noted.  A small AC joint effusion is noted.  A type 2 acromion is present.  There is a full-thickness tear of the distal supraspinatus tendon at the footprint with 0.8 cm tendon retraction.  The tear measures approximately 1.1 cm AP.  Mild supraspinatus and infra spinatus tendinopathy is noted.  Mild subscapularis tendinopathy is noted.  No significant atrophy of the rotator cuff musculature is seen.  There is a tear of the superior labrum at the biceps anchor.  The mid and anteroinferior labrum demonstrate intermediate signal abnormality consistent with degenerative changes plus or minus superimposed degenerative tearing.    No glenohumeral joint effusion or loose body is identified.  Cartilage in the joint is intact.       Impression:   1. Small full-thickness tear of the distal supraspinatus tendon, as above 2. Mild supraspinatus , subscapularis and infra spinatus tendinopathy 3. Small tear of the superior labrum at the biceps  anchor. 4. Mild acromioclavicular osteoarthritis      Fadi Scott M.D.       Electronically Signed and Approved By: Fadi Scott M.D. on 1/11/2023 at 14:33                      Assessment and Plan     Diagnoses and all orders for this visit:    1. Tear of right rotator cuff, unspecified tear extent, unspecified whether traumatic (Primary)        I reviewed the MRI results with the patient. Discussed the treatment options with the patient, operative of a shoulder arthroscopy vs non-operative conservative treatment as injections, medication and physical therapy. The patient expressed understanding and wished to proceed a right shoulder arthroscopy.     Call or return if worsening symptoms.    Follow Up     Postoperatively       Patient was given instructions and counseling regarding his condition or for health maintenance advice. Please see specific information pulled into the AVS if appropriate.     Scribed for Lisa James MD by Zara Milton MA.  01/19/23   08:59 EST    I have personally performed the services described in this document as scribed by the above individual and it is both accurate and complete. Lisa James MD 01/19/23

## 2023-01-23 ENCOUNTER — TELEPHONE (OUTPATIENT)
Dept: ORTHOPEDIC SURGERY | Facility: CLINIC | Age: 63
End: 2023-01-23
Payer: OTHER GOVERNMENT

## 2023-01-23 NOTE — TELEPHONE ENCOUNTER
CALLED AND LEFT A MESSAGE ON MACHINE.  PER DR WARD PATIENT CAN HOLD.STOP ASPIRIN FOR 7 DAYS PRIOR TO HIS SURGERY.

## 2023-01-25 ENCOUNTER — HOSPITAL ENCOUNTER (OUTPATIENT)
Facility: HOSPITAL | Age: 63
Discharge: HOME | End: 2023-01-25
Attending: ORTHOPAEDIC SURGERY | Admitting: ORTHOPAEDIC SURGERY
Payer: OTHER GOVERNMENT

## 2023-01-25 ENCOUNTER — ANESTHESIA (OUTPATIENT)
Dept: PERIOP | Facility: HOSPITAL | Age: 63
End: 2023-01-25
Payer: OTHER GOVERNMENT

## 2023-01-25 ENCOUNTER — ANESTHESIA EVENT (OUTPATIENT)
Dept: PERIOP | Facility: HOSPITAL | Age: 63
End: 2023-01-25
Payer: OTHER GOVERNMENT

## 2023-01-25 VITALS
SYSTOLIC BLOOD PRESSURE: 127 MMHG | TEMPERATURE: 97.5 F | BODY MASS INDEX: 28.85 KG/M2 | OXYGEN SATURATION: 92 % | HEIGHT: 70 IN | RESPIRATION RATE: 13 BRPM | HEART RATE: 67 BPM | DIASTOLIC BLOOD PRESSURE: 81 MMHG | WEIGHT: 201.5 LBS

## 2023-01-25 DIAGNOSIS — M75.101 TEAR OF RIGHT ROTATOR CUFF, UNSPECIFIED TEAR EXTENT, UNSPECIFIED WHETHER TRAUMATIC: ICD-10-CM

## 2023-01-25 PROCEDURE — 23412 REPAIR ROTATOR CUFF CHRONIC: CPT | Performed by: ORTHOPAEDIC SURGERY

## 2023-01-25 PROCEDURE — C1713 ANCHOR/SCREW BN/BN,TIS/BN: HCPCS | Performed by: ORTHOPAEDIC SURGERY

## 2023-01-25 PROCEDURE — 29824 SHO ARTHRS SRG DSTL CLAVICLC: CPT | Performed by: ORTHOPAEDIC SURGERY

## 2023-01-25 PROCEDURE — 25010000002 DEXAMETHASONE PER 1 MG: Performed by: NURSE ANESTHETIST, CERTIFIED REGISTERED

## 2023-01-25 PROCEDURE — L3670 SO ACRO/CLAV CAN WEB PRE OTS: HCPCS | Performed by: ORTHOPAEDIC SURGERY

## 2023-01-25 PROCEDURE — 25010000002 KETOROLAC TROMETHAMINE PER 15 MG: Performed by: NURSE ANESTHETIST, CERTIFIED REGISTERED

## 2023-01-25 PROCEDURE — 25010000002 CEFAZOLIN IN DEXTROSE 2-4 GM/100ML-% SOLUTION: Performed by: ORTHOPAEDIC SURGERY

## 2023-01-25 PROCEDURE — 25010000002 PROPOFOL 10 MG/ML EMULSION: Performed by: NURSE ANESTHETIST, CERTIFIED REGISTERED

## 2023-01-25 PROCEDURE — 25010000002 FENTANYL CITRATE (PF) 50 MCG/ML SOLUTION: Performed by: NURSE ANESTHETIST, CERTIFIED REGISTERED

## 2023-01-25 PROCEDURE — 25010000002 ONDANSETRON PER 1 MG: Performed by: NURSE ANESTHETIST, CERTIFIED REGISTERED

## 2023-01-25 DEVICE — IMPLANTABLE DEVICE
Type: IMPLANTABLE DEVICE | Site: SHOULDER | Status: FUNCTIONAL
Brand: QUATTRO® X3 TRIPLE LOADED SUTURE ANCHOR

## 2023-01-25 DEVICE — IMPLANTABLE DEVICE
Type: IMPLANTABLE DEVICE | Site: SHOULDER | Status: FUNCTIONAL
Brand: QUATTRO® LINK KNOTLESS ANCHOR

## 2023-01-25 RX ORDER — MAGNESIUM HYDROXIDE 1200 MG/15ML
LIQUID ORAL AS NEEDED
Status: DISCONTINUED | OUTPATIENT
Start: 2023-01-25 | End: 2023-01-25 | Stop reason: HOSPADM

## 2023-01-25 RX ORDER — MEPERIDINE HYDROCHLORIDE 25 MG/ML
12.5 INJECTION INTRAMUSCULAR; INTRAVENOUS; SUBCUTANEOUS
Status: DISCONTINUED | OUTPATIENT
Start: 2023-01-25 | End: 2023-01-25 | Stop reason: HOSPADM

## 2023-01-25 RX ORDER — MIDAZOLAM HYDROCHLORIDE 1 MG/ML
2 INJECTION INTRAMUSCULAR; INTRAVENOUS ONCE
Status: DISCONTINUED | OUTPATIENT
Start: 2023-01-25 | End: 2023-01-25 | Stop reason: HOSPADM

## 2023-01-25 RX ORDER — DEXAMETHASONE SODIUM PHOSPHATE 4 MG/ML
INJECTION, SOLUTION INTRA-ARTICULAR; INTRALESIONAL; INTRAMUSCULAR; INTRAVENOUS; SOFT TISSUE AS NEEDED
Status: DISCONTINUED | OUTPATIENT
Start: 2023-01-25 | End: 2023-01-25 | Stop reason: SURG

## 2023-01-25 RX ORDER — ONDANSETRON 2 MG/ML
4 INJECTION INTRAMUSCULAR; INTRAVENOUS ONCE AS NEEDED
Status: DISCONTINUED | OUTPATIENT
Start: 2023-01-25 | End: 2023-01-25 | Stop reason: HOSPADM

## 2023-01-25 RX ORDER — LIDOCAINE HYDROCHLORIDE 20 MG/ML
INJECTION, SOLUTION EPIDURAL; INFILTRATION; INTRACAUDAL; PERINEURAL AS NEEDED
Status: DISCONTINUED | OUTPATIENT
Start: 2023-01-25 | End: 2023-01-25 | Stop reason: SURG

## 2023-01-25 RX ORDER — FENTANYL CITRATE 50 UG/ML
INJECTION, SOLUTION INTRAMUSCULAR; INTRAVENOUS AS NEEDED
Status: DISCONTINUED | OUTPATIENT
Start: 2023-01-25 | End: 2023-01-25 | Stop reason: SURG

## 2023-01-25 RX ORDER — BUPIVACAINE HYDROCHLORIDE AND EPINEPHRINE 5; 5 MG/ML; UG/ML
INJECTION, SOLUTION EPIDURAL; INTRACAUDAL; PERINEURAL
Status: COMPLETED | OUTPATIENT
Start: 2023-01-25 | End: 2023-01-25

## 2023-01-25 RX ORDER — PROMETHAZINE HYDROCHLORIDE 25 MG/1
25 SUPPOSITORY RECTAL ONCE AS NEEDED
Status: DISCONTINUED | OUTPATIENT
Start: 2023-01-25 | End: 2023-01-25 | Stop reason: HOSPADM

## 2023-01-25 RX ORDER — OXYCODONE HYDROCHLORIDE 5 MG/1
5 TABLET ORAL
Status: DISCONTINUED | OUTPATIENT
Start: 2023-01-25 | End: 2023-01-25 | Stop reason: HOSPADM

## 2023-01-25 RX ORDER — HYDROCODONE BITARTRATE AND ACETAMINOPHEN 7.5; 325 MG/1; MG/1
1-2 TABLET ORAL EVERY 4 HOURS PRN
Qty: 40 TABLET | Refills: 0 | Status: SHIPPED | OUTPATIENT
Start: 2023-01-25 | End: 2023-02-09 | Stop reason: SDUPTHER

## 2023-01-25 RX ORDER — CEFAZOLIN SODIUM IN 0.9 % NACL 3 G/100 ML
3 INTRAVENOUS SOLUTION, PIGGYBACK (ML) INTRAVENOUS ONCE
Status: DISCONTINUED | OUTPATIENT
Start: 2023-01-25 | End: 2023-01-25

## 2023-01-25 RX ORDER — ACETAMINOPHEN 500 MG
1000 TABLET ORAL ONCE
Status: COMPLETED | OUTPATIENT
Start: 2023-01-25 | End: 2023-01-25

## 2023-01-25 RX ORDER — ROCURONIUM BROMIDE 10 MG/ML
INJECTION, SOLUTION INTRAVENOUS AS NEEDED
Status: DISCONTINUED | OUTPATIENT
Start: 2023-01-25 | End: 2023-01-25 | Stop reason: SURG

## 2023-01-25 RX ORDER — ONDANSETRON 2 MG/ML
INJECTION INTRAMUSCULAR; INTRAVENOUS AS NEEDED
Status: DISCONTINUED | OUTPATIENT
Start: 2023-01-25 | End: 2023-01-25 | Stop reason: SURG

## 2023-01-25 RX ORDER — GLYCOPYRROLATE 0.2 MG/ML
0.2 INJECTION INTRAMUSCULAR; INTRAVENOUS
Status: COMPLETED | OUTPATIENT
Start: 2023-01-25 | End: 2023-01-25

## 2023-01-25 RX ORDER — PROPOFOL 10 MG/ML
VIAL (ML) INTRAVENOUS AS NEEDED
Status: DISCONTINUED | OUTPATIENT
Start: 2023-01-25 | End: 2023-01-25 | Stop reason: SURG

## 2023-01-25 RX ORDER — KETOROLAC TROMETHAMINE 30 MG/ML
INJECTION, SOLUTION INTRAMUSCULAR; INTRAVENOUS AS NEEDED
Status: DISCONTINUED | OUTPATIENT
Start: 2023-01-25 | End: 2023-01-25 | Stop reason: SURG

## 2023-01-25 RX ORDER — PROMETHAZINE HYDROCHLORIDE 12.5 MG/1
25 TABLET ORAL ONCE AS NEEDED
Status: DISCONTINUED | OUTPATIENT
Start: 2023-01-25 | End: 2023-01-25 | Stop reason: HOSPADM

## 2023-01-25 RX ORDER — SODIUM CHLORIDE, SODIUM LACTATE, POTASSIUM CHLORIDE, CALCIUM CHLORIDE 600; 310; 30; 20 MG/100ML; MG/100ML; MG/100ML; MG/100ML
9 INJECTION, SOLUTION INTRAVENOUS CONTINUOUS PRN
Status: DISCONTINUED | OUTPATIENT
Start: 2023-01-25 | End: 2023-01-25 | Stop reason: HOSPADM

## 2023-01-25 RX ORDER — CEFAZOLIN SODIUM 2 G/100ML
2 INJECTION, SOLUTION INTRAVENOUS ONCE
Status: COMPLETED | OUTPATIENT
Start: 2023-01-25 | End: 2023-01-25

## 2023-01-25 RX ORDER — HYDROCODONE BITARTRATE AND ACETAMINOPHEN 7.5; 325 MG/1; MG/1
1 TABLET ORAL ONCE AS NEEDED
Status: DISCONTINUED | OUTPATIENT
Start: 2023-01-25 | End: 2023-01-25 | Stop reason: HOSPADM

## 2023-01-25 RX ADMIN — DEXAMETHASONE SODIUM PHOSPHATE 4 MG: 4 INJECTION, SOLUTION INTRA-ARTICULAR; INTRALESIONAL; INTRAMUSCULAR; INTRAVENOUS; SOFT TISSUE at 12:03

## 2023-01-25 RX ADMIN — SODIUM CHLORIDE, POTASSIUM CHLORIDE, SODIUM LACTATE AND CALCIUM CHLORIDE 9 ML/HR: 600; 310; 30; 20 INJECTION, SOLUTION INTRAVENOUS at 10:19

## 2023-01-25 RX ADMIN — KETOROLAC TROMETHAMINE 30 MG: 30 INJECTION, SOLUTION INTRAMUSCULAR; INTRAVENOUS at 12:23

## 2023-01-25 RX ADMIN — ACETAMINOPHEN 1000 MG: 500 TABLET ORAL at 10:19

## 2023-01-25 RX ADMIN — OXYCODONE HYDROCHLORIDE 5 MG: 5 TABLET ORAL at 13:33

## 2023-01-25 RX ADMIN — SUGAMMADEX 200 MG: 100 INJECTION, SOLUTION INTRAVENOUS at 12:22

## 2023-01-25 RX ADMIN — ROCURONIUM BROMIDE 20 MG: 10 INJECTION, SOLUTION INTRAVENOUS at 12:01

## 2023-01-25 RX ADMIN — CEFAZOLIN SODIUM 2 G: 2 INJECTION, SOLUTION INTRAVENOUS at 11:36

## 2023-01-25 RX ADMIN — GLYCOPYRROLATE 0.2 MG: 0.2 INJECTION INTRAMUSCULAR; INTRAVENOUS at 11:17

## 2023-01-25 RX ADMIN — PROPOFOL 200 MG: 10 INJECTION, EMULSION INTRAVENOUS at 11:40

## 2023-01-25 RX ADMIN — ROCURONIUM BROMIDE 50 MG: 10 INJECTION, SOLUTION INTRAVENOUS at 11:40

## 2023-01-25 RX ADMIN — FENTANYL CITRATE 50 MCG: 50 INJECTION, SOLUTION INTRAMUSCULAR; INTRAVENOUS at 11:40

## 2023-01-25 RX ADMIN — BUPIVACAINE HYDROCHLORIDE AND EPINEPHRINE BITARTRATE 15 ML: 5; .005 INJECTION, SOLUTION EPIDURAL; INTRACAUDAL; PERINEURAL at 11:14

## 2023-01-25 RX ADMIN — ONDANSETRON 4 MG: 2 INJECTION INTRAMUSCULAR; INTRAVENOUS at 12:03

## 2023-01-25 RX ADMIN — FENTANYL CITRATE 50 MCG: 50 INJECTION, SOLUTION INTRAMUSCULAR; INTRAVENOUS at 11:41

## 2023-01-25 RX ADMIN — LIDOCAINE HYDROCHLORIDE 50 MG: 20 INJECTION, SOLUTION EPIDURAL; INFILTRATION; INTRACAUDAL; PERINEURAL at 11:40

## 2023-01-25 NOTE — ANESTHESIA PROCEDURE NOTES
Peripheral Block    Pre-sedation assessment completed: 1/25/2023 11:10 AM    Patient reassessed immediately prior to procedure    Patient location during procedure: pre-op  Start time: 1/25/2023 11:10 AM  Stop time: 1/25/2023 11:14 AM  Reason for block: at surgeon's request and post-op pain management  Performed by  Anesthesiologist: Sven Trejo MD  Preanesthetic Checklist  Completed: patient identified, IV checked, site marked, risks and benefits discussed, surgical consent, monitors and equipment checked, pre-op evaluation and timeout performed  Prep:  Pt Position: supine (HOB elevated)  Sterile barriers:cap, washed/disinfected hands, sterile barriers, gloves, mask, partial drape and alcohol skin prep  Prep: ChloraPrep  Patient monitoring: blood pressure monitoring, continuous pulse oximetry and EKG  Procedure    Sedation: yes  Performed under: local infiltration  Guidance:ultrasound guided and nerve stimulator    ULTRASOUND INTERPRETATION.  Using ultrasound guidance a 22 G gauge needle was placed in close proximity to the brachial plexus nerve, at which point, under ultrasound guidance anesthetic was injected in the area of the nerve and spread of the anesthesia was seen on ultrasound in close proximity thereto.  There were no abnormalities seen on ultrasound; a digital image was taken; and the patient tolerated the procedure with no complications. Images:still images obtained, printed/placed on chart    Laterality:right  Block Type:interscalene  Injection Technique:single-shot  Needle Type:echogenic  Needle Gauge:22 G (2in)  Resistance on Injection: none    Medications Used: bupivacaine-EPINEPHrine PF (MARCAINE w/EPI) 0.5% -[id number] injection - Injection, Interscalene   15 mL - 1/25/2023 11:14:00 AM      Medications  Comment:Needle tip was visualized all the time during the procedure. Medication spread was visualzed under the ultrasound during injection of the medication. No intravascular/intraneural  injection noted.      Post Assessment  Injection Assessment: negative aspiration for heme, no paresthesia on injection and incremental injection  Patient Tolerance:comfortable throughout block  Complications:no  Additional Notes  The block or continuous infusion is requested by the referring physician for management of postoperative pain, or pain related to a procedure. Ultrasound guidance (deemed medically necessary). Painless injection, pt was awake and conversant during the procedure without complications. Needle and surrounding structures visualized throughout procedure. No adverse reactions or complications seen during this period. Post-procedure image showed no signs of complication, and anatomy was consistent with an uncomplicated nerve blockade.

## 2023-01-25 NOTE — ANESTHESIA PREPROCEDURE EVALUATION
Anesthesia Evaluation     Patient summary reviewed and Nursing notes reviewed   no history of anesthetic complications:  NPO Solid Status: > 8 hours  NPO Liquid Status: > 2 hours           Airway   Mallampati: II  TM distance: >3 FB  Neck ROM: full  No difficulty expected  Dental      Pulmonary - negative pulmonary ROS and normal exam    breath sounds clear to auscultation  Cardiovascular - normal exam  Exercise tolerance: good (4-7 METS)    Rhythm: regular  Rate: normal    (+) hypertension well controlled, CAD, cardiac stents more than 12 months ago hyperlipidemia,       Neuro/Psych- negative ROS  GI/Hepatic/Renal/Endo - negative ROS     Musculoskeletal (-) negative ROS    Abdominal    Substance History - negative use     OB/GYN negative ob/gyn ROS         Other - negative ROS       ROS/Med Hx Other: HX OF CAD W/ 1 STENT 2000. FOLLOWS WITH DR. WARD. CARDIAC CLEARED W/ MODERATE RISK. LAST ECHO 6/14/21 EF 61%, STRESS 05/24/21 EF 36% NO ISCHEMIA. METS>4. NO CP/SOA.  ELM/ DR. MONROY REVIEWED.                   Anesthesia Plan    ASA 3     general with block     (Patient understands anesthesia not responsible for dental damage.)  intravenous induction     Anesthetic plan, risks, benefits, and alternatives have been provided, discussed and informed consent has been obtained with: patient.  Pre-procedure education provided  Plan discussed with CRNA.        CODE STATUS:

## 2023-01-25 NOTE — OP NOTE
SHOULDER ARTHROSCOPY WITH ROTATOR CUFF REPAIR  Procedure Report    Patient Name:  Carlos Manuel Sanchez  YOB: 1960    Date of Surgery:  1/25/2023     Indications:  shoulder pain/injury, failed conservative care    Pre-op Diagnosis:   Tear of right rotator cuff, unspecified tear extent, unspecified whether traumatic [M75.101] with shoulder impingement and primary AC joint arthritis       Post-Op Diagnosis Codes:     * Tear of right rotator cuff, unspecified tear extent, unspecified whether traumatic [M75.101] with shoulder impingement primary AC joint arthritis    Procedure/CPT® Codes:      Procedure(s):  SHOULDER ARTHROSCOPY WITH MINI OPEN ROTATOR CUFF REPAIR, arthroscopic extensive SUBCROMIAL DECOMPRESSION, arthroscopic DISTAL CLAVICLE RESECTION    Staff:  Surgeon(s):  Lisa James MD    Assistant: Carol Rausch    Anesthesia: General    Estimated Blood Loss: minimal    Implants:    Implant Name Type Inv. Item Serial No.  Lot No. LRB No. Used Action   SUT/ANCH QUATTRO X3 PRELD W/3/SZ2/FORCEFIBER/SUT 5.5MM - [id number] Implant SUT/ANCH QUATTRO X3 PRELD W/3/SZ2/FORCEFIBER/SUT 5.5MM  [facility] [id number] Right 1 Implanted   SUT/ANCH QUATTRO LINK KNOTLSS 4.5 - [id number] Implant SUT/ANCH QUATTRO LINK KNOTLSS 4.5  [facility] [id number] Right 1 Implanted       Specimen:          None        Findings: + rotator cuff tear    Complications: none    Description of Procedure: The patient was brought to the operating room and had a preoperative antibiotic and preoperative block. The patient was placed in a modified beach chair position and was prepped and draped in sterile fashion. A posterior portal was made. The scope was inserted to the glenohumeral joint. There was no labral tear, no biceps tendon tear, and there was a full-thickness rotator cuff tear. Attention was then turned to the subacromial space where an extensive subacromial decompression was then performed using a hilda HARRIS  and a magdiel. This included an acromioplasty and bursectomy, inspection of the rotator cuff.  Attention was then turned to the distal clavicle where a distal claviculectomy was then performed using a VAPR and a magdiel. Following this, a mini open incision was made. The deltoid was split and retractors placed. The rotator cuff tear was identified and then this was repaired using a single medial row anchor and  lateral row anchor. A good repair was achieved. This was then thoroughly irrigated, closed using #1 Vicryl, 2-0 Vicryl and 3-0 Monocryl, and 3-0 nylon was used for the portals. Sterile dressing was applied followed by application of an ice pack and UltraSling. The patient was then extubated and taken to the recovery room in stable condition. No complications.    Assistant: Carol Rausch  was responsible for performing the following activities: Retraction, Suction, Irrigation, Closing and Placing Dressing and their skilled assistance was necessary for the success of this case.    Lisa James MD     Date: 1/25/2023  Time: 12:38 EST

## 2023-01-25 NOTE — ANESTHESIA POSTPROCEDURE EVALUATION
Patient: Carlos Manuel Sanchez    Procedure Summary     Date: 01/25/23 Room / Location: [facility] OR  / [facility]    Anesthesia Start: 1126 Anesthesia Stop: 1254    Procedure: SHOULDER ARTHROSCOPY WITH ROTATOR CUFF REPAIR, SUBCROMIAL DECOMPRESSION,DISTAL CLAVICLE RESECTION (Right: Shoulder) Diagnosis:       Tear of right rotator cuff, unspecified tear extent, unspecified whether traumatic      (Tear of right rotator cuff, unspecified tear extent, unspecified whether traumatic [M75.101])    Surgeons: Lisa James MD Provider: Sven Trejo MD    Anesthesia Type: general with block ASA Status: 3          Anesthesia Type: general with block    Vitals  Vitals Value Taken Time   /79 01/25/23 1345   Temp 36.3 °C (97.4 °F) 01/25/23 1247   Pulse 72 01/25/23 1345   Resp 13 01/25/23 1247   SpO2 91 % 01/25/23 1345           Post Anesthesia Care and Evaluation    Patient location during evaluation: bedside  Patient participation: complete - patient participated  Level of consciousness: awake  Pain management: adequate    Airway patency: patent  Anesthetic complications: No anesthetic complications  PONV Status: none  Cardiovascular status: acceptable and stable  Respiratory status: acceptable  Hydration status: acceptable    Comments: An Anesthesiologist personally participated in the most demanding procedures (including induction and emergence if applicable) in the anesthesia plan, monitored the course of anesthesia administration at frequent intervals and remained physically present and available for immediate diagnosis and treatment of emergencies.

## 2023-01-25 NOTE — DISCHARGE INSTRUCTIONS
DISCHARGE INSTRUCTIONS  Post-Operative  Arthroscopic Shoulder Surgery      For your surgery you had:  General anesthesia (you may have a sore throat for the first 24 hours)          You may experience dizziness, drowsiness, or light-headedness for several hours following surgery/procedure.  Do not stay alone today or tonight.  Limit your activity for 24 hours.  Resume your diet slowly.  Follow whatever special dietary instructions you may have been given by your doctor.  You should not drive or operate machinery or drink alcohol for 24 hours or while you are taking pain medication.  You should not sign legally binding documents.  Post-Operative Day #1 is counted as the 1st day after surgery.  [x] If you had a regional block, you will not have control of your arm for up to 12 hours.  Protect the arm with a sling or follow your physician's specific instructions.  Post-Operative Day #3 saturday  Remove your dressing.  Under the dressing you will either have sutures or staples.  Change dressing once or twice daily.  Place a dry dressing or band-aids over the small incisions.  Prior to dressing change, wash your hands.  Post-Operative Day #3 saturday  You may shower.  During the shower, you may let the water hit the incision and roll down but do not scrub or place any soap on the incision.  Do not soak it.  Pat it dry and do not put any ointments on the incision unless directed by surgeon. Then replace the dry dressing.    General Instructions  [x] Use ice pack to shoulder for 72 hours.  This can help with reducing swelling and pain relief.  Apply 20 minutes on and 20 minutes off.  Never place ice directly on skin.  Avoid getting dressing wet.    Keep arm elevated with sling to decrease swelling and minimize throbbing pain.  The sling will provide support for your shoulder.  It is important to remove the sling 3-5 times daily to work on range-of-motion of the elbow, wrist and hand.  You will receive a prescription for  physical therapy, unless otherwise instructed by physician.  After most shoulder surgeries, it is permissible to start exercises by slowing trying to crawl your fingers up a wall until your arm is parallel to the floor.  While doing this support the affected arm at the elbow or closer to the shoulder.  You may also lean over and let the arm and hand do small or large circles or write the alphabet with your hanging arm to keep it loose.  It is normal to have some pain with these gentle exercises.  The exercises help reduce swelling and pain overall and help to avoid a stiff shoulder post-operatively.  It is okay to come out of the sling for showering or for certain activities of daily living but avoid any lifting or carrying with the affected arm until instructed by the physician especially if you have had a repair of the rotator cuff or some type of reconstructive procedure.  It is okay to come out of the sling and support the arm with a pillow if you remain sedentary.  In general, sling use is preferred following a repair or reconstruction for 4 weeks.  If you have had a SAD (sub acromial decompression), continue sling use more liberally because there was not a repair or reconstruction that could be harmed.          DISCHARGE INSTRUCTIONS  Post-Operative   Arthroscopic Shoulder Surgery      Exercise fingers for 10 minutes every hour while awake.  The physician will typically inform the family and the patient whether or not a repair or reconstruction could be harmed.  If you have had a rotator cuff repair or reconstructive procedure, do not let the arm drop down suddenly from an elevated position down to your side despite improvements made in pain and over the 3 months following repair and reconstruction.  It generally takes 8-12 weeks before the repair or reconstruction does not rely on sutures and anchors that are placed for the repair.  You are generally given a prescription for a narcotic medication.  Take as  prescribed.  You may use over-the-counter anti-inflammatory medications such as Motrin or Aleve for additional pain or fever reduction if not allergic.  If you are taking the pain medication prescribed, do not take Tylenol too unless you consult with the pharmacist. The pain medications generally have Tylenol in them and too much Tylenol can be harmful.  Sometimes it is recommended to take an anti-inflammatory in between doses of prescription pain medication if additional pain relief is needed.  Consult with your physician or your pharmacist before taking over-the-counter pain medications/anti-inflammatories.  It is not uncommon to have a fever post-operatively especially in the first 24-48 hours.  Anti-inflammatories can be used for fever reduction also.  It is normal to have some redness or irritation around skin sutures or staples, however, if you have any expanding areas of redness with a persistent fever and increasing pain notify the physician as soon as possible.  The incidence of blood clots is low following arthroscopic procedures, however, if you notice any increasing pain or swelling in your calves or legs, contact the physician as soon as possible.   It is difficult to sleep in the customary fashion following a shoulder surgery.  It is usually necessary to sleep with the shoulder above the level of the heart such as in a recliner, couch or with the head of the bed elevated.  This should slowly improve over the weeks following shoulder surgery.  If unable to urinate 6 to 8 hours after surgery or urinating frequently in small amounts, notify the physician or go to the nearest Emergency Room.  SPECIAL INSTRUCTIONS:        NOTIFY YOUR PHYSICIAN IF YOU EXPERIENCE:  Numbness of fingers.  Inability to move fingers.  Extreme coldness, paleness or blue dis-coloration of fingers.  Any pain other than from the incision, such as swelling of the arm that blocks circulation of fingers.  Follow verbal instructions from  your doctor.  Medications per physician instructions as indicated on Discharge Medication Information Sheet.  You should see DrTuan ______________________for follow-up care  on     Phone number: __________________________________  Missing your appointment/follow-up could lead to serious complications  If you have an emergency and cannot reach your doctor, go to an Emergency Room nearest your home.

## 2023-01-27 ENCOUNTER — TELEPHONE (OUTPATIENT)
Dept: FAMILY MEDICINE CLINIC | Facility: CLINIC | Age: 63
End: 2023-01-27
Payer: OTHER GOVERNMENT

## 2023-01-27 ENCOUNTER — TELEPHONE (OUTPATIENT)
Dept: ORTHOPEDIC SURGERY | Facility: CLINIC | Age: 63
End: 2023-01-27

## 2023-01-27 DIAGNOSIS — M75.101 TEAR OF RIGHT ROTATOR CUFF, UNSPECIFIED TEAR EXTENT, UNSPECIFIED WHETHER TRAUMATIC: Primary | ICD-10-CM

## 2023-01-27 NOTE — TELEPHONE ENCOUNTER
Caller: Carlos Manuel Sanchez    Relationship: Self    Best call back number: [phone]    What is the medical concern/diagnosis: PHYSICAL THERAPY FOR SHOULDER    What specialty or service is being requested: PHYSICAL THERAPY    What is the provider, practice or medical service name: [facility]    What is the office location: [address] [place] [address], [facility]    What is the office phone number:  [phone] IS THE FAX NUMBER    ADDITIONAL INFO: PATIENT HAS APPOINTMENT WITH PT ON Monday 1/30/23.

## 2023-01-27 NOTE — TELEPHONE ENCOUNTER
Hub staff attempted to follow warm transfer process and was unsuccessful    Caller: LASHONDA SANTANA    Relationship to patient: SELF    Best call back number: [phone]    Patient is needing: CHECKING TO SEE IF REMBERTO LEAL WAS SENT TO [facility] FOR HIS POST-OP PHYSICAL THERAPY.

## 2023-02-03 ENCOUNTER — OFFICE VISIT (OUTPATIENT)
Dept: CARDIOLOGY | Facility: CLINIC | Age: 63
End: 2023-02-03
Payer: OTHER GOVERNMENT

## 2023-02-03 VITALS
BODY MASS INDEX: 29.58 KG/M2 | HEIGHT: 70 IN | SYSTOLIC BLOOD PRESSURE: 131 MMHG | DIASTOLIC BLOOD PRESSURE: 86 MMHG | WEIGHT: 206.6 LBS | HEART RATE: 92 BPM

## 2023-02-03 DIAGNOSIS — E78.5 HYPERLIPIDEMIA LDL GOAL <70: ICD-10-CM

## 2023-02-03 DIAGNOSIS — I25.10 CAD S/P PERCUTANEOUS CORONARY ANGIOPLASTY: Primary | ICD-10-CM

## 2023-02-03 DIAGNOSIS — I10 PRIMARY HYPERTENSION: ICD-10-CM

## 2023-02-03 DIAGNOSIS — Z98.61 CAD S/P PERCUTANEOUS CORONARY ANGIOPLASTY: Primary | ICD-10-CM

## 2023-02-03 PROBLEM — I51.9 HEART DISEASE: Status: RESOLVED | Noted: 2021-08-02 | Resolved: 2023-02-03

## 2023-02-03 PROCEDURE — 99214 OFFICE O/P EST MOD 30 MIN: CPT | Performed by: NURSE PRACTITIONER

## 2023-02-03 NOTE — PROGRESS NOTES
Chief Complaint  Coronary Artery Disease, Hypertension, Hyperlipidemia, and Follow-up    Subjective            History of Present Illness  Carlos Manuel Sanchez is a 62-year-old white/ male patient who presents to the office today for follow-up.  He has CAD with prior stenting, hypertension, and hyperlipidemia.  He reports compliance with all his medications.  He denies any chest pain, shortness of breath, lightheadedness/dizziness, palpitations, or edema.    PMH  Past Medical History:   Diagnosis Date   • Coronary artery disease     BLOCKAGE 1 STENT PLACED 2000, SEE'S DR WARD, DENIED CP/SOB    • Heart disease     stent placed 2000   • HTN (hypertension)    • Hyperlipemia    • Rotator cuff tear, right          ALLERGY  No Known Allergies       SURGICALHX  Past Surgical History:   Procedure Laterality Date   • APPENDECTOMY     • COLONOSCOPY N/A 10/18/2021    Procedure: COLONOSCOPY with hot snare polypectomies, clips applied x2;  Surgeon: Haroldo Perez MD;  Location: [facility] ENDOSCOPY;  Service: Gastroenterology;  Laterality: N/A;  cecum polyp, transverse polyp   • CORONARY STENT PLACEMENT  2000 1 STENT PLACED   • EXCISION LESION Right 02/25/2022    Procedure: excision of subcutaneous mass from right arm;  Surgeon: Jeff Tomas MD;  Location: [facility] MAIN OR;  Service: General;  Laterality: Right;   • ROTATOR CUFF REPAIR Left    • SHOULDER ARTHROSCOPY W/ ROTATOR CUFF REPAIR Right 1/25/2023    Procedure: SHOULDER ARTHROSCOPY WITH ROTATOR CUFF REPAIR, SUBCROMIAL DECOMPRESSION,DISTAL CLAVICLE RESECTION;  Surgeon: Lisa James MD;  [place] [facility] [place] [facility] [place]  Service: Orthopedics;  Laterality: Right;          SOC  Social History     Socioeconomic History   • Marital status:    Tobacco Use   • Smoking status: Never   • Smokeless tobacco: Never   Vaping Use   • Vaping Use: Never used   Substance and Sexual Activity   • Alcohol use: Yes     Comment: OCCASIONALLY   • Drug use: Never   • Sexual  "activity: Defer         FAMHX  Family History   Problem Relation Age of Onset   • Heart disease Mother    • Heart disease Father    • Cancer Father    • Heart disease Sister    • Heart disease Brother    • Lung cancer Other    • Malig Hyperthermia Neg Hx         Current outpatient and discharge medications have been reconciled for the patient.  Reviewed by: ENRIQUE Sanderson  Current Outpatient Medications on File Prior to Visit   Medication Sig   • aspirin 81 MG EC tablet Take  by mouth Daily. MAY HOLD FOR 7 DAYS PER DR. TERRAZAS. LAST DOSE 01/19/23 PER PATIENT   • B Complex-Biotin-FA (B Complete) tablet Take  by mouth.   • HYDROcodone-acetaminophen (NORCO) 7.5-325 MG per tablet Take 1-2 tablets by mouth Every 4 (Four) Hours As Needed for Moderate Pain (Pain).   • lisinopril (PRINIVIL,ZESTRIL) 20 MG tablet TAKE 1 TABLET DAILY   • loratadine (Claritin) 10 MG tablet Take 1 tablet by mouth 2 (Two) Times a Day As Needed (urticaria).   • Repatha SureClick solution auto-injector SureClick injection INJECT 1 ML (140 MG) UNDER THE SKIN EVERY 2 WEEKS IN THE ABDOMEN, THIGH OR OUTER AREA OF UPPER ARM (ROTATE SITES)   • rosuvastatin (CRESTOR) 20 MG tablet TAKE 1 TABLET DAILY   • sildenafil (VIAGRA) 100 MG tablet Take 100 mg by mouth Daily As Needed.     No current facility-administered medications on file prior to visit.         Objective   /86   Pulse 92   Ht 177.8 cm (70\")   Wt 93.7 kg (206 lb 9.6 oz)   BMI 29.64 kg/m²       Physical Exam  HENT:      Head: Normocephalic.   Neck:      Vascular: No carotid bruit.   Cardiovascular:      Rate and Rhythm: Normal rate and regular rhythm.      Pulses: Normal pulses.      Heart sounds: Normal heart sounds. No murmur heard.  Pulmonary:      Effort: Pulmonary effort is normal.      Breath sounds: Normal breath sounds.   Musculoskeletal:      Cervical back: Neck supple.      Right lower leg: No edema.      Left lower leg: No edema.   Skin:     General: Skin " is dry.      Capillary Refill: Capillary refill takes less than 2 seconds.   Neurological:      Mental Status: He is oriented to person, place, and time.   Psychiatric:         Behavior: Behavior normal.       Result Review :   The following data was reviewed by: ENRIQUE Lees on 02/03/2023:  No results found for: PROBNP  CMP    CMP 7/11/22 7/14/22   Glucose 114 (A)    BUN 15    Creatinine 1.25    eGFR 65.9    Sodium 139    Potassium 4.7    Chloride 103    Calcium 9.5    Total Protein 6.7 7.0   Albumin 4.10 4.20   Globulin 2.6    Total Bilirubin 0.4 0.5   Alkaline Phosphatase 72 76   AST (SGOT) 28 36   ALT (SGPT) 35 41   Albumin/Globulin Ratio 1.6    BUN/Creatinine Ratio 12.0    Anion Gap 8.7    (A) Abnormal value       Comments are available for some flowsheets but are not being displayed.           CBC w/diff    CBC w/Diff 7/11/22   WBC 4.83   RBC 5.53   Hemoglobin 16.4   Hematocrit 48.6   MCV 87.9   MCH 29.7   MCHC 33.7   RDW 11.7 (A)   Platelets 184   Neutrophil Rel % 66.5   Immature Granulocyte Rel % 0.2   Lymphocyte Rel % 18.0 (A)   Monocyte Rel % 12.4 (A)   Eosinophil Rel % 2.5   Basophil Rel % 0.4   (A) Abnormal value             No results found for: TSH   No results found for: FREET4   No results found for: DDIMERQUANT  No results found for: MG   No results found for: DIGOXIN   No results found for: TROPONINT        Lipid Panel    Lipid Panel 7/14/22   Total Cholesterol 141   Triglycerides 59   HDL Cholesterol 76 (A)   VLDL Cholesterol 12   LDL Cholesterol  53   LDL/HDL Ratio 0.70   (A) Abnormal value              Results for orders placed in visit on 06/14/21    Adult Transthoracic Echo Complete w/ Color, Spectral and Contrast if necessary per protocol    Interpretation Summary  · Calculated left ventricular 3D EF = 61% Left ventricular ejection fraction appears to be 56 - 60%.         Assessment and Plan    Diagnoses and all orders for this visit:    1. CAD S/P percutaneous coronary angioplasty  (Primary)  He denies any anginal symptoms, continue aspirin 81 mg daily.    2. Primary hypertension  Currently controlled and without adverse effects from medication, continue lisinopril 20 mg daily.    3. Hyperlipidemia LDL goal <70  Last lipid panel was 7/14/2022 with LDL 53 which is within his goal range, continue rosuvastatin 20 mg daily.          Follow Up   Return in about 6 months (around 8/3/2023) for Follow up with Dr Clements.    Patient was given instructions and counseling regarding his condition or for health maintenance advice. Please see specific information pulled into the AVS if appropriate.     Carlos Manuel Daniel  reports that he has never smoked. He has never used smokeless tobacco.           Sarah White, APRN  02/03/23  08:08 EST    Dictated Utilizing Dragon Dictation

## 2023-02-09 ENCOUNTER — OFFICE VISIT (OUTPATIENT)
Dept: ORTHOPEDIC SURGERY | Facility: CLINIC | Age: 63
End: 2023-02-09
Payer: OTHER GOVERNMENT

## 2023-02-09 VITALS — WEIGHT: 206 LBS | BODY MASS INDEX: 29.49 KG/M2 | HEIGHT: 70 IN

## 2023-02-09 DIAGNOSIS — M75.101 TEAR OF RIGHT ROTATOR CUFF, UNSPECIFIED TEAR EXTENT, UNSPECIFIED WHETHER TRAUMATIC: ICD-10-CM

## 2023-02-09 DIAGNOSIS — Z47.89 AFTERCARE FOLLOWING SURGERY OF THE MUSCULOSKELETAL SYSTEM: Primary | ICD-10-CM

## 2023-02-09 PROCEDURE — 99024 POSTOP FOLLOW-UP VISIT: CPT | Performed by: PHYSICIAN ASSISTANT

## 2023-02-09 RX ORDER — HYDROCODONE BITARTRATE AND ACETAMINOPHEN 7.5; 325 MG/1; MG/1
1-2 TABLET ORAL EVERY 4 HOURS PRN
Qty: 42 TABLET | Refills: 0 | Status: SHIPPED | OUTPATIENT
Start: 2023-02-09 | End: 2023-03-02 | Stop reason: SDUPTHER

## 2023-02-09 NOTE — PATIENT INSTRUCTIONS
Sutures removed in office today. Steri-strips applied. Patient educated on incision care. May shower, but do not submerge in water until fully healed. Do not apply creams or lotions.     Continue sling for the next 4 weeks. May remove for hygiene and continue shoulder pendulums, gentle range of motion exercises to the elbow, wrist, and hand/fingers. No active range of motion of the shoulder. No lifting, pushing, or pulling.     Order sent for PT/OT to start passive stretching/gentle range of motion only.     Follow-up in 4 weeks. No imaging. Call with any changes or concerns.

## 2023-02-09 NOTE — PROGRESS NOTES
"Chief Complaint  Pain and Follow-up of the Right Shoulder    Subjective      Carlos Manuel Sanchez presents to [facility] for follow-up of right shoulder arthroscopy with mini open rotator cuff repair, arthroscopic extensive subacromial decompression, and distal clavicle resection performed on 1/25/2023 by Dr. James.  Patient presents today with sling in place.  Reports pain primarily in the evenings only.  He has started outpatient physical therapy at [facility] in [place], attending twice weekly.  He has been performing home exercises with shoulder pendulums, gentle ROM.    Objective   No Known Allergies    Vital Signs:   Ht 177.8 cm (70\")   Wt 93.4 kg (206 lb)   BMI 29.56 kg/m²       Physical Exam    Constitutional: Awake, alert. Well nourished appearance.    Integumentary: Warm, dry, intact. No obvious rashes.    HENT: Atraumatic, normocephalic.   Respiratory: Non labored respirations .   Cardiovascular: Intact peripheral pulses.    Psychiatric: Normal mood and affect. A&O X3    Ortho Exam  Right shoulder: Skin is warm, dry, and intact.  Sutures removed in office today.  Well-healing surgical incisions visualized, which are clean, dry, and intact.  No evidence of wound dehiscence, surrounding erythema, warmth, or drainage.  Full flexion and extension of the wrist and elbow.  Full supination and pronation.  Patient is able to form a full fist.  Good thumb opposition.  Sensation intact light touch in distal neurovascular intact.     Imaging Results (Most Recent)     None                  Assessment and Plan   Problem List Items Addressed This Visit    None  Visit Diagnoses     Aftercare following R shoulder arthroscopy with RCR, DIONNE, DC    -  Primary        Follow Up   Return in about 4 weeks (around 3/9/2023).  Patient is a non-smoker.  Did not discuss options for smoking cessation.    Patient Instructions   Sutures removed in office today. Steri-strips applied. Patient educated on " incision care. May shower, but do not submerge in water until fully healed. Do not apply creams or lotions.     Continue sling for the next 4 weeks. May remove for hygiene and continue shoulder pendulums, gentle range of motion exercises to the elbow, wrist, and hand/fingers. No active range of motion of the shoulder. No lifting, pushing, or pulling.     Order sent for PT/OT to start passive stretching/gentle range of motion only.     Follow-up in 4 weeks. No imaging. Call with any changes or concerns.       Patient was given instructions and counseling regarding his condition or for health maintenance advice. Please see specific information pulled into the AVS if appropriate.

## 2023-02-22 ENCOUNTER — OFFICE VISIT (OUTPATIENT)
Dept: FAMILY MEDICINE CLINIC | Facility: CLINIC | Age: 63
End: 2023-02-22
Payer: OTHER GOVERNMENT

## 2023-02-22 VITALS
HEART RATE: 101 BPM | DIASTOLIC BLOOD PRESSURE: 82 MMHG | TEMPERATURE: 98.6 F | OXYGEN SATURATION: 98 % | SYSTOLIC BLOOD PRESSURE: 122 MMHG | HEIGHT: 70 IN | BODY MASS INDEX: 29.69 KG/M2 | WEIGHT: 207.4 LBS

## 2023-02-22 DIAGNOSIS — L98.9 SKIN LESION: Primary | ICD-10-CM

## 2023-02-22 PROCEDURE — 88304 TISSUE EXAM BY PATHOLOGIST: CPT | Performed by: FAMILY MEDICINE

## 2023-02-22 PROCEDURE — 11102 TANGNTL BX SKIN SINGLE LES: CPT | Performed by: FAMILY MEDICINE

## 2023-02-22 NOTE — PROGRESS NOTES
"Chief Complaint  Skin lesion    Subjective        Carlos Manuel Sanchez presents to [facility]  History of Present Illness  Patient presents today to discuss a skin lesion on his left upper back that has been very tender for the past 3 weeks.  He has had this for several years but has gotten larger.  It has irritated him more as well.  He reports that it was ever since he had his right shoulder surgery.  He does have other skin tags.  Objective   Vital Signs:  /82 (BP Location: Left arm, Patient Position: Sitting)   Pulse 101   Temp 98.6 °F (37 °C) (Oral)   Ht 177.8 cm (70\")   Wt 94.1 kg (207 lb 6.4 oz)   SpO2 98%   BMI 29.76 kg/m²   Estimated body mass index is 29.76 kg/m² as calculated from the following:    Height as of this encounter: 177.8 cm (70\").    Weight as of this encounter: 94.1 kg (207 lb 6.4 oz).             Physical Exam   General appearance: Pleasant, nonagitated, no acute distress  Skin: See photo below.  This lesion measures 6 mm x 6 mm at the largest diameter and 3 x 3 mm at the base.  There is some mild erythema.  No overt signs of infection.  There is slight hyperpigmentation noted on the lesion as well.        Result Review :            Skin Biopsy    Date/Time: 2/22/2023 11:54 AM  Performed by: Kurt Arango DO  Authorized by: Kurt Arango DO       Consent was given by the patient. Informed consent was obtained through verbal consent and written consent consent.     Procedure:  A shave biopsy was performed. The total number of sites biopsied: 1.   Lidocaine 1% without epinephrine was injected for anesthesia. 2 cc's were injected. Hemostasis was obtained with electrocautery. The site was closed with none. The specimen was sent for pathology. After the procedure, the site was to have no obvious complications and good hemostasis. The wound was treated with a dressing. The procedure was tolerated well with no immediate complications. Comments: " Shave biopsy was performed of the skin lesion.           Assessment and Plan   Diagnoses and all orders for this visit:    1. Skin lesion (Primary)  -     Tissue Pathology Exam  -     Skin Biopsy    We discussed shave biopsy today.  Postprocedural instructions were given the patient.  He is instructed to call with any questions or concerns.  I plan to see him back in 10 days for follow-up.         Follow Up   No follow-ups on file.  Patient was given instructions and counseling regarding his condition or for health maintenance advice. Please see specific information pulled into the AVS if appropriate.

## 2023-02-23 ENCOUNTER — LAB REQUISITION (OUTPATIENT)
Dept: LAB | Facility: HOSPITAL | Age: 63
End: 2023-02-23
Payer: OTHER GOVERNMENT

## 2023-02-23 DIAGNOSIS — L91.8 OTHER HYPERTROPHIC DISORDERS OF THE SKIN: ICD-10-CM

## 2023-03-02 DIAGNOSIS — M75.101 TEAR OF RIGHT ROTATOR CUFF, UNSPECIFIED TEAR EXTENT, UNSPECIFIED WHETHER TRAUMATIC: ICD-10-CM

## 2023-03-02 RX ORDER — HYDROCODONE BITARTRATE AND ACETAMINOPHEN 7.5; 325 MG/1; MG/1
1 TABLET ORAL EVERY 4 HOURS PRN
Qty: 42 TABLET | Refills: 0 | Status: SHIPPED | OUTPATIENT
Start: 2023-03-02

## 2023-03-09 ENCOUNTER — OFFICE VISIT (OUTPATIENT)
Dept: ORTHOPEDIC SURGERY | Facility: CLINIC | Age: 63
End: 2023-03-09
Payer: OTHER GOVERNMENT

## 2023-03-09 ENCOUNTER — TELEPHONE (OUTPATIENT)
Dept: FAMILY MEDICINE CLINIC | Facility: CLINIC | Age: 63
End: 2023-03-09

## 2023-03-09 ENCOUNTER — OFFICE VISIT (OUTPATIENT)
Dept: FAMILY MEDICINE CLINIC | Facility: CLINIC | Age: 63
End: 2023-03-09
Payer: OTHER GOVERNMENT

## 2023-03-09 VITALS
SYSTOLIC BLOOD PRESSURE: 110 MMHG | OXYGEN SATURATION: 98 % | WEIGHT: 209.9 LBS | HEIGHT: 70 IN | TEMPERATURE: 98 F | BODY MASS INDEX: 30.05 KG/M2 | DIASTOLIC BLOOD PRESSURE: 64 MMHG | HEART RATE: 68 BPM

## 2023-03-09 VITALS — HEIGHT: 70 IN | WEIGHT: 207 LBS | BODY MASS INDEX: 29.63 KG/M2

## 2023-03-09 DIAGNOSIS — G89.29 CHRONIC LEFT SHOULDER PAIN: ICD-10-CM

## 2023-03-09 DIAGNOSIS — L91.8 SKIN TAG: Primary | ICD-10-CM

## 2023-03-09 DIAGNOSIS — Z47.89 AFTERCARE FOLLOWING SURGERY OF THE MUSCULOSKELETAL SYSTEM: Primary | ICD-10-CM

## 2023-03-09 DIAGNOSIS — M25.511 CHRONIC RIGHT SHOULDER PAIN: ICD-10-CM

## 2023-03-09 DIAGNOSIS — G89.29 CHRONIC RIGHT SHOULDER PAIN: ICD-10-CM

## 2023-03-09 DIAGNOSIS — M25.512 CHRONIC LEFT SHOULDER PAIN: ICD-10-CM

## 2023-03-09 PROCEDURE — 99024 POSTOP FOLLOW-UP VISIT: CPT | Performed by: PHYSICIAN ASSISTANT

## 2023-03-09 PROCEDURE — 99212 OFFICE O/P EST SF 10 MIN: CPT | Performed by: FAMILY MEDICINE

## 2023-03-09 NOTE — TELEPHONE ENCOUNTER
Received call from [facility] requesting orders to continue seeing patient for an additional 15 visits. Requested order be sent to them via fax at [phone]

## 2023-03-09 NOTE — PROGRESS NOTES
"Chief Complaint  Pain and Follow-up of the Right Shoulder    Subjective      Carlos Manuel Sanchez presents to [facility] for follow-up of right shoulder arthroscopy with mini open rotator cuff repair, arthroscopic extensive subacromial decompression, distal clavicle resection performed on 1/25/2023 by Dr. James.  He presents today without sling in place, although reports this is out in his truck and he has been using this as directed.  Reports that he is doing \"pretty good\".  Does report his shoulder is \"painful\" after physical therapy and at bedtime.  He does feel his shoulder is progressing as expected.  Reports he is familiar with rotator cuff repair rehab from previous left shoulder RCR repair.    Objective   No Known Allergies    Vital Signs:   Ht 177.8 cm (70\")   Wt 93.9 kg (207 lb)   BMI 29.70 kg/m²       Physical Exam    Constitutional: Awake, alert. Well nourished appearance.    Integumentary: Warm, dry, intact. No obvious rashes.    HENT: Atraumatic, normocephalic.   Respiratory: Non labored respirations .   Cardiovascular: Intact peripheral pulses.    Psychiatric: Normal mood and affect. A&O X3    Ortho Exam  Right shoulder: Well-healed surgical scars noted.  Skin is warm, dry, and intact.  Active assisted forward flexion to 155 degrees.  Full flexion and extension of the wrist and elbow.  Full supination and pronation.  Patient is able to form a full fist.  Sensation intact to light touch.  Distal neurovascular intact.    Imaging Results (Most Recent)     None                    Assessment and Plan   Problem List Items Addressed This Visit    None  Visit Diagnoses     Aftercare following R shoulder arthroscopy with RCR, SAD, DC    -  Primary        Follow Up   Return in about 6 weeks (around 4/20/2023).  Patient is a non-smoker. Did not discuss options for smoking cessation.    Patient Instructions   Patient may discontinue sling use. Advised still no active ROM of the shoulder, " until directed by PT. continue physical therapy and patient may start active assist ROM/exercises. No lifting, pushing, or pulling. Nothing heavier than 1 lb in affected arm.    Continue icing shoulder up to 3-4 times daily for no longer than 15 to 20 minutes at a time as needed for pain or swelling.    Follow-up in 6 weeks. Call with changes or concerns.  No imaging.        Patient was given instructions and counseling regarding his condition or for health maintenance advice. Please see specific information pulled into the AVS if appropriate.

## 2023-03-09 NOTE — PATIENT INSTRUCTIONS
Patient may discontinue sling use. Advised still no active ROM of the shoulder, until directed by PT. continue physical therapy and patient may start active assist ROM/exercises. No lifting, pushing, or pulling. Nothing heavier than 1 lb in affected arm.    Continue icing shoulder up to 3-4 times daily for no longer than 15 to 20 minutes at a time as needed for pain or swelling.    Follow-up in 6 weeks. Call with changes or concerns.  No imaging.       Fracture of elbow  left 2011 s/p surgery

## 2023-03-09 NOTE — PROGRESS NOTES
"Chief Complaint  Skin procedure follow up  Bilateral shoulder pain    Subjective        Carlos Manuel Sanchez presents to [facility]  History of Present Illness  Patient presents today to follow-up from the shave biopsy that he had on 2/22/2023.  The result came back as a fibroepithelial polyp or skin tag.  He has not had any issues with the skin care following the procedure.  The discomfort and pain he was experiencing prior to the lesion being removed is gone now.  I did discuss with him the benign pathological finding and reassurance was provided today.  Patient is also still being seen by orthopedics for follow-up for surgery of the right shoulder which included mini open rotator cuff repair and arthroscopic extensive subacromial decompression with distal clavicle resection on 1/25/2023.  He is still continuing physical therapy but may be discontinuing this soon.  He overall has done well.  He would also like to discuss his left shoulder as this still will give him problems periodically.  He had arthroscopic SCD/DCE mini open rotator cuff repair on 6/10/2020 with Dr. James.  He still will have difficulty sometimes holding a crossbow.  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  I did discuss with him dropping off this paperwork.  Objective   Vital Signs:  /64 (BP Location: Left arm, Patient Position: Sitting)   Pulse 68   Temp 98 °F (36.7 °C) (Oral)   Ht 177.8 cm (70\")   Wt 95.2 kg (209 lb 14.4 oz)   SpO2 98%   BMI 30.12 kg/m²   Estimated body mass index is 30.12 kg/m² as calculated from the following:    Height as of this encounter: 177.8 cm (70\").    Weight as of this encounter: 95.2 kg (209 lb 14.4 oz).             Physical Exam    General: AAO ×3, no acute distress, pleasant  HEENT: Normocephalic, atraumatic  Musculoskeletal: Good range of motion of the left shoulder.  The right shoulder is somewhat restricted in abduction " however he is able to raise his arm to about 150 degrees and abduction.  Skin: Wound noted on the left upper back region where the shave biopsy was performed.  There is hemostasis.  No evidence of bleeding.  No signs of infection.  There is a scab.  Result Review :                   Assessment and Plan   Diagnoses and all orders for this visit:    1. Skin tag (Primary)    2. Chronic right shoulder pain    3. Chronic left shoulder pain    Results are reassuring.  I discussed with patient having him drop off paperwork in regards to having alterations done while hunting.  I plan to see him back for his next regular scheduled appointment or sooner if needed.  He is instructed to call with any questions or concerns.         Follow Up   Return if symptoms worsen or fail to improve.  Patient was given instructions and counseling regarding his condition or for health maintenance advice. Please see specific information pulled into the AVS if appropriate.

## 2023-03-14 ENCOUNTER — TELEPHONE (OUTPATIENT)
Dept: FAMILY MEDICINE CLINIC | Facility: CLINIC | Age: 63
End: 2023-03-14
Payer: OTHER GOVERNMENT

## 2023-03-14 NOTE — TELEPHONE ENCOUNTER
Phone call placed to patient with consultation of provider to extend visits with coverage per insurance.

## 2023-04-17 RX ORDER — ROSUVASTATIN CALCIUM 20 MG/1
TABLET, COATED ORAL
Qty: 90 TABLET | Refills: 3 | Status: SHIPPED | OUTPATIENT
Start: 2023-04-17

## 2023-04-28 ENCOUNTER — HOSPITAL ENCOUNTER (EMERGENCY)
Facility: HOSPITAL | Age: 63
Discharge: HOME | End: 2023-04-28
Attending: EMERGENCY MEDICINE
Payer: OTHER GOVERNMENT

## 2023-04-28 ENCOUNTER — OFFICE VISIT (OUTPATIENT)
Dept: ORTHOPEDIC SURGERY | Facility: CLINIC | Age: 63
End: 2023-04-28
Payer: OTHER GOVERNMENT

## 2023-04-28 ENCOUNTER — APPOINTMENT (OUTPATIENT)
Dept: GENERAL RADIOLOGY | Facility: HOSPITAL | Age: 63
End: 2023-04-28
Payer: OTHER GOVERNMENT

## 2023-04-28 VITALS
HEIGHT: 70 IN | HEART RATE: 118 BPM | OXYGEN SATURATION: 94 % | DIASTOLIC BLOOD PRESSURE: 99 MMHG | BODY MASS INDEX: 29.57 KG/M2 | WEIGHT: 206.57 LBS | TEMPERATURE: 98.4 F | SYSTOLIC BLOOD PRESSURE: 118 MMHG | RESPIRATION RATE: 20 BRPM

## 2023-04-28 VITALS
WEIGHT: 209 LBS | HEART RATE: 59 BPM | HEIGHT: 70 IN | BODY MASS INDEX: 29.92 KG/M2 | DIASTOLIC BLOOD PRESSURE: 75 MMHG | OXYGEN SATURATION: 97 % | SYSTOLIC BLOOD PRESSURE: 128 MMHG

## 2023-04-28 DIAGNOSIS — Z47.89 AFTERCARE FOLLOWING SURGERY OF THE MUSCULOSKELETAL SYSTEM: Primary | ICD-10-CM

## 2023-04-28 DIAGNOSIS — I48.0 PAROXYSMAL ATRIAL FIBRILLATION: Primary | ICD-10-CM

## 2023-04-28 DIAGNOSIS — R07.89 MUSCULOSKELETAL CHEST PAIN: ICD-10-CM

## 2023-04-28 DIAGNOSIS — M54.6 ACUTE BILATERAL THORACIC BACK PAIN: Primary | ICD-10-CM

## 2023-04-28 DIAGNOSIS — I48.91 NEW ONSET A-FIB: ICD-10-CM

## 2023-04-28 LAB
ALBUMIN SERPL-MCNC: 4 G/DL (ref 3.5–5.2)
ALBUMIN/GLOB SERPL: 1.2 G/DL
ALP SERPL-CCNC: 100 U/L (ref 39–117)
ALT SERPL W P-5'-P-CCNC: 28 U/L (ref 1–41)
ANION GAP SERPL CALCULATED.3IONS-SCNC: 12.4 MMOL/L (ref 5–15)
AST SERPL-CCNC: 20 U/L (ref 1–40)
BASOPHILS # BLD AUTO: 0.03 10*3/MM3 (ref 0–0.2)
BASOPHILS NFR BLD AUTO: 0.3 % (ref 0–1.5)
BILIRUB SERPL-MCNC: 0.4 MG/DL (ref 0–1.2)
BUN SERPL-MCNC: 14 MG/DL (ref 8–23)
BUN/CREAT SERPL: 15.7 (ref 7–25)
CALCIUM SPEC-SCNC: 9.6 MG/DL (ref 8.6–10.5)
CHLORIDE SERPL-SCNC: 99 MMOL/L (ref 98–107)
CO2 SERPL-SCNC: 23.6 MMOL/L (ref 22–29)
CREAT SERPL-MCNC: 0.89 MG/DL (ref 0.76–1.27)
DEPRECATED RDW RBC AUTO: 37.3 FL (ref 37–54)
EGFRCR SERPLBLD CKD-EPI 2021: 97.5 ML/MIN/1.73
EOSINOPHIL # BLD AUTO: 0.06 10*3/MM3 (ref 0–0.4)
EOSINOPHIL NFR BLD AUTO: 0.6 % (ref 0.3–6.2)
ERYTHROCYTE [DISTWIDTH] IN BLOOD BY AUTOMATED COUNT: 12.1 % (ref 12.3–15.4)
GEN 5 2HR TROPONIN T REFLEX: 13 NG/L
GLOBULIN UR ELPH-MCNC: 3.4 GM/DL
GLUCOSE SERPL-MCNC: 130 MG/DL (ref 65–99)
HCT VFR BLD AUTO: 49.1 % (ref 37.5–51)
HGB BLD-MCNC: 16.9 G/DL (ref 13–17.7)
HOLD SPECIMEN: NORMAL
HOLD SPECIMEN: NORMAL
IMM GRANULOCYTES # BLD AUTO: 0.05 10*3/MM3 (ref 0–0.05)
IMM GRANULOCYTES NFR BLD AUTO: 0.5 % (ref 0–0.5)
LIPASE SERPL-CCNC: 42 U/L (ref 13–60)
LYMPHOCYTES # BLD AUTO: 1.06 10*3/MM3 (ref 0.7–3.1)
LYMPHOCYTES NFR BLD AUTO: 10 % (ref 19.6–45.3)
MAGNESIUM SERPL-MCNC: 2 MG/DL (ref 1.6–2.4)
MCH RBC QN AUTO: 29.4 PG (ref 26.6–33)
MCHC RBC AUTO-ENTMCNC: 34.4 G/DL (ref 31.5–35.7)
MCV RBC AUTO: 85.5 FL (ref 79–97)
MONOCYTES # BLD AUTO: 0.97 10*3/MM3 (ref 0.1–0.9)
MONOCYTES NFR BLD AUTO: 9.2 % (ref 5–12)
NEUTROPHILS NFR BLD AUTO: 79.4 % (ref 42.7–76)
NEUTROPHILS NFR BLD AUTO: 8.43 10*3/MM3 (ref 1.7–7)
NRBC BLD AUTO-RTO: 0 /100 WBC (ref 0–0.2)
NT-PROBNP SERPL-MCNC: 381.5 PG/ML (ref 0–900)
PLATELET # BLD AUTO: 206 10*3/MM3 (ref 140–450)
PMV BLD AUTO: 8.7 FL (ref 6–12)
POTASSIUM SERPL-SCNC: 4.4 MMOL/L (ref 3.5–5.2)
PROT SERPL-MCNC: 7.4 G/DL (ref 6–8.5)
QT INTERVAL: 308 MS
QT INTERVAL: 318 MS
RBC # BLD AUTO: 5.74 10*6/MM3 (ref 4.14–5.8)
SODIUM SERPL-SCNC: 135 MMOL/L (ref 136–145)
TROPONIN T DELTA: 0 NG/L
TROPONIN T SERPL HS-MCNC: 13 NG/L
WBC NRBC COR # BLD: 10.6 10*3/MM3 (ref 3.4–10.8)
WHOLE BLOOD HOLD COAG: NORMAL
WHOLE BLOOD HOLD SPECIMEN: NORMAL

## 2023-04-28 PROCEDURE — 85025 COMPLETE CBC W/AUTO DIFF WBC: CPT | Performed by: EMERGENCY MEDICINE

## 2023-04-28 PROCEDURE — 80053 COMPREHEN METABOLIC PANEL: CPT | Performed by: EMERGENCY MEDICINE

## 2023-04-28 PROCEDURE — 71045 X-RAY EXAM CHEST 1 VIEW: CPT

## 2023-04-28 PROCEDURE — 83735 ASSAY OF MAGNESIUM: CPT | Performed by: EMERGENCY MEDICINE

## 2023-04-28 PROCEDURE — 97161 PT EVAL LOW COMPLEX 20 MIN: CPT | Performed by: PHYSICAL THERAPIST

## 2023-04-28 PROCEDURE — 97140 MANUAL THERAPY 1/> REGIONS: CPT | Performed by: PHYSICAL THERAPIST

## 2023-04-28 PROCEDURE — 84484 ASSAY OF TROPONIN QUANT: CPT | Performed by: EMERGENCY MEDICINE

## 2023-04-28 PROCEDURE — 96374 THER/PROPH/DIAG INJ IV PUSH: CPT

## 2023-04-28 PROCEDURE — 93005 ELECTROCARDIOGRAM TRACING: CPT

## 2023-04-28 PROCEDURE — 83880 ASSAY OF NATRIURETIC PEPTIDE: CPT | Performed by: EMERGENCY MEDICINE

## 2023-04-28 PROCEDURE — 99284 EMERGENCY DEPT VISIT MOD MDM: CPT

## 2023-04-28 PROCEDURE — 25010000002 KETOROLAC TROMETHAMINE PER 15 MG: Performed by: EMERGENCY MEDICINE

## 2023-04-28 PROCEDURE — 83690 ASSAY OF LIPASE: CPT | Performed by: EMERGENCY MEDICINE

## 2023-04-28 PROCEDURE — 93005 ELECTROCARDIOGRAM TRACING: CPT | Performed by: EMERGENCY MEDICINE

## 2023-04-28 PROCEDURE — 36415 COLL VENOUS BLD VENIPUNCTURE: CPT

## 2023-04-28 RX ORDER — ASPIRIN 81 MG/1
324 TABLET, CHEWABLE ORAL ONCE
Status: DISCONTINUED | OUTPATIENT
Start: 2023-04-28 | End: 2023-04-28

## 2023-04-28 RX ORDER — DICLOFENAC SODIUM 75 MG/1
75 TABLET, DELAYED RELEASE ORAL 2 TIMES DAILY PRN
Qty: 20 TABLET | Refills: 0 | Status: SHIPPED | OUTPATIENT
Start: 2023-04-28 | End: 2023-05-04

## 2023-04-28 RX ORDER — SODIUM CHLORIDE 0.9 % (FLUSH) 0.9 %
10 SYRINGE (ML) INJECTION AS NEEDED
Status: DISCONTINUED | OUTPATIENT
Start: 2023-04-28 | End: 2023-04-28 | Stop reason: HOSPADM

## 2023-04-28 RX ORDER — METOPROLOL SUCCINATE 25 MG/1
25 TABLET, EXTENDED RELEASE ORAL ONCE
Status: COMPLETED | OUTPATIENT
Start: 2023-04-28 | End: 2023-04-28

## 2023-04-28 RX ORDER — METOPROLOL SUCCINATE 25 MG/1
25 TABLET, EXTENDED RELEASE ORAL DAILY
Qty: 30 TABLET | Refills: 0 | Status: SHIPPED | OUTPATIENT
Start: 2023-04-28 | End: 2023-05-04 | Stop reason: SDUPTHER

## 2023-04-28 RX ORDER — KETOROLAC TROMETHAMINE 30 MG/ML
30 INJECTION, SOLUTION INTRAMUSCULAR; INTRAVENOUS ONCE
Status: COMPLETED | OUTPATIENT
Start: 2023-04-28 | End: 2023-04-28

## 2023-04-28 RX ADMIN — KETOROLAC TROMETHAMINE 30 MG: 30 INJECTION, SOLUTION INTRAMUSCULAR; INTRAVENOUS at 10:45

## 2023-04-28 RX ADMIN — METOPROLOL SUCCINATE 25 MG: 25 TABLET, EXTENDED RELEASE ORAL at 13:32

## 2023-04-28 RX ADMIN — APIXABAN 5 MG: 5 TABLET, FILM COATED ORAL at 13:32

## 2023-04-28 NOTE — DISCHARGE INSTRUCTIONS
Activity as tolerated.  Avoid any strenuous activities.  Apply cold and/or warm compresses to affected area.  Apply for 20 to 30 minutes 3-5 times per day as needed for pain relief.  Take the diclofenac prescription as prescribed as needed for pain.  Follow-up with Dr. Clements next week as scheduled for further work-up and treatment of your atrial fibrillation.  Return to the ER for severe chest pain, persistent rapid heart rate, severe dizziness, weakness in an arm or leg, or any other concerns issues that may arise.

## 2023-04-28 NOTE — PROGRESS NOTES
"Chief Complaint  Follow-up and Pain of the Right Shoulder    Subjective      Carlos Manuel Sanchez presents to [facility] for follow-up of right shoulder arthroscopy with mini open rotator cuff repair, arthroscopic extensive subacromial decompression, distal clavicle resection performed on 1/25/2023 by Dr. James.  He presents today for follow-up stating \"I am doing good as far as my shoulder\".  However, he is complaining of pain starting in his left shoulder, radiating around his chest, and into his neck and right shoulder.  Reports this started approximately 2 days ago and \"at first I thought it was my heart\".  He reports painful respirations and shortness of breath.  Does report that he had a recent long drive to [place] and back.  He also has a history of coronary artery disease status post stent placement approximately 23 years ago.  He does follow closely with cardiology, was last seen in January with unremarkable EKG.  Does report that it has been \"a few years\" since his last stress test.  He does have a history of hypertension, prediabetes, and known coronary artery disease.  He denies history of DVT or PE, although reports \"everyone in my family has a clotting disorder\".    Objective   No Known Allergies    Vital Signs:   /75   Pulse 59   Ht 177.8 cm (70\")   Wt 94.8 kg (209 lb)   SpO2 97%   BMI 29.99 kg/m²       Physical Exam    Constitutional: Awake, alert. Well nourished appearance.    Integumentary: Warm, dry, intact. No obvious rashes.    HENT: Atraumatic, normocephalic.   Respiratory: Non labored respirations .   Cardiovascular: Intact peripheral pulses.  Chest pain not reproducible on exam.  No tenderness to palpation.   Psychiatric: Normal mood and affect. A&O X3    Ortho Exam  Right shoulder: Well-healed surgical scars noted.  Active skin is warm, dry, and intact.  Full shoulder ROM.    Imaging Results (Most Recent)     None                  Assessment and Plan "   Problem List Items Addressed This Visit    None  Visit Diagnoses     Aftercare following R shoulder arthroscopy with RCR, SAD, DC    -  Primary        Follow Up   No follow-ups on file.  Patient is a non-smoker.  Did not discuss tobacco cessation options.    Patient Instructions   Patient with complaint of intermittent chest pain and SOB. Vitals are stable in office. Did discuss risks with patient. Refuses transport to ER. He verbalizes understanding and agreement and will proceed immediately to ER. Discussed with Dr. James, who is in agreement with this plan.     Patient was given instructions and counseling regarding his condition or for health maintenance advice. Please see specific information pulled into the AVS if appropriate.

## 2023-04-28 NOTE — THERAPY EVALUATION
Patient Name: Carlos Manuel Sanchez  : 1960    MRN: [id number]                              Today's Date: 2023       Admit Date: 2023    Visit Dx:     ICD-10-CM ICD-9-CM   1. Acute bilateral thoracic back pain  M54.6 724.1     Patient Active Problem List   Diagnosis   • Hyperlipidemia LDL goal <70   • Primary hypertension   • Colon cancer screening   • Urticaria due to cold   • Mass of arm, right   • CAD S/P percutaneous coronary angioplasty   • Rotator cuff tear, right     Past Medical History:   Diagnosis Date   • Coronary artery disease     BLOCKAGE 1 STENT PLACED , SEE'S DR WARD, DENIED CP/SOB    • Heart disease     stent placed    • HTN (hypertension)    • Hyperlipemia    • Rotator cuff syndrome 2022    When pulling back compound bow during hunting season   • Rotator cuff tear, right      Past Surgical History:   Procedure Laterality Date   • APPENDECTOMY     • COLONOSCOPY N/A 10/18/2021    Procedure: COLONOSCOPY with hot snare polypectomies, clips applied x2;  Surgeon: Haroldo Perez MD;  Location: [facility] ENDOSCOPY;  Service: Gastroenterology;  Laterality: N/A;  cecum polyp, transverse polyp   • CORONARY STENT PLACEMENT      1 STENT PLACED   • EXCISION LESION Right 2022    Procedure: excision of subcutaneous mass from right arm;  Surgeon: Jeff Tomas MD;  Location: [facility] MAIN OR;  Service: General;  Laterality: Right;   • ROTATOR CUFF REPAIR Left    • SHOULDER ARTHROSCOPY W/ ROTATOR CUFF REPAIR Right 2023    Procedure: SHOULDER ARTHROSCOPY WITH ROTATOR CUFF REPAIR, SUBCROMIAL DECOMPRESSION,DISTAL CLAVICLE RESECTION;  Surgeon: Lisa James MD;  Location: [facility] OR [facility] [place];  Service: Orthopedics;  Laterality: Right;   • SHOULDER SURGERY  2023    Right shoulder      General Information     Row Name 23 1031          Physical Therapy Time and Intention    Document Type evaluation  -LR     Mode of Treatment individual therapy  -LR     Row Name 23  1031          General Information    Patient Profile Reviewed yes  -LR     Prior Level of Function independent:  -LR           User Key  (r) = Recorded By, (t) = Taken By, (c) = Cosigned By    Initials Name Provider Type    Mell Arthur PT Physical Therapist              History: Pt reports he drove to [place] and back last weekend.  On Monday he did yard work and on Tuesday he started having pain in the L side of his shoulder blade that moved into his neck and chest.  Now the pain has moved over to the R side and is primarily on that side.  He reports increased pain with movement.  Standing is the most comfortable position.  Pain is currently a 9/10.  He denies numbness/tingling.  He is RHD.  Pt reports a R rotator cuff repair three months ago.    Objective:    Palpation: Tender to palpation at T4 spinous process and R thoracic paraspinals, R scpaul, R lateral and anterior ribs at T4 level    ROM:  Active Shoulder ROM: WNL bilaterally but increases pain  L thoracic rotation: 75%  R thoracic rotation: 50%    Cervical ROM:  Flexion: 40 degrees  Extension: 15 degrees  L side bendin degrees  R side bending: 15 degrees  L rotation: 55 degrees  R rotation: 50 degrees    Mild hypomobility through thoracic spine with PA mobilizations     Strength:  R shoulder strength not assessed due to recent rotator cuff repair and pain in shoulder  L shoulder strength: 5/5    Sensation: B UE sensation intact    Assessment/Plan:   Pt presents with a diagnosis of thoracic back pain and chest pain and has decreased thoracic rotation, pain with UE movement, and tenderness over T4 spinous process that are limiting his ability to reach, lie flat, and perform functional activities.  Thoracic mobilizations were performed to help improve mobility and alignment.  Pt also performed thoracic mobility exercises and was provided with a HEP handout.  Pain was reduced to a 6/10 and not as sharp after treatment.     Goals:   LTG 1: The  patient will be independent in HEP in order to decrease pain and improve tolerance to functional activities.  STATUS: Met    Interventions:   Manual Therapy: supine thoracic HVT at T4/5, no cavitation; prone thoracic mobilizations at T3-7 and prone UPA's, grade 2-3    Therapeutic Exercises: sidelying trunk rotation stretch (3x20 seconds), seated thoracic extension (5x5 seconds), seated open book, foam roller pec stretch    Modalities: not performed   Outcome Measures     Row Name 04/28/23 1032          Optimal Instrument    Optimal Instrument Optimal - 3  -LR     Rolling Over 2  -LR     Moving - lying to sitting 2  -LR     Reaching 2  -LR     From the list, choose the 3 activities you would most like to be able to do without any difficulty Moving -lying to sitting;Rolling over;Reaching  -LR     Total Score Optimal - 3 4  -LR     Row Name 04/28/23 1032          Functional Assessment    Outcome Measure Options Optimal Instrument  -LR           User Key  (r) = Recorded By, (t) = Taken By, (c) = Cosigned By    Initials Name Provider Type    LR Mell Montgomery, PT Physical Therapist                 04/28/23 1032   PT Evaluation Complexity   History, PT Evaluation Complexity 1-2 personal factors and/or comorbidities   Examination of Body Systems (PT Eval Complexity) 1-2 elements   Clinical Presentation (PT Evaluation Complexity) stable   Clinical Decision Making (PT Evaluation Complexity) low complexity   Overall Complexity (PT Evaluation Complexity) low complexity      Time Calculation:    PT Charges     Row Name 04/28/23 1032             Time Calculation    PT Received On 04/28/23  -LR         Time Calculation- PT    Total Timed Code Minutes- PT 33 minute(s)  -LR         Timed Charges    94966 - PT Therapeutic Exercise Minutes 7  -LR      93991 - PT Manual Therapy Minutes 8  -LR         Untimed Charges    PT Eval/Re-eval Minutes 18  -LR         Total Minutes    Timed Charges Total Minutes 15  -LR      Untimed Charges  Total Minutes 18  -LR       Total Minutes 33  -LR            User Key  (r) = Recorded By, (t) = Taken By, (c) = Cosigned By    Initials Name Provider Type    LR Mell Montgomery, PT Physical Therapist              Therapy Charges for Today     Code Description Service Date Service Provider Modifiers Qty    [id number]  PT MANUAL THERAPY EA 15 MIN 4/28/2023 Mell Montgomery, PT GP 1    [id number] HC PT EVAL LOW COMPLEXITY 2 4/28/2023 Mell Montgomery, PT GP 1          PT G-Codes  Outcome Measure Options: Optimal Instrument       Mell Montgomery, PT  4/28/2023

## 2023-04-28 NOTE — ED PROVIDER NOTES
Time: 9:32 AM EDT  Date of encounter:  4/28/2023  Independent Historian/Clinical History and Information was obtained by:   Patient  Chief Complaint: shoulder pain, chest pain    History is limited by: N/A    History of Present Illness:  Patient is a 62 y.o. year old male who presents to the emergency department for evaluation of right shoulder blade pain.  This began this past Tuesday. Patient states he drove several hours and then did some yard work and thinks that might be why he is having this pain.  Patient states the scapula pain radiates to right anterior chest.  Patient states the pain is sharp and takes his breath away from him.  Patient states the pain is exacerbated by deep breathing or movement.  He states rest makes the pain go away.  He denies any fevers or chills.  He denies a cough.  Patient states that since he has a cardiac history he wanted to be checked out to be safe.  Patient reports he has been seen by Dr. Haris Clements.        History provided by:  Patient   used: No        Patient Care Team  Primary Care Provider: Kurt Arango DO    Past Medical History:     No Known Allergies  Past Medical History:   Diagnosis Date   • Coronary artery disease     BLOCKAGE 1 STENT PLACED 2000, SEE'S DR CLEMENTS, DENIED CP/SOB    • Heart disease     stent placed 2000   • HTN (hypertension)    • Hyperlipemia    • Rotator cuff syndrome 09/2022    When pulling back compound bow during hunting season   • Rotator cuff tear, right      Past Surgical History:   Procedure Laterality Date   • APPENDECTOMY     • COLONOSCOPY N/A 10/18/2021    Procedure: COLONOSCOPY with hot snare polypectomies, clips applied x2;  Surgeon: Haroldo Perez MD;  Location: [facility] ENDOSCOPY;  Service: Gastroenterology;  Laterality: N/A;  cecum polyp, transverse polyp   • CORONARY STENT PLACEMENT  2000    1 STENT PLACED   • EXCISION LESION Right 02/25/2022    Procedure: excision of subcutaneous mass from right arm;   Surgeon: Jeff Tomas MD;  Location: [facility] MAIN OR;  Service: General;  Laterality: Right;   • ROTATOR CUFF REPAIR Left    • SHOULDER ARTHROSCOPY W/ ROTATOR CUFF REPAIR Right 01/25/2023    Procedure: SHOULDER ARTHROSCOPY WITH ROTATOR CUFF REPAIR, SUBCROMIAL DECOMPRESSION,DISTAL CLAVICLE RESECTION;  Surgeon: Lisa James MD;  Location: [facility] OR [facility];  Service: Orthopedics;  Laterality: Right;   • SHOULDER SURGERY  01/25/2023    Right shoulder     Family History   Problem Relation Age of Onset   • Heart disease Mother    • Heart disease Father    • Cancer Father         Lung cancer   • Heart disease Sister    • Heart disease Brother    • Lung cancer Other    • Malig Hyperthermia Neg Hx        Home Medications:  Prior to Admission medications    Medication Sig Start Date End Date Taking? Authorizing Provider   aspirin 81 MG EC tablet Take  by mouth Daily. MAY HOLD FOR 7 DAYS PER DR. TERRAZAS. LAST DOSE 01/19/23 PER PATIENT    ProviderMessi MD B Complex-Biotin-FA (B Complete) tablet Take  by mouth.    Provider, MD Messi   HYDROcodone-acetaminophen (NORCO) 7.5-325 MG per tablet Take 1 tablet by mouth Every 4 (Four) Hours As Needed for Moderate Pain (Pain). 3/2/23   Lisa James MD   lisinopril (PRINIVIL,ZESTRIL) 20 MG tablet TAKE 1 TABLET DAILY 11/23/22   Haris Clements MD   loratadine (Claritin) 10 MG tablet Take 1 tablet by mouth 2 (Two) Times a Day As Needed (urticaria). 6/15/22   Kurt Arango DO Repatha SureClick solution auto-injector SureClick injection INJECT 1 ML (140 MG) UNDER THE SKIN EVERY 2 WEEKS IN THE ABDOMEN, THIGH OR OUTER AREA OF UPPER ARM (ROTATE SITES) 6/22/22   Sarah White APRN   rosuvastatin (CRESTOR) 20 MG tablet TAKE 1 TABLET DAILY 4/17/23   Haris Clements MD   sildenafil (VIAGRA) 100 MG tablet Take 1 tablet by mouth Daily As Needed.    Provider, MD Messi        Social History:   Social History     Tobacco Use   • Smoking status: Never   • Smokeless  "tobacco: Never   • Tobacco comments:     Never use   Vaping Use   • Vaping Use: Never used   Substance Use Topics   • Alcohol use: Yes     Comment: Social   • Drug use: Never         Review of Systems:  Review of Systems   Constitutional: Negative for chills and fever.   HENT: Negative for congestion, rhinorrhea and sore throat.    Eyes: Negative for photophobia.   Respiratory: Negative for apnea, cough, chest tightness and shortness of breath.    Cardiovascular: Positive for chest pain (right side). Negative for palpitations.   Gastrointestinal: Negative for abdominal pain, diarrhea, nausea and vomiting.   Endocrine: Negative.    Genitourinary: Negative for difficulty urinating and dysuria.   Musculoskeletal: Positive for arthralgias (right shoulder blade). Negative for back pain, joint swelling and myalgias.   Skin: Negative for color change and wound.   Allergic/Immunologic: Negative.    Neurological: Negative for seizures and headaches.   Psychiatric/Behavioral: Negative.    All other systems reviewed and are negative.       Physical Exam:  /99   Pulse 118   Temp 98.4 °F (36.9 °C)   Resp 20   Ht 177.8 cm (70\")   Wt 93.7 kg (206 lb 9.1 oz)   SpO2 94%   BMI 29.64 kg/m²     Physical Exam  Vitals and nursing note reviewed.   Constitutional:       General: He is awake.      Appearance: Normal appearance.   HENT:      Head: Normocephalic and atraumatic.      Nose: Nose normal.      Mouth/Throat:      Mouth: Mucous membranes are moist.   Eyes:      Extraocular Movements: Extraocular movements intact.      Pupils: Pupils are equal, round, and reactive to light.   Cardiovascular:      Rate and Rhythm: Tachycardia present. Rhythm irregular.      Heart sounds: Normal heart sounds.   Pulmonary:      Effort: Pulmonary effort is normal. No respiratory distress.      Breath sounds: Decreased breath sounds (bases) present. No wheezing, rhonchi or rales.   Chest:      Chest wall: Tenderness (severe, right sternal " border, right posterior along scapula border on right side) present.   Abdominal:      General: Bowel sounds are normal.      Palpations: Abdomen is soft.      Tenderness: There is no abdominal tenderness. There is no guarding or rebound.      Comments: No rigidity   Musculoskeletal:         General: No tenderness. Normal range of motion.      Cervical back: Normal range of motion and neck supple.      Right lower leg: No edema.      Left lower leg: No edema.   Skin:     General: Skin is warm and dry.      Coloration: Skin is not jaundiced.   Neurological:      General: No focal deficit present.      Mental Status: He is alert and oriented to person, place, and time. Mental status is at baseline.      Sensory: Sensation is intact.      Motor: Motor function is intact.      Coordination: Coordination is intact.   Psychiatric:         Attention and Perception: Attention and perception normal.         Mood and Affect: Mood and affect normal.         Speech: Speech normal.         Behavior: Behavior normal.         Judgment: Judgment normal.                  Procedures:  Procedures      Medical Decision Making:      Comorbidities that affect care:    Hyperlipidemia, Coronary Artery Disease, Hypertension    External Notes reviewed:    Previous Clinic Note: PCP office visit from 3/9/2023 was reviewed by me.      The following orders were placed and all results were independently analyzed by me:  Orders Placed This Encounter   Procedures   • XR Chest 1 View   • Parmele Draw   • High Sensitivity Troponin T   • Comprehensive Metabolic Panel   • Lipase   • BNP   • Magnesium   • CBC Auto Differential   • High Sensitivity Troponin T 2Hr   • Undress & Gown   • Continuous Pulse Oximetry   • PT Plan of Care Cert / Re-Cert   • ECG 12 Lead ED Triage Standing Order; Chest Pain   • CBC & Differential   • Green Top (Gel)   • Lavender Top   • Gold Top - SST   • Light Blue Top       Medications Given in the Emergency  Department:  Medications   ketorolac (TORADOL) injection 30 mg (30 mg Intravenous Given 4/28/23 1045)   metoprolol succinate XL (TOPROL-XL) 24 hr tablet 25 mg (25 mg Oral Given 4/28/23 1332)   apixaban (ELIQUIS) tablet 5 mg (5 mg Oral Given 4/28/23 1332)        ED Course:    ED Course as of 04/28/23 1818 Fri Apr 28, 2023   1806 EKG performed at 859 was interpreted by me to show atrial fibrillation with a rapid ventricular response rate of 136 bpm.  There is no discernible P waves.  QRS interval is normal.  Axis is at -71 degrees.  There is a left anterior fascicular block.  There is no acute ischemic ST or T wave change identified.  QT corrected was 463 ms. [TB]   1807 EKG performed at 1105 was interpreted by me to show atrial fibrillation with a rapid ventricular response rate of 122 bpm.  There is no discernible P waves.  QRS interval is normal.  Axis is at -63 degrees.  Patient has a left anterior fascicular block.  There is no acute ischemic ST or T wave change identified.  QT corrected was 454 ms.  This EKG was compared to prior EKG done today at 859 and shows no acute changes.  This EKG was however compared with the prior dated 8/3/2022 and shows atrial fibrillation to be new this is an acute rhythm change. [TB]   1816 MCV: 85.5 [TB]      ED Course User Index  [TB] Carl Corona DO     The patient was seen and evaluated in the ED by me.  The above history and physical examination was performed as documented.  Diagnostic data was obtained.  Results reviewed.  Patient's cardiac work-up was unremarkable.  Patient's pulmonary x-ray work-up was also negative.  The patient's history was consistent with that of a musculoskeletal chest wall pain.  I did consult our physical therapist who saw and evaluated the patient.'s manipulation was applied.  Patient was also treated with IV ketorolac.  Upon repeat evaluation after negative work-up the patient reports that his symptoms are improved between the medications and  physical therapy treatment.  Patient is stable for discharge home with continued outpatient treatment follow-up.  I did discuss all the findings with the patient and he verbalized understanding.    Labs:    Lab Results (last 24 hours)     Procedure Component Value Units Date/Time    High Sensitivity Troponin T [[id number]]  (Normal) Collected: 04/28/23 0906    Specimen: Blood Updated: 04/28/23 1003     HS Troponin T 13 ng/L     Narrative:      High Sensitive Troponin T Reference Range:  <10.0 ng/L- Negative Female for AMI  <15.0 ng/L- Negative Male for AMI  >=10 - Abnormal Female indicating possible myocardial injury.  >=15 - Abnormal Male indicating possible myocardial injury.   Clinicians would have to utilize clinical acumen, EKG, Troponin, and serial changes to determine if it is an Acute Myocardial Infarction or myocardial injury due to an underlying chronic condition.         CBC & Differential [[id number]]  (Abnormal) Collected: 04/28/23 0906    Specimen: Blood Updated: 04/28/23 0919    Narrative:      The following orders were created for panel order CBC & Differential.  Procedure                               Abnormality         Status                     ---------                               -----------         ------                     CBC Auto Differential[[id number]]        Abnormal            Final result                 Please view results for these tests on the individual orders.    Comprehensive Metabolic Panel [[id number]]  (Abnormal) Collected: 04/28/23 0906    Specimen: Blood Updated: 04/28/23 1003     Glucose 130 mg/dL      BUN 14 mg/dL      Creatinine 0.89 mg/dL      Sodium 135 mmol/L      Potassium 4.4 mmol/L      Chloride 99 mmol/L      CO2 23.6 mmol/L      Calcium 9.6 mg/dL      Total Protein 7.4 g/dL      Albumin 4.0 g/dL      ALT (SGPT) 28 U/L      AST (SGOT) 20 U/L      Alkaline Phosphatase 100 U/L      Total Bilirubin 0.4 mg/dL      Globulin 3.4 gm/dL      A/G Ratio 1.2 g/dL       BUN/Creatinine Ratio 15.7     Anion Gap 12.4 mmol/L      eGFR 97.5 mL/min/1.73     Narrative:      GFR Normal >60  Chronic Kidney Disease <60  Kidney Failure <15      Lipase [[id number]]  (Normal) Collected: 04/28/23 0906    Specimen: Blood Updated: 04/28/23 1003     Lipase 42 U/L     BNP [[id number]]  (Normal) Collected: 04/28/23 0906    Specimen: Blood Updated: 04/28/23 1001     proBNP 381.5 pg/mL     Narrative:      Among patients with dyspnea, NT-proBNP is highly sensitive for the detection of acute congestive heart failure. In addition NT-proBNP of <300 pg/ml effectively rules out acute congestive heart failure with 99% negative predictive value.      Magnesium [[id number]]  (Normal) Collected: 04/28/23 0906    Specimen: Blood Updated: 04/28/23 1003     Magnesium 2.0 mg/dL     CBC Auto Differential [[id number]]  (Abnormal) Collected: 04/28/23 0906    Specimen: Blood Updated: 04/28/23 0919     WBC 10.60 10*3/mm3      RBC 5.74 10*6/mm3      Hemoglobin 16.9 g/dL      Hematocrit 49.1 %      MCV 85.5 fL      MCH 29.4 pg      MCHC 34.4 g/dL      RDW 12.1 %      RDW-SD 37.3 fl      MPV 8.7 fL      Platelets 206 10*3/mm3      Neutrophil % 79.4 %      Lymphocyte % 10.0 %      Monocyte % 9.2 %      Eosinophil % 0.6 %      Basophil % 0.3 %      Immature Grans % 0.5 %      Neutrophils, Absolute 8.43 10*3/mm3      Lymphocytes, Absolute 1.06 10*3/mm3      Monocytes, Absolute 0.97 10*3/mm3      Eosinophils, Absolute 0.06 10*3/mm3      Basophils, Absolute 0.03 10*3/mm3      Immature Grans, Absolute 0.05 10*3/mm3      nRBC 0.0 /100 WBC     High Sensitivity Troponin T 2Hr [[id number]]  (Normal) Collected: 04/28/23 1142    Specimen: Blood Updated: 04/28/23 1218     HS Troponin T 13 ng/L      Troponin T Delta 0 ng/L     Narrative:      High Sensitive Troponin T Reference Range:  <10.0 ng/L- Negative Female for AMI  <15.0 ng/L- Negative Male for AMI  >=10 - Abnormal Female indicating possible myocardial injury.  >=15 - Abnormal Male  indicating possible myocardial injury.   Clinicians would have to utilize clinical acumen, EKG, Troponin, and serial changes to determine if it is an Acute Myocardial Infarction or myocardial injury due to an underlying chronic condition.                Imaging:    XR Chest 1 View    Result Date: 4/28/2023  PROCEDURE: XR CHEST 1 VW  COMPARISON: None  INDICATIONS: CHEST PAIN  FINDINGS:  No consolidations or pleural effusions are observed. The cardiac silhouette is within normal limits. The mediastinum is unremarkable. No definitive acute osseous abnormalities are seen on this single view.        1. No evidence for acute cardiopulmonary process.       HUEY WATKINS MD       Electronically Signed and Approved By: HUEY WATKINS MD on 4/28/2023 at 9:28                 Differential Diagnosis and Discussion:    Chest Pain:  Based on the patient's signs and symptoms, I considered aortic dissection, myocardial infaction, pulmonary embolism, cardiac tamponade, pericarditis, pneumothorax, musculoskeletal chest pain and other differential diagnosis as an etiology of the patient's chest pain.   Extremity Pain: Differential diagnosis includes but is not limited to soft tissue sprain, tendonitis, tendon injury, dislocation, fracture, deep vein thrombosis, arterial insufficiency, osteoarthritis, bursitis, and ligamentous damage.    All labs were reviewed and interpreted by me.  All X-rays impressions were independently interpreted by me.  EKG was interpreted by me.    [facility]         Patient Care Considerations:    CT CHEST: I considered ordering a CT scan of the chest, however The patient's physical finding produced a positive etiology of the pain.      Consultants/Shared Management Plan:    Consultant: I have discussed the case with Dr. Haris Clements who states Recommend start patient on a beta-blocker and Eliquis and he will see him in the office this coming week for continued treatment.    Social Determinants of Health:    Patient is  independent, reliable, and has access to care.       Disposition and Care Coordination:    Discharged: I considered escalation of care by admitting this patient for observation, however the patient has improved and is suitable and  stable for discharge.    I have explained the patient´s condition, diagnoses and treatment plan based on the information available to me at this time. I have answered questions and addressed any concerns. The patient has a good  understanding of the patient´s diagnosis, condition, and treatment plan as can be expected at this point. The vital signs have been stable. The patient´s condition is stable and appropriate for discharge from the emergency department.      The patient will pursue further outpatient evaluation with the primary care physician or other designated or consulting physician as outlined in the discharge instructions. They are agreeable to this plan of care and follow-up instructions have been explained in detail. The patient has received these instructions in written format and have expressed an understanding of the discharge instructions. The patient is aware that any significant change in condition or worsening of symptoms should prompt an immediate return to this or the closest emergency department or call to 911.  I have explained discharge medications and the need for follow up with the patient/caretakers. This was also printed in the discharge instructions. Patient was discharged with the following medications and follow up:      Medication List      New Prescriptions    apixaban 5 MG tablet tablet  Commonly known as: ELIQUIS  Take 1 tablet by mouth 2 (Two) Times a Day.     diclofenac 75 MG EC tablet  Commonly known as: VOLTAREN  Take 1 tablet by mouth 2 (Two) Times a Day As Needed (Pain).     metoprolol succinate XL 25 MG 24 hr tablet  Commonly known as: TOPROL-XL  Take 1 tablet by mouth Daily.           Where to Get Your Medications      These medications were  sent to [id number] - [facility] [place] - [address] - [phone]  - [phone] FX  [address], [place] [address]    Phone: [phone]   · apixaban 5 MG tablet tablet  · diclofenac 75 MG EC tablet  · metoprolol succinate XL 25 MG 24 hr tablet        Follow-up with Dr. Haris Clements, cardiologist, next week as scheduled.        Kurt Arango,   [address]  [address]  [place] [address]  [phone]      As needed       Final diagnoses:   Musculoskeletal chest pain   New onset a-fib        ED Disposition     ED Disposition   Discharge    Condition   Stable    Comment   --             This medical record created using voice recognition software.        Documentation assistance provided by Brandy Beaver acting as scribe for No att. providers found. Information recorded by the scribe was done at my direction and has been verified and validated by me.          Brandy Beaver  04/28/23 0945       Carl Corona DO  04/28/23 9611

## 2023-04-28 NOTE — PATIENT INSTRUCTIONS
Patient with complaint of intermittent chest pain and SOB. Vitals are stable in office. Did discuss risks with patient. Refuses transport to ER. He verbalizes understanding and agreement and will proceed immediately to ER. Discussed with Dr. James, who is in agreement with this plan.   
No

## 2023-05-04 ENCOUNTER — OFFICE VISIT (OUTPATIENT)
Dept: CARDIOLOGY | Facility: CLINIC | Age: 63
End: 2023-05-04
Payer: OTHER GOVERNMENT

## 2023-05-04 VITALS
HEART RATE: 83 BPM | HEIGHT: 70 IN | SYSTOLIC BLOOD PRESSURE: 125 MMHG | BODY MASS INDEX: 29.49 KG/M2 | DIASTOLIC BLOOD PRESSURE: 87 MMHG | WEIGHT: 206 LBS

## 2023-05-04 DIAGNOSIS — I48.0 PAROXYSMAL ATRIAL FIBRILLATION: Primary | ICD-10-CM

## 2023-05-04 RX ORDER — METOPROLOL SUCCINATE 25 MG/1
25 TABLET, EXTENDED RELEASE ORAL DAILY
Qty: 90 TABLET | Refills: 3 | Status: SHIPPED | OUTPATIENT
Start: 2023-05-04

## 2023-05-04 NOTE — PROGRESS NOTES
"Chief Complaint  Atrial Fibrillation, Coronary Artery Disease, Hypertension, and Hospital Follow Up Visit (Echo results )    Subjective            History of Present Illness  Carlos Manuel Sanchez is a 62-year-old white/ male patient who presents to the office today for follow-up for new onset atrial fibrillation while in the ER on 4/28/2023.  He has CAD with prior stenting, hypertension, and hyperlipidemia.  He was started on metoprolol and Eliquis upon discharge from the emergency room.  He reports that he is tolerating these medications well.  He admits to some chest tightness which she attributes to indigestion because he is relieved by belching.  He has a mild chronic cough which is attributed to his lisinopril.  He has lightheadedness occasionally with position changes and reports that the episodes are typically brief in nature.  He admits that he can feel \"flutters\" from time to time in his chest and now realizes that it was the atrial fibrillation most likely.  He denies any shortness of breath or edema.    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   • Rotator cuff tear, right          ALLERGY  No Known Allergies       SURGICALHX  Past Surgical History:   Procedure Laterality Date   • APPENDECTOMY     • COLONOSCOPY N/A 10/18/2021    Procedure: COLONOSCOPY with hot snare polypectomies, clips applied x2;  Surgeon: Haroldo Perez MD;  Location: [facility] ENDOSCOPY;  Service: Gastroenterology;  Laterality: N/A;  cecum polyp, transverse polyp   • CORONARY STENT PLACEMENT  2000    1 STENT PLACED   • EXCISION LESION Right 02/25/2022    Procedure: excision of subcutaneous mass from right arm;  Surgeon: Jeff Tomas MD;  Location: [facility]" OR;  Service: General;  Laterality: Right;   • ROTATOR CUFF REPAIR Left    • SHOULDER ARTHROSCOPY W/ ROTATOR CUFF REPAIR Right 01/25/2023    Procedure: SHOULDER ARTHROSCOPY WITH ROTATOR CUFF REPAIR, SUBCROMIAL DECOMPRESSION,DISTAL CLAVICLE RESECTION;  Surgeon: Lisa James MD;  Location: [facility];  Service: Orthopedics;  Laterality: Right;   • SHOULDER SURGERY  01/25/2023    Right shoulder          SOC  Social History     Socioeconomic History   • Marital status:    Tobacco Use   • Smoking status: Never   • Smokeless tobacco: Never   • Tobacco comments:     Never use   Vaping Use   • Vaping Use: Never used   Substance and Sexual Activity   • Alcohol use: Yes     Comment: Social   • Drug use: Never   • Sexual activity: Yes     Partners: Female     Birth control/protection: Vasectomy         FAMHX  Family History   Problem Relation Age of Onset   • Heart disease Mother    • Heart disease Father    • Cancer Father         Lung cancer   • Heart disease Sister    • Heart disease Brother    • Lung cancer Other    • Malig Hyperthermia Neg Hx           MEDSIGONLY  Current Outpatient Medications on File Prior to Visit   Medication Sig   • apixaban (ELIQUIS) 5 MG tablet tablet Take 1 tablet by mouth 2 (Two) Times a Day.   • aspirin 81 MG EC tablet Take  by mouth Daily. MAY HOLD FOR 7 DAYS PER DR. TERRAZAS. LAST DOSE 01/19/23 PER PATIENT   • B Complex-Biotin-FA (B Complete) tablet Take  by mouth.   • diclofenac (VOLTAREN) 75 MG EC tablet Take 1 tablet by mouth 2 (Two) Times a Day As Needed (Pain).   • lisinopril (PRINIVIL,ZESTRIL) 20 MG tablet TAKE 1 TABLET DAILY   • loratadine (Claritin) 10 MG tablet Take 1 tablet by mouth 2 (Two) Times a Day As Needed (urticaria).   • metoprolol succinate XL (TOPROL-XL) 25 MG 24 hr tablet Take 1 tablet by mouth Daily.   • Repatha SureClick solution auto-injector SureClick injection INJECT 1 ML (140 MG) UNDER THE SKIN EVERY 2 WEEKS IN THE ABDOMEN, THIGH OR OUTER AREA OF UPPER ARM  "(ROTATE SITES)   • rosuvastatin (CRESTOR) 20 MG tablet TAKE 1 TABLET DAILY   • sildenafil (VIAGRA) 100 MG tablet Take 1 tablet by mouth Daily As Needed.   • [DISCONTINUED] HYDROcodone-acetaminophen (NORCO) 7.5-325 MG per tablet Take 1 tablet by mouth Every 4 (Four) Hours As Needed for Moderate Pain (Pain).     No current facility-administered medications on file prior to visit.         Objective   /87   Pulse 83   Ht 177.8 cm (70\")   Wt 93.4 kg (206 lb)   BMI 29.56 kg/m²       Physical Exam  HENT:      Head: Normocephalic.   Neck:      Vascular: No carotid bruit.   Cardiovascular:      Rate and Rhythm: Normal rate and regular rhythm.      Pulses: Normal pulses.      Heart sounds: Normal heart sounds. No murmur heard.  Pulmonary:      Effort: Pulmonary effort is normal.      Breath sounds: Normal breath sounds.   Musculoskeletal:      Cervical back: Neck supple.      Right lower leg: No edema.      Left lower leg: No edema.   Skin:     General: Skin is dry.   Neurological:      Mental Status: He is alert and oriented to person, place, and time.   Psychiatric:         Behavior: Behavior normal.       Result Review :   The following data was reviewed by: ENRIQUE Lees on 05/04/2023:  proBNP   Date Value Ref Range Status   04/28/2023 381.5 0.0 - 900.0 pg/mL Final     CMP        4/28/2023    09:06   CMP   Glucose 130     BUN 14     Creatinine 0.89     EGFR 97.5     Sodium 135     Potassium 4.4     Chloride 99     Calcium 9.6     Total Protein 7.4     Albumin 4.0     Globulin 3.4     Total Bilirubin 0.4     Alkaline Phosphatase 100     AST (SGOT) 20     ALT (SGPT) 28     Albumin/Globulin Ratio 1.2     BUN/Creatinine Ratio 15.7     Anion Gap 12.4       CBC w/diff        4/28/2023    09:06   CBC w/Diff   WBC 10.60     RBC 5.74     Hemoglobin 16.9     Hematocrit 49.1     MCV 85.5     MCH 29.4     MCHC 34.4     RDW 12.1     Platelets 206     Neutrophil Rel % 79.4     Immature Granulocyte Rel % 0.5   "   Lymphocyte Rel % 10.0     Monocyte Rel % 9.2     Eosinophil Rel % 0.6     Basophil Rel % 0.3        No results found for: TSH   No results found for: FREET4   No results found for: DDIMERQUANT  Magnesium   Date Value Ref Range Status   04/28/2023 2.0 1.6 - 2.4 mg/dL Final      No results found for: DIGOXIN   Lab Results   Component Value Date    TROPONINT 13 04/28/2023           Lipid Panel        7/14/2022    11:46   Lipid Panel   Total Cholesterol 141     Triglycerides 59     HDL Cholesterol 76     VLDL Cholesterol 12     LDL Cholesterol  53     LDL/HDL Ratio 0.70         Results for orders placed in visit on 05/03/23    Adult Transthoracic Echo Complete W/ Cont if Necessary Per Protocol    Interpretation Summary  •  The study is technically difficult for diagnosis.  •  Left ventricular systolic function is normal. Calculated left ventricular EF = 56%  •  Left ventricular diastolic function was normal.  •  The left atrial cavity is moderately dilated.  •  The right atrial cavity is mildly  dilated.  •  There is mild calcification of the aortic valve.  •  Estimated right ventricular systolic pressure from tricuspid regurgitation is normal (<35 mmHg).  •  The left atrial cavity is moderately dilated.    [id number]-GIRo Score: 2        Assessment and Plan    Diagnoses and all orders for this visit:    1. Paroxysmal atrial fibrillation (Primary)  Echocardiogram results were discussed with patient which was performed yesterday.  Symptomatically stable at this time, will obtain 14-day Holter monitor to assess frequency of atrial fibrillation and to assess for any other type of arrhythmia.  Continue metoprolol 25 mg daily.  Continue Eliquis for CVA prevention.  -     Holter Monitor - 72 Hour Up To 15 Days; Future    Other orders  -     apixaban (ELIQUIS) 5 MG tablet tablet; Take 1 tablet by mouth 2 (Two) Times a Day.  Dispense: 180 tablet; Refill: 3  -     metoprolol succinate XL (TOPROL-XL) 25 MG 24 hr tablet; Take 1  tablet by mouth Daily.  Dispense: 90 tablet; Refill: 3            Follow Up   Return for Follow up with Dr Clements as scheduled.    Patient was given instructions and counseling regarding his condition or for health maintenance advice. Please see specific information pulled into the AVS if appropriate.     Carlos Manuel Sanchez  reports that he has never smoked. He has never used smokeless tobacco..         Sarah White, APRN  05/04/23  14:10 EDT    Dictated Utilizing Dragon Dictation

## 2023-05-09 RX ORDER — EVOLOCUMAB 140 MG/ML
INJECTION, SOLUTION SUBCUTANEOUS
Qty: 6 ML | Refills: 3 | Status: SHIPPED | OUTPATIENT
Start: 2023-05-09

## 2023-05-16 LAB
QT INTERVAL: 308 MS
QT INTERVAL: 318 MS

## 2023-05-23 ENCOUNTER — TELEPHONE (OUTPATIENT)
Dept: CARDIOLOGY | Facility: CLINIC | Age: 63
End: 2023-05-23
Payer: OTHER GOVERNMENT

## 2023-05-23 NOTE — TELEPHONE ENCOUNTER
----- Message from ENRIQUE Sanderson sent at 5/22/2023  3:15 PM EDT -----  Notify pt holter result: Baseline atrial fibrillation with average heart rate of 111  Maximal heart rate 208/Minimum heart rate 54  PVCs 993 (less than 1% of total beats) can be felt as palpitations, limit caffeine intake  Patient triggered events 19 total 17 were A-fib into A-fib with ectopy  Increase metoprolol dose from 25 mg daily to 50 mg daily, continue eliquis  Follow up as scheduled

## 2023-05-24 RX ORDER — METOPROLOL SUCCINATE 50 MG/1
50 TABLET, EXTENDED RELEASE ORAL DAILY
Qty: 90 TABLET | Refills: 3 | Status: SHIPPED | OUTPATIENT
Start: 2023-05-24

## 2023-07-17 PROBLEM — E11.65 TYPE 2 DIABETES MELLITUS WITH HYPERGLYCEMIA, WITHOUT LONG-TERM CURRENT USE OF INSULIN: Status: ACTIVE | Noted: 2023-07-17

## 2023-07-28 ENCOUNTER — TELEPHONE (OUTPATIENT)
Dept: FAMILY MEDICINE CLINIC | Facility: CLINIC | Age: 63
End: 2023-07-28

## 2023-07-28 NOTE — TELEPHONE ENCOUNTER
Caller: Lashonda Sanchez    Relationship: Self    Best call back number: [phone]    What is the best time to reach you: IN MORNING     Who are you requesting to speak with (clinical staff, provider,  specific staff member): CLINICAL     Do you know the name of the person who called: LASHONDA     What was the call regarding: PATIENT RECEIVED A LETTER FROM OUR OFFICE STATING HE HAS NOT COMPLETED ORDERS TEST OR PROCEDURES . PATIENT SAID HE HAS COMPLETED LAB WORK AND WENT OVER RESULTS WITH PROVIDER     PLEASE ADVISE     Is it okay if the provider responds through MyChart: YES

## 2023-08-01 ENCOUNTER — TELEPHONE (OUTPATIENT)
Dept: FAMILY MEDICINE CLINIC | Facility: CLINIC | Age: 63
End: 2023-08-01
Payer: OTHER GOVERNMENT

## 2023-08-18 ENCOUNTER — TELEPHONE (OUTPATIENT)
Dept: CARDIOLOGY | Facility: CLINIC | Age: 63
End: 2023-08-18
Payer: OTHER GOVERNMENT

## 2023-08-18 NOTE — TELEPHONE ENCOUNTER
[facility] received a fax that requires your attention. The document has been indexed to the patient's chart for your review.      Reason for sending: EXTERNAL MEDICAL RECORD NOTIFICATION     Documents Description: JENNA PAPERWORK REPATHA-8.17.23    Name of Sender: [employer]     Date Indexed: 8.17.23

## 2023-08-18 NOTE — TELEPHONE ENCOUNTER
REPATHA PA RENEWAL STARTED    EUGENIE SR [phone].  DID VERBAL PA RENEWAL OVER THE PHONE.    PA # [id number]    SIMA APPROVED 07/19/23-08/17/24    PT NOTIFIED

## 2023-08-31 ENCOUNTER — PREP FOR SURGERY (OUTPATIENT)
Dept: OTHER | Facility: HOSPITAL | Age: 63
End: 2023-08-31
Payer: OTHER GOVERNMENT

## 2023-08-31 ENCOUNTER — OFFICE VISIT (OUTPATIENT)
Dept: CARDIOLOGY | Facility: CLINIC | Age: 63
End: 2023-08-31
Payer: OTHER GOVERNMENT

## 2023-08-31 VITALS
HEART RATE: 76 BPM | BODY MASS INDEX: 29.15 KG/M2 | HEIGHT: 70 IN | DIASTOLIC BLOOD PRESSURE: 77 MMHG | WEIGHT: 203.6 LBS | SYSTOLIC BLOOD PRESSURE: 131 MMHG

## 2023-08-31 DIAGNOSIS — Z98.61 CAD S/P PERCUTANEOUS CORONARY ANGIOPLASTY: ICD-10-CM

## 2023-08-31 DIAGNOSIS — I25.10 CAD S/P PERCUTANEOUS CORONARY ANGIOPLASTY: ICD-10-CM

## 2023-08-31 DIAGNOSIS — I48.0 PAROXYSMAL ATRIAL FIBRILLATION: Primary | ICD-10-CM

## 2023-08-31 DIAGNOSIS — I48.19 ATRIAL FIBRILLATION, PERSISTENT: Primary | ICD-10-CM

## 2023-08-31 DIAGNOSIS — E78.5 HYPERLIPIDEMIA LDL GOAL <70: ICD-10-CM

## 2023-08-31 PROCEDURE — 99214 OFFICE O/P EST MOD 30 MIN: CPT | Performed by: INTERNAL MEDICINE

## 2023-08-31 RX ORDER — SODIUM CHLORIDE 0.9 % (FLUSH) 0.9 %
3 SYRINGE (ML) INJECTION EVERY 12 HOURS SCHEDULED
OUTPATIENT
Start: 2023-08-31

## 2023-08-31 RX ORDER — SODIUM CHLORIDE 9 MG/ML
40 INJECTION, SOLUTION INTRAVENOUS AS NEEDED
OUTPATIENT
Start: 2023-08-31

## 2023-08-31 RX ORDER — SODIUM CHLORIDE 0.9 % (FLUSH) 0.9 %
10 SYRINGE (ML) INJECTION AS NEEDED
OUTPATIENT
Start: 2023-08-31

## 2023-08-31 NOTE — PROGRESS NOTES
Chief Complaint  Follow-up (6 month f/u), Hypertension, and Atrial Fibrillation    Subjective    Patient has been doing well symptomatically although he has noticed sometimes his heart rate get up in the 150s with activity he still exercises regularly doing 3 miles per day and is feeling good from a breathing capacity standpoint.  He has not had any syncope or presyncopal episodes and no anginal chest discomfort issues  Past Medical History:   Diagnosis Date    Coronary artery disease     BLOCKAGE 1 STENT PLACED 2000, SEE'S DR WARD, DENIED CP/SOB     Heart disease     stent placed 2000    HTN (hypertension)     Hyperlipemia     Rotator cuff syndrome 09/2022    When pulling back compound bow during [employer] season    Rotator cuff tear, right          Current Outpatient Medications:     apixaban (ELIQUIS) 5 MG tablet tablet, Take 1 tablet by mouth 2 (Two) Times a Day., Disp: 180 tablet, Rfl: 3    aspirin 81 MG EC tablet, Take  by mouth Daily. MAY HOLD FOR 7 DAYS PER DR. TERRAZAS. LAST DOSE 01/19/23 PER PATIENT, Disp: , Rfl:     B Complex-Biotin-FA (B Complete) tablet, Take  by mouth., Disp: , Rfl:     lisinopril (PRINIVIL,ZESTRIL) 20 MG tablet, TAKE 1 TABLET DAILY, Disp: 90 tablet, Rfl: 3    loratadine (Claritin) 10 MG tablet, Take 1 tablet by mouth 2 (Two) Times a Day As Needed (urticaria)., Disp: 180 tablet, Rfl: 3    metoprolol succinate XL (TOPROL-XL) 50 MG 24 hr tablet, Take 1 tablet PO daily. May take extra 1/2 tablet daily prn if HR is greater than 110, Disp: 90 tablet, Rfl: 3    Repatha SureClick solution auto-injector SureClick injection, INJECT 1 ML (140 MG) UNDER THE SKIN EVERY 2 WEEKS IN THE ABDOMEN, THIGH OR OUTER AREA OF UPPER ARM (ROTATE SITES), Disp: 6 mL, Rfl: 3    rosuvastatin (CRESTOR) 20 MG tablet, TAKE 1 TABLET DAILY, Disp: 90 tablet, Rfl: 3    sildenafil (VIAGRA) 100 MG tablet, Take 1 tablet by mouth Daily As Needed., Disp: , Rfl:     There are no discontinued medications.  No Known Allergies  "    Social History     Tobacco Use    Smoking status: Never    Smokeless tobacco: Never    Tobacco comments:     Never use   Vaping Use    Vaping Use: Never used   Substance Use Topics    Alcohol use: Yes     Comment: Social    Drug use: Never       Family History   Problem Relation Age of Onset    Heart disease Mother     Heart disease Father     Cancer Father         Lung cancer    Heart disease Sister     Heart disease Brother     Lung cancer Other     Malig Hyperthermia Neg Hx         Objective     /77 (BP Location: Left arm, Patient Position: Sitting)   Pulse 76   Ht 177.8 cm (70\")   Wt 92.4 kg (203 lb 9.6 oz) Comment: Previous weight - 208  BMI 29.21 kg/mý       Physical Exam    General Appearance:   no acute distress  Alert and oriented x3  HENT:   lips not cyanotic  Atraumatic  Neck:  No jvd   supple  Respiratory:  no respiratory distress  normal breath sounds  no rales  Cardiovascular:  Irregularly irregular  no S3, no S4   no murmur  no rub  Extremities  No cyanosis  lower extremity edema: none    Skin:   warm, dry  No rashes      Result Review :     proBNP   Date Value Ref Range Status   04/28/2023 381.5 0.0 - 900.0 pg/mL Final     CMP          4/28/2023    09:06 7/11/2023    08:33   CMP   Glucose 130  122    BUN 14  13    Creatinine 0.89  0.98    EGFR 97.5  87.7    Sodium 135  138    Potassium 4.4  4.5    Chloride 99  104    Calcium 9.6  9.7    Total Protein 7.4  6.9    Albumin 4.0  4.1    Globulin 3.4  2.8    Total Bilirubin 0.4  0.6    Alkaline Phosphatase 100  83    AST (SGOT) 20  26    ALT (SGPT) 28  38    Albumin/Globulin Ratio 1.2  1.5    BUN/Creatinine Ratio 15.7  13.3    Anion Gap 12.4  9.1      CBC w/diff          4/28/2023    09:06 7/11/2023    08:33   CBC w/Diff   WBC 10.60  5.89    RBC 5.74  5.75    Hemoglobin 16.9  16.6    Hematocrit 49.1  49.6    MCV 85.5  86.3    MCH 29.4  28.9    MCHC 34.4  33.5    RDW 12.1  12.3    Platelets 206  193    Neutrophil Rel % 79.4  66.4    Immature " Granulocyte Rel % 0.5  0.5    Lymphocyte Rel % 10.0  19.0    Monocyte Rel % 9.2  12.1    Eosinophil Rel % 0.6  1.7    Basophil Rel % 0.3  0.3       No results found for: TSH   No results found for: FREET4   No results found for: DDIMERQUANT  Magnesium   Date Value Ref Range Status   04/28/2023 2.0 1.6 - 2.4 mg/dL Final      No results found for: DIGOXIN   Lab Results   Component Value Date    TROPONINT 13 04/28/2023           Lipid Panel          7/11/2023    08:33   Lipid Panel   Total Cholesterol 142    Triglycerides 58    HDL Cholesterol 71    VLDL Cholesterol 12    LDL Cholesterol  59    LDL/HDL Ratio 0.84      No results found for: POCTROP    Results for orders placed in visit on 05/03/23    Adult Transthoracic Echo Complete W/ Cont if Necessary Per Protocol    Interpretation Summary    The study is technically difficult for diagnosis.    Left ventricular systolic function is normal. Calculated left ventricular EF = 56%    Left ventricular diastolic function was normal.    The left atrial cavity is moderately dilated.    The right atrial cavity is mildly  dilated.    There is mild calcification of the aortic valve.    Estimated right ventricular systolic pressure from tricuspid regurgitation is normal (<35 mmHg).    The left atrial cavity is moderately dilated.   Holter 5.23  Baseline atrial fibrillation with average heart rate of 111  Maximal heart rate 208  Minimum heart rate 54  PVCs 993  Patient triggered events 19 total 17 were A-fib into A-fib with ectopy  Patient with persistent atrial fibrillation with an average elevated heart rate and RVR episodes                 Diagnoses and all orders for this visit:    1. Atrial fibrillation, persistent (Primary)  Assessment & Plan:  Patient with persistent atrial fibrillation doing fairly well symptom wise however his monitor did show an increased average heart rate issue I discussed with him attempted cardioversion to see if he would feel any better  symptomatically in the future.  Agreed to a trial of cardioversion to help better decide about rate versus rhythm strategy.  Patient is on Eliquis continue this as well as his metoprolol 50 daily for rate control and we will plan on DC cardioversion next week.  Discussed risk benefits alternatives patient was.      2. CAD S/P percutaneous coronary angioplasty  Assessment & Plan:  Patient with no anginal discomfort issues continue chronic Aspir 81 daily      3. Hyperlipidemia LDL goal <70  Assessment & Plan:  LDL is at goal continue with Repatha injection 140 every 2 weeks and Crestor 20 nightly              Follow Up     Return in about 6 months (around 2/29/2024) for EKG with F/U.          Patient was given instructions and counseling regarding his condition or for health maintenance advice. Please see specific information pulled into the AVS if appropriate.

## 2023-08-31 NOTE — ASSESSMENT & PLAN NOTE
Patient with persistent atrial fibrillation doing fairly well symptom wise however his monitor did show an increased average heart rate issue I discussed with him attempted cardioversion to see if he would feel any better symptomatically in the future.  Agreed to a trial of cardioversion to help better decide about rate versus rhythm strategy.  Patient is on Eliquis continue this as well as his metoprolol 50 daily for rate control and we will plan on DC cardioversion next week.  Discussed risk benefits alternatives patient was.   Please follow-up with a cardiologist.    Cardiology  Dr. Navya Rosenthal, Dr. Otoniel Allen, Dr. Elio Soto, Lissette Duarte CNP, Kait Ascencio CNP, Tamra Gacria [phone]  Dr. Jonah Krishnamurthy [phone]  Dr. Kiko Lai [phone]    Please return or seek medical attention if symptoms persist, change, worsen, or return. For emergencies, call 9-1-1 or go to the nearest Emergency Room.    Please return for a follow-up appointment in 3 months, earlier if any question or concern.     For assistance with scheduling referrals or consultations, please call [phone]. For laboratory, radiology, and other tests, please call [phone] ([phone] for pediatrics). Please review prescription inserts and published information for possible adverse effects. Return after testing or consultation to review results and recommendations, if symptoms persist, change, worsen, or return, or if you have any question or concern. For non-emergencies, you may call the office. For emergencies, call 9-1-1 or go to the nearest Emergency Department. Please schedule additional appointment(s) to address concern(s) not addressed today.    In general, results will not be released or discussed over the telephone. Results of tests done through [facility] are released via  [employer]:  [email or web address]    Until we complete our transition to the new system, additional information can be found at [email or web address] or on your Android or iOS (iPhone, iPad) device using the [employer] kiki available free of charge in your device's kiki store.

## 2023-09-08 ENCOUNTER — TELEPHONE (OUTPATIENT)
Dept: CARDIOLOGY | Facility: CLINIC | Age: 63
End: 2023-09-08
Payer: OTHER GOVERNMENT

## 2023-09-08 NOTE — TELEPHONE ENCOUNTER
I spoke to patient and gave an arrival time of 7:00 on 09/12/23 for [facility]IS CARLOS. Patient was instructed to have a  for the day of the procedure and to arrive at the main entrance/registration area. Patient was instructed to continue all medications as usual. Patient was instructed to be NPO after midnight with sips of water as needed. Patient is agreeable with no other questions or concerns.

## 2023-09-13 ENCOUNTER — CLINICAL SUPPORT (OUTPATIENT)
Dept: FAMILY MEDICINE CLINIC | Facility: CLINIC | Age: 63
End: 2023-09-13
Payer: OTHER GOVERNMENT

## 2023-09-13 DIAGNOSIS — Z23 NEED FOR INFLUENZA VACCINATION: Primary | ICD-10-CM

## 2023-09-13 PROCEDURE — 90686 IIV4 VACC NO PRSV 0.5 ML IM: CPT | Performed by: FAMILY MEDICINE

## 2023-09-13 PROCEDURE — 90471 IMMUNIZATION ADMIN: CPT | Performed by: FAMILY MEDICINE

## 2023-10-12 NOTE — TELEPHONE ENCOUNTER
Message discussed with patient - see 10/12/23 results encounter. Gastric emptying scheduled 10/19/23.   Phone call placed to Dr Clements's office Spoke to providers MA who consulted with the provider to clarify the lab needed by him. Provider stated that the lipid would be deleted and the ones that Dr Arango had obtained could be sent over. The Hepatic Function test to be done in am when patient comes for Covid booster. Patient notified by manager with voiced understanding.

## 2023-11-15 ENCOUNTER — OFFICE VISIT (OUTPATIENT)
Dept: FAMILY MEDICINE CLINIC | Facility: CLINIC | Age: 63
End: 2023-11-15
Payer: OTHER GOVERNMENT

## 2023-11-15 VITALS
BODY MASS INDEX: 29.41 KG/M2 | HEART RATE: 82 BPM | DIASTOLIC BLOOD PRESSURE: 60 MMHG | WEIGHT: 205.4 LBS | OXYGEN SATURATION: 98 % | TEMPERATURE: 97.3 F | SYSTOLIC BLOOD PRESSURE: 100 MMHG | HEIGHT: 70 IN

## 2023-11-15 DIAGNOSIS — M25.562 ACUTE PAIN OF LEFT KNEE: Primary | ICD-10-CM

## 2023-11-15 RX ORDER — METHYLPREDNISOLONE ACETATE 40 MG/ML
40 INJECTION, SUSPENSION INTRA-ARTICULAR; INTRALESIONAL; INTRAMUSCULAR; SOFT TISSUE ONCE
Status: COMPLETED | OUTPATIENT
Start: 2023-11-15 | End: 2023-11-15

## 2023-11-15 RX ADMIN — METHYLPREDNISOLONE ACETATE 40 MG: 40 INJECTION, SUSPENSION INTRA-ARTICULAR; INTRALESIONAL; INTRAMUSCULAR; SOFT TISSUE at 11:59

## 2023-11-15 NOTE — PROGRESS NOTES
Chief Complaint  Chief Complaint   Patient presents with    Knee Pain     left       HPI:  Carlos Manuel Sanchez presents to [facility]    63-year-old Carlos Manuel Sanchez presents today with complaints of left-sided knee pain that started approximately a week ago.  Patient states that he has been walking more and has been sitting in his stool while hunting.  States swelling usually goes down in the morning when he gets up but will come back by the afternoon.  He is not using anything for pain at this time.    Procedures     Past History:  Medical History: has a past medical history of Coronary artery disease, Heart disease, HTN (hypertension), Hyperlipemia, Rotator cuff syndrome (09/2022), and Rotator cuff tear, right.   Surgical History: has a past surgical history that includes Appendectomy; Coronary stent placement (2000); Rotator cuff repair (Left); Colonoscopy (N/A, 10/18/2021); Excision Lesion (Right, 02/25/2022); Shoulder arthroscopy w/ rotator cuff repair (Right, 01/25/2023); and Shoulder surgery (01/25/2023).   Family History: family history includes Cancer in his father; Heart disease in his brother, father, mother, and sister; Lung cancer in an other family member.   Social History: reports that he has never smoked. He has never used smokeless tobacco. He reports current alcohol use. He reports that he does not use drugs.  Immunization History   Administered Date(s) Administered    COVID-19 (MODERNA) Monovalent Original Booster 11/23/2021    Covid-19 (Pfizer) Gray Cap Monovalent 07/14/2022    Fluzone (or Fluarix & Flulaval for VFC) >6mos 10/27/2022, 09/13/2023    Influenza, Unspecified 10/09/2020         Allergies: Patient has no known allergies.     Medications:  Current Outpatient Medications on File Prior to Visit   Medication Sig Dispense Refill    apixaban (ELIQUIS) 5 MG tablet tablet Take 1 tablet by mouth 2 (Two) Times a Day. 180 tablet 3    aspirin 81 MG EC tablet Take  by  "mouth Daily. MAY HOLD FOR 7 DAYS PER DR. TERRAZAS. LAST DOSE 01/19/23 PER PATIENT      B Complex-Biotin-FA (B Complete) tablet Take  by mouth.      lisinopril (PRINIVIL,ZESTRIL) 20 MG tablet TAKE 1 TABLET DAILY 90 tablet 3    loratadine (Claritin) 10 MG tablet Take 1 tablet by mouth 2 (Two) Times a Day As Needed (urticaria). 180 tablet 3    metoprolol succinate XL (TOPROL-XL) 50 MG 24 hr tablet Take 1 tablet PO daily. May take extra 1/2 tablet daily prn if HR is greater than 110 90 tablet 3    Repatha SureClick solution auto-injector SureClick injection INJECT 1 ML (140 MG) UNDER THE SKIN EVERY 2 WEEKS IN THE ABDOMEN, THIGH OR OUTER AREA OF UPPER ARM (ROTATE SITES) 6 mL 3    rosuvastatin (CRESTOR) 20 MG tablet TAKE 1 TABLET DAILY 90 tablet 3    sildenafil (VIAGRA) 100 MG tablet Take 1 tablet by mouth Daily As Needed.       No current facility-administered medications on file prior to visit.        Health Maintenance Due   Topic Date Due    URINE MICROALBUMIN  Never done    Pneumococcal Vaccine 0-64 (1 - PCV) Never done    ZOSTER VACCINE (1 of 2) Never done    HEPATITIS C SCREENING  Never done    ANNUAL PHYSICAL  Never done    DIABETIC FOOT EXAM  Never done    DIABETIC EYE EXAM  Never done    COVID-19 Vaccine (3 - 2023-24 season) 09/01/2023       Vital Signs:   Vitals:    11/15/23 1116   BP: 100/60   Pulse: 82   Temp: 97.3 °F (36.3 °C)   SpO2: 98%   Weight: 93.2 kg (205 lb 6.4 oz)   Height: 177.8 cm (70\")      Body mass index is 29.47 kg/m².     ROS:  Review of Systems       Physical Exam     Result Review        Diagnoses and all orders for this visit:    1. Acute pain of left knee (Primary)  Comments:  Have started patient on Voltaren we have discussed increased risk for bleeding.  And have given him a steroid injection in office today.  Orders:  -     methylPREDNISolone acetate (DEPO-medrol) injection 40 mg    Other orders  -     diclofenac (VOLTAREN) 50 MG EC tablet; Take 1 tablet by mouth 2 (Two) Times a Day As " Needed (knee pain) for up to 30 days.  Dispense: 60 tablet; Refill: 0              Follow Up   No follow-ups on file.  Patient was given instructions and counseling regarding his condition or for health maintenance advice. Please see specific information pulled into the AVS if appropriate.       ENRIQUE Nickerson

## 2023-11-20 ENCOUNTER — OFFICE VISIT (OUTPATIENT)
Dept: FAMILY MEDICINE CLINIC | Facility: CLINIC | Age: 63
End: 2023-11-20
Payer: OTHER GOVERNMENT

## 2023-11-20 ENCOUNTER — HOSPITAL ENCOUNTER (OUTPATIENT)
Dept: MRI IMAGING | Facility: HOSPITAL | Age: 63
Discharge: HOME | End: 2023-11-20
Payer: OTHER GOVERNMENT

## 2023-11-20 ENCOUNTER — HOSPITAL ENCOUNTER (OUTPATIENT)
Facility: HOSPITAL | Age: 63
Discharge: HOME | End: 2023-11-20
Payer: OTHER GOVERNMENT

## 2023-11-20 VITALS
BODY MASS INDEX: 29.28 KG/M2 | SYSTOLIC BLOOD PRESSURE: 118 MMHG | DIASTOLIC BLOOD PRESSURE: 78 MMHG | TEMPERATURE: 98.1 F | WEIGHT: 204.5 LBS | HEIGHT: 70 IN | HEART RATE: 93 BPM | OXYGEN SATURATION: 97 %

## 2023-11-20 DIAGNOSIS — M25.462 PAIN AND SWELLING OF KNEE, LEFT: ICD-10-CM

## 2023-11-20 DIAGNOSIS — R60.0 EDEMA OF LEFT LOWER EXTREMITY: ICD-10-CM

## 2023-11-20 DIAGNOSIS — S83.232A COMPLEX TEAR OF MEDIAL MENISCUS OF LEFT KNEE AS CURRENT INJURY, INITIAL ENCOUNTER: Primary | ICD-10-CM

## 2023-11-20 DIAGNOSIS — M25.562 PAIN AND SWELLING OF KNEE, LEFT: ICD-10-CM

## 2023-11-20 DIAGNOSIS — M25.562 ACUTE PAIN OF LEFT KNEE: ICD-10-CM

## 2023-11-20 DIAGNOSIS — I48.0 PAROXYSMAL ATRIAL FIBRILLATION: ICD-10-CM

## 2023-11-20 DIAGNOSIS — M25.562 ACUTE PAIN OF LEFT KNEE: Primary | ICD-10-CM

## 2023-11-20 LAB
BH CV LOWER VASCULAR LEFT COMMON FEMORAL AUGMENT: NORMAL
BH CV LOWER VASCULAR LEFT COMMON FEMORAL COMPETENT: NORMAL
BH CV LOWER VASCULAR LEFT COMMON FEMORAL COMPRESS: NORMAL
BH CV LOWER VASCULAR LEFT COMMON FEMORAL PHASIC: NORMAL
BH CV LOWER VASCULAR LEFT COMMON FEMORAL SPONT: NORMAL
BH CV LOWER VASCULAR LEFT GASTRONEMIUS COMPRESS: NORMAL
BH CV LOWER VASCULAR LEFT GREATER SAPH AK AUGMENT: NORMAL
BH CV LOWER VASCULAR LEFT GREATER SAPH AK COMPETENT: NORMAL
BH CV LOWER VASCULAR LEFT GREATER SAPH AK COMPRESS: NORMAL
BH CV LOWER VASCULAR LEFT GREATER SAPH AK PHASIC: NORMAL
BH CV LOWER VASCULAR LEFT GREATER SAPH AK SPONT: NORMAL
BH CV LOWER VASCULAR LEFT GREATER SAPH BK COMPRESS: NORMAL
BH CV LOWER VASCULAR LEFT LESSER SAPH COMPRESS: NORMAL
BH CV LOWER VASCULAR LEFT MID FEMORAL AUGMENT: NORMAL
BH CV LOWER VASCULAR LEFT MID FEMORAL COMPETENT: NORMAL
BH CV LOWER VASCULAR LEFT MID FEMORAL COMPRESS: NORMAL
BH CV LOWER VASCULAR LEFT MID FEMORAL PHASIC: NORMAL
BH CV LOWER VASCULAR LEFT MID FEMORAL SPONT: NORMAL
BH CV LOWER VASCULAR LEFT PERONEAL AUGMENT: NORMAL
BH CV LOWER VASCULAR LEFT PERONEAL COMPETENT: NORMAL
BH CV LOWER VASCULAR LEFT PERONEAL COMPRESS: NORMAL
BH CV LOWER VASCULAR LEFT PERONEAL PHASIC: NORMAL
BH CV LOWER VASCULAR LEFT PERONEAL SPONT: NORMAL
BH CV LOWER VASCULAR LEFT POPLITEAL AUGMENT: NORMAL
BH CV LOWER VASCULAR LEFT POPLITEAL COMPETENT: NORMAL
BH CV LOWER VASCULAR LEFT POPLITEAL COMPRESS: NORMAL
BH CV LOWER VASCULAR LEFT POPLITEAL PHASIC: NORMAL
BH CV LOWER VASCULAR LEFT POPLITEAL SPONT: NORMAL
BH CV LOWER VASCULAR LEFT POSTERIOR TIBIAL AUGMENT: NORMAL
BH CV LOWER VASCULAR LEFT POSTERIOR TIBIAL COMPETENT: NORMAL
BH CV LOWER VASCULAR LEFT POSTERIOR TIBIAL COMPRESS: NORMAL
BH CV LOWER VASCULAR LEFT POSTERIOR TIBIAL PHASIC: NORMAL
BH CV LOWER VASCULAR LEFT POSTERIOR TIBIAL SPONT: NORMAL
BH CV LOWER VASCULAR LEFT PROXIMAL FEMORAL COMPRESS: NORMAL
BH CV LOWER VASCULAR RIGHT COMMON FEMORAL AUGMENT: NORMAL
BH CV LOWER VASCULAR RIGHT COMMON FEMORAL COMPETENT: NORMAL
BH CV LOWER VASCULAR RIGHT COMMON FEMORAL COMPRESS: NORMAL
BH CV LOWER VASCULAR RIGHT COMMON FEMORAL PHASIC: NORMAL
BH CV LOWER VASCULAR RIGHT COMMON FEMORAL SPONT: NORMAL

## 2023-11-20 PROCEDURE — 93971 EXTREMITY STUDY: CPT

## 2023-11-20 PROCEDURE — 93971 EXTREMITY STUDY: CPT | Performed by: SURGERY

## 2023-11-20 PROCEDURE — 73721 MRI JNT OF LWR EXTRE W/O DYE: CPT

## 2023-11-20 PROCEDURE — 99213 OFFICE O/P EST LOW 20 MIN: CPT | Performed by: FAMILY MEDICINE

## 2023-11-20 RX ORDER — LISINOPRIL 20 MG/1
TABLET ORAL
Qty: 90 TABLET | Refills: 3 | Status: SHIPPED | OUTPATIENT
Start: 2023-11-20

## 2023-11-20 NOTE — PROGRESS NOTES
"Chief Complaint  Left knee pain    Subjective        Carlos Manuel Sanchez presents to [facility]  History of Present Illness  Patient presents today for an acute visit complaining of left knee pain.  He was seen in our clinic on 11/15/2023 with the symptoms that started about 2 weeks ago.  He has been walking a lot.  He started having swelling and tightness on the anterior aspect of his left knee however he does point to some posterior pain as well.  He was given Voltaren tablets which have not helped out.  Symptoms have continued to persist.  He does have atrial fibrillation and is on Eliquis 5 mg twice daily as well as metoprolol 50 mg daily.  His well score today is a 3.  Objective   Vital Signs:  /78 (BP Location: Left arm, Patient Position: Sitting)   Pulse 93   Temp 98.1 °F (36.7 °C) (Oral)   Ht 177.8 cm (70\")   Wt 92.8 kg (204 lb 8 oz)   SpO2 97%   BMI 29.34 kg/m²   Estimated body mass index is 29.34 kg/m² as calculated from the following:    Height as of this encounter: 177.8 cm (70\").    Weight as of this encounter: 92.8 kg (204 lb 8 oz).               Physical Exam  Vitals reviewed.   Constitutional:       Appearance: Normal appearance.   HENT:      Head: Normocephalic and atraumatic.      Right Ear: External ear normal.      Left Ear: External ear normal.      Mouth/Throat:      Pharynx: No oropharyngeal exudate.   Eyes:      Conjunctiva/sclera: Conjunctivae normal.   Cardiovascular:      Rate and Rhythm: Normal rate and regular rhythm.      Heart sounds: No murmur heard.     No friction rub. No gallop.   Pulmonary:      Effort: Pulmonary effort is normal.      Breath sounds: Normal breath sounds. No wheezing or rhonchi.   Abdominal:      General: Bowel sounds are normal. There is no distension.      Palpations: Abdomen is soft.      Tenderness: There is no abdominal tenderness.   Musculoskeletal:      Comments: Tender to palpation at the posterior aspect of the " left knee.  No edema noted.  Negative valgus and varus stress testing, negative Bossman, negative anterior and posterior drawer testing.  Mild effusion noted on the knee.   Skin:     General: Skin is warm and dry.   Neurological:      Mental Status: He is alert and oriented to person, place, and time.      Cranial Nerves: No cranial nerve deficit.   Psychiatric:         Mood and Affect: Mood and affect normal.         Behavior: Behavior normal.         Thought Content: Thought content normal.         Judgment: Judgment normal.        Result Review :                   Assessment and Plan   Diagnoses and all orders for this visit:    1. Acute pain of left knee (Primary)  -     MRI Knee Left Without Contrast; Future    2. Pain and swelling of knee, left  -     MRI Knee Left Without Contrast; Future    3. Edema of left lower extremity  -     Duplex Venous Lower Extremity - Left CAR; Future    4. Paroxysmal atrial fibrillation    I discussed with patient further evaluation to rule out DVT as well as to get an MRI of the left knee to evaluate for injury such as meniscal injury.  I did discuss with him further recommendations to follow once results return.  His symptoms are manageable at this time.  He does have a knee brace and he is encouraged to use this.         Follow Up   Return if symptoms worsen or fail to improve.  Patient was given instructions and counseling regarding his condition or for health maintenance advice. Please see specific information pulled into the AVS if appropriate.

## 2023-11-28 ENCOUNTER — OFFICE VISIT (OUTPATIENT)
Dept: ORTHOPEDIC SURGERY | Facility: CLINIC | Age: 63
End: 2023-11-28
Payer: OTHER GOVERNMENT

## 2023-11-28 VITALS
HEART RATE: 90 BPM | OXYGEN SATURATION: 96 % | WEIGHT: 204 LBS | HEIGHT: 70 IN | DIASTOLIC BLOOD PRESSURE: 88 MMHG | BODY MASS INDEX: 29.2 KG/M2 | SYSTOLIC BLOOD PRESSURE: 128 MMHG

## 2023-11-28 DIAGNOSIS — S83.232A COMPLEX TEAR OF MEDIAL MENISCUS OF LEFT KNEE AS CURRENT INJURY, INITIAL ENCOUNTER: Primary | ICD-10-CM

## 2023-11-28 RX ORDER — LIDOCAINE HYDROCHLORIDE 10 MG/ML
5 INJECTION, SOLUTION INFILTRATION; PERINEURAL
Status: COMPLETED | OUTPATIENT
Start: 2023-11-28 | End: 2023-11-28

## 2023-11-28 RX ORDER — TRIAMCINOLONE ACETONIDE 40 MG/ML
40 INJECTION, SUSPENSION INTRA-ARTICULAR; INTRAMUSCULAR
Status: COMPLETED | OUTPATIENT
Start: 2023-11-28 | End: 2023-11-28

## 2023-11-28 RX ADMIN — LIDOCAINE HYDROCHLORIDE 5 ML: 10 INJECTION, SOLUTION INFILTRATION; PERINEURAL at 08:44

## 2023-11-28 RX ADMIN — TRIAMCINOLONE ACETONIDE 40 MG: 40 INJECTION, SUSPENSION INTRA-ARTICULAR; INTRAMUSCULAR at 08:44

## 2023-11-28 NOTE — PROGRESS NOTES
"Chief Complaint  Initial Evaluation of the Left Knee     Subjective      Carlos Manuel Sanchez presents to [facility] for initial evaluation of the left knee.  He had a MRI on 11/20/23.  He has had pain for about three weeks.  He was running and was fine until he was going down hill.  He then started hunting and sitting in a catchers position for hours and started having pain.  He had a Doppler on 11/20/23 that was negative. He has swelling and notes tightness of the left knee. He feels like his knee is loose and feels like he drags his left leg behind. He is on Eliquis. He wears a brace at times for support.     No Known Allergies     Social History     Socioeconomic History    Marital status:    Tobacco Use    Smoking status: Never    Smokeless tobacco: Never    Tobacco comments:     Never use   Vaping Use    Vaping Use: Never used   Substance and Sexual Activity    Alcohol use: Yes     Alcohol/week: 10.0 standard drinks of alcohol     Types: 10 Cans of beer per week     Comment: Social    Drug use: Never    Sexual activity: Yes     Partners: Female     Birth control/protection: Vasectomy        I reviewed the patient's chief complaint, history of present illness, review of systems, past medical history, surgical history, family history, social history, medications, and allergy list.     Review of Systems     Constitutional: Denies fevers, chills, weight loss  Cardiovascular: Denies chest pain, shortness of breath  Skin: Denies rashes, acute skin changes  Neurologic: Denies headache, loss of consciousness      Vital Signs:   /88 (BP Location: Right arm, Patient Position: Sitting, Cuff Size: Adult)   Pulse 90   Ht 177.8 cm (70\")   Wt 92.5 kg (204 lb)   SpO2 96%   BMI 29.27 kg/m²          Physical Exam  General: Alert. No acute distress    Ortho Exam        LEFT KNEE Flexion 115. Extension -3. Stable to varus/valgus stress. Stable to anterior/posterior drawer. " Neurovascularly intact. Positive Bossman. Negative Lachman. Positive EHL, FHL, HS and TA. Sensation intact to light touch all 5 nerves of the foot. Ambulates with Antalgic gait. Patella is well tracking. Calf supple, non-tender. Positive tenderness to the medial joint line. Negative tenderness to the lateral joint line. Negative Crepitus. Good strength to hamstrings, quadriceps, dorsiflexors, and plantar flexors.  Knee Extensor Mechanism intact Moderate swelling.       Large Joint Arthrocentesis: L knee  Date/Time: 11/28/2023 8:44 AM  Consent given by: patient  Site marked: site marked  Timeout: Immediately prior to procedure a time out was called to verify the correct patient, procedure, equipment, support staff and site/side marked as required   Supporting Documentation  Indications: pain   Procedure Details  Location: knee - L knee  Needle gauge: 21G.  Medications administered: 40 mg triamcinolone acetonide 40 MG/ML; 5 mL lidocaine 1 %  Patient tolerance: patient tolerated the procedure well with no immediate complications          Imaging Results (Most Recent)       None             Result Review :         Duplex Venous Lower Extremity - Left CAR    Result Date: 11/20/2023  Narrative:   Normal left lower extremity venous duplex scan.     MRI Knee Left Without Contrast    Result Date: 11/20/2023  Narrative: PROCEDURE: MRI KNEE LEFT  WO CONTRAST  COMPARISON: None  INDICATIONS: pain x 2 weeks with swelling      TECHNIQUE: A complete multi-planar MRI was performed.   FINDINGS:  Mild edema is noted in the medial femoral condyle, likely osteoarthritic in etiology.  No fracture or malalignment is identified.   There is a complex tear of the medial meniscal posterior horn exiting the superior and inferior surfaces.  No tear of the lateral meniscus is seen.  Cruciate ligaments are intact.  The medial collateral ligament, lateral collateral ligament complex, patellar retinacula and extensor mechanism appear intact.  A  small knee joint effusion is noted.  Edema is noted throughout the medial head of the gastrocnemius muscle.  There is mild soft tissue edema noted elsewhere around the knee joint.  There is a 1.3 cm focus of full-thickness cartilage loss along weight-bearing aspect of medial femoral condyle.  Cartilage elsewhere in the joint appears intact.  No loose body is seen.  There is deep infrapatellar bursitis.  A small popliteal cyst is noted measuring 1.3 cm.      Impression:   1. Complex tear of the medial meniscal posterior horn 2. Osteoarthritic changes predominantly involving the medial compartment 3. Small knee joint effusion 4. Strain of the gastrocnemius medial head 5. Mild soft tissue edema surrounding the knee joint 6. 1.3 cm popliteal cyst 7. Deep infrapatellar bursitis     Fadi Scott M.D.       Electronically Signed and Approved By: Fadi Scott M.D. on 11/20/2023 at 17:13                     Assessment and Plan     Diagnoses and all orders for this visit:    1. tear of medial meniscus of left knee as current injury, initial encounter (Primary)  -     Large Joint Arthrocentesis: L knee        Discussed the treatment plan with the patient. I reviewed the MRI results with the patient.     Discussed the treatment options with the patient, operative vs non-operative. The patient is a candidate for a left knee arthroscopy when he is ready.     The patient expressed understanding and wished to proceed with a left knee steroid injection.  He tolerated the injection well.     HEP exercises.     Call or return if worsening symptoms.    Follow Up     2-3 weeks Discuss if injection was helpful vs surgical intervention.       Patient was given instructions and counseling regarding his condition or for health maintenance advice. Please see specific information pulled into the AVS if appropriate.     Scribed for Lisa James MD by Zara Milton MA.  11/28/23   08:28 EST    I have personally performed the services  described in this document as scribed by the above individual and it is both accurate and complete. Lisa James MD 11/28/23

## 2023-12-15 ENCOUNTER — TELEPHONE (OUTPATIENT)
Dept: CARDIOLOGY | Facility: CLINIC | Age: 63
End: 2023-12-15
Payer: OTHER GOVERNMENT

## 2023-12-15 ENCOUNTER — PREP FOR SURGERY (OUTPATIENT)
Dept: OTHER | Facility: HOSPITAL | Age: 63
End: 2023-12-15
Payer: OTHER GOVERNMENT

## 2023-12-15 ENCOUNTER — TELEPHONE (OUTPATIENT)
Dept: ORTHOPEDIC SURGERY | Facility: CLINIC | Age: 63
End: 2023-12-15
Payer: OTHER GOVERNMENT

## 2023-12-15 ENCOUNTER — OFFICE VISIT (OUTPATIENT)
Dept: ORTHOPEDIC SURGERY | Facility: CLINIC | Age: 63
End: 2023-12-15
Payer: OTHER GOVERNMENT

## 2023-12-15 VITALS — HEART RATE: 92 BPM | OXYGEN SATURATION: 97 % | DIASTOLIC BLOOD PRESSURE: 88 MMHG | SYSTOLIC BLOOD PRESSURE: 127 MMHG

## 2023-12-15 DIAGNOSIS — S83.242D ACUTE MEDIAL MENISCUS TEAR OF LEFT KNEE, SUBSEQUENT ENCOUNTER: ICD-10-CM

## 2023-12-15 DIAGNOSIS — M25.562 ACUTE PAIN OF LEFT KNEE: Primary | ICD-10-CM

## 2023-12-15 DIAGNOSIS — Z01.818 PREOP EXAMINATION: Primary | ICD-10-CM

## 2023-12-15 PROBLEM — S83.242A ACUTE MEDIAL MENISCUS TEAR OF LEFT KNEE: Status: ACTIVE | Noted: 2023-12-15

## 2023-12-15 RX ORDER — CEFAZOLIN SODIUM 2 G/100ML
2 INJECTION, SOLUTION INTRAVENOUS ONCE
OUTPATIENT
Start: 2023-12-15 | End: 2023-12-15

## 2023-12-15 RX ORDER — CEFAZOLIN SODIUM IN 0.9 % NACL 3 G/100 ML
3 INTRAVENOUS SOLUTION, PIGGYBACK (ML) INTRAVENOUS ONCE
OUTPATIENT
Start: 2023-12-15 | End: 2023-12-15

## 2023-12-15 NOTE — TELEPHONE ENCOUNTER
Procedure: L Knee Scope and MM Tear    Med Directive: Aspirin, Eliquis    PMH: CAD prior stent, HTN, HLD, afib    Last Seen: 8/31/23

## 2023-12-15 NOTE — H&P (VIEW-ONLY)
Chief Complaint  Follow-up of the Left Knee    Subjective      Carlos Manuel Sanchez presents to [facility] for follow-up of left knee medial meniscus tear.  Patient had an MRI on 11/20/2023 which showed a medial meniscus tear, osteoarthritic changes predominantly in the medial compartment, small knee joint effusion, strain of the gastrocnemius medial head, mild edema surrounding the knee joint and a popliteal cyst as well as deep infrapatellar bursitis.  Patient at his last appointment on 11/28/2023 received a left knee steroid injection which he reports did not help.  He would like to discuss other options today.     Objective   No Known Allergies    Vital Signs:   /88   Pulse 92   SpO2 97%       Physical Exam    Constitutional: Awake, alert. Well nourished appearance.    Integumentary: Warm, dry, intact. No obvious rashes.    HENT: Atraumatic, normocephalic.   Respiratory: Non labored respirations .   Cardiovascular: Intact peripheral pulses.    Psychiatric: Normal mood and affect. A&O X3    Ortho Exam   Left knee: Range of motion -2 extension to 95 degree flexion.  Stable to varus and valgus stress.  Stable to anterior and posterior drawer test.  Positive Bossman's.  Negative Lachman's.  Neurovascular intact.  Sensation intact.  Baker's cyst noted.    Imaging Results (Most Recent)       None                      Assessment and Plan   Problem List Items Addressed This Visit          Musculoskeletal and Injuries    Acute medial meniscus tear of left knee     Other Visit Diagnoses       Acute pain of left knee    -  Primary            Follow Up   Return for Postoperative.    Patient is a non-smoker, did not discuss options for smoking cessation.     Social History     Socioeconomic History    Marital status:    Tobacco Use    Smoking status: Never    Smokeless tobacco: Never    Tobacco comments:     Never use   Vaping Use    Vaping Use: Never used   Substance and Sexual  Activity    Alcohol use: Yes     Alcohol/week: 10.0 standard drinks of alcohol     Types: 10 Cans of beer per week     Comment: Social    Drug use: Never    Sexual activity: Yes     Partners: Female     Birth control/protection: Vasectomy       Patient Instructions   Reviewed MRI results with patient.  We discussed continued conservative measures versus operative.  Patient would like to discuss a left knee arthroscopy.    Risk versus benefits and alternatives discussed for left knee arthroscopy with partial medial meniscectomy and possible chondroplasty.  We discussed the expected recovery time as well as expectations after surgery.  Patient verbalized understanding and has no further questions at this time.  Patient would like to proceed with scheduling surgery.    Patient to follow-up 2 weeks postoperatively.  Call for questions, concerns or worsening symptoms.  Patient was given instructions and counseling regarding his condition or for health maintenance advice. Please see specific information pulled into the AVS if appropriate.

## 2023-12-15 NOTE — PATIENT INSTRUCTIONS
Reviewed MRI results with patient.  We discussed continued conservative measures versus operative.  Patient would like to discuss a left knee arthroscopy.    Risk versus benefits and alternatives discussed for left knee arthroscopy with partial medial meniscectomy and possible chondroplasty.  We discussed the expected recovery time as well as expectations after surgery.  Patient verbalized understanding and has no further questions at this time.  Patient would like to proceed with scheduling surgery.    Patient to follow-up 2 weeks postoperatively.  Call for questions, concerns or worsening symptoms.

## 2023-12-15 NOTE — TELEPHONE ENCOUNTER
CALLED PATIENT AND INFORMED WIFE PER DR WARD THAT PATIENT COULD HOLD/STOP ELIQUIS FOR 2 DAYS PRIOR TO SURGERY AND ASPIRIN FOR 7 DAYS PRIOR.  WIFE VOICES UNDERSTANDING.

## 2023-12-15 NOTE — PROGRESS NOTES
Chief Complaint  Follow-up of the Left Knee    Subjective      Carlos Manuel Sanchez presents to [facility] for follow-up of left knee medial meniscus tear.  Patient had an MRI on 11/20/2023 which showed a medial meniscus tear, osteoarthritic changes predominantly in the medial compartment, small knee joint effusion, strain of the gastrocnemius medial head, mild edema surrounding the knee joint and a popliteal cyst as well as deep infrapatellar bursitis.  Patient at his last appointment on 11/28/2023 received a left knee steroid injection which he reports did not help.  He would like to discuss other options today.     Objective   No Known Allergies    Vital Signs:   /88   Pulse 92   SpO2 97%       Physical Exam    Constitutional: Awake, alert. Well nourished appearance.    Integumentary: Warm, dry, intact. No obvious rashes.    HENT: Atraumatic, normocephalic.   Respiratory: Non labored respirations .   Cardiovascular: Intact peripheral pulses.    Psychiatric: Normal mood and affect. A&O X3    Ortho Exam   Left knee: Range of motion -2 extension to 95 degree flexion.  Stable to varus and valgus stress.  Stable to anterior and posterior drawer test.  Positive Bossman's.  Negative Lachman's.  Neurovascular intact.  Sensation intact.  Baker's cyst noted.    Imaging Results (Most Recent)       None                      Assessment and Plan   Problem List Items Addressed This Visit          Musculoskeletal and Injuries    Acute medial meniscus tear of left knee     Other Visit Diagnoses       Acute pain of left knee    -  Primary            Follow Up   Return for Postoperative.    Patient is a non-smoker, did not discuss options for smoking cessation.     Social History     Socioeconomic History    Marital status:    Tobacco Use    Smoking status: Never    Smokeless tobacco: Never    Tobacco comments:     Never use   Vaping Use    Vaping Use: Never used   Substance and Sexual  Activity    Alcohol use: Yes     Alcohol/week: 10.0 standard drinks of alcohol     Types: 10 Cans of beer per week     Comment: Social    Drug use: Never    Sexual activity: Yes     Partners: Female     Birth control/protection: Vasectomy       Patient Instructions   Reviewed MRI results with patient.  We discussed continued conservative measures versus operative.  Patient would like to discuss a left knee arthroscopy.    Risk versus benefits and alternatives discussed for left knee arthroscopy with partial medial meniscectomy and possible chondroplasty.  We discussed the expected recovery time as well as expectations after surgery.  Patient verbalized understanding and has no further questions at this time.  Patient would like to proceed with scheduling surgery.    Patient to follow-up 2 weeks postoperatively.  Call for questions, concerns or worsening symptoms.  Patient was given instructions and counseling regarding his condition or for health maintenance advice. Please see specific information pulled into the AVS if appropriate.

## 2023-12-27 NOTE — PRE-PROCEDURE INSTRUCTIONS
IMPORTANT INSTRUCTIONS - PRE-ADMISSION TESTING  DO NOT EAT OR CHEW anything after midnight the night before your procedure.    You may have SIPS NO RED CLEAR liquids up to ___2___ hours prior to ARRIVAL time.   Take the following medications the morning of your procedure with JUST A SIP OF WATER:  ___LORATADINE, METOPROLOL, ROSUVASTATIN____________________________________________________________________________________________________________________________________________________________________________________    DO NOT BRING your medications to the hospital with you, UNLESS something has changed since your PRE-Admission Testing appointment.  Hold all vitamins, supplements, and NSAIDS (Non- steroidal anti-inflammatory meds) for one week prior to surgery (you MAY take Tylenol or Acetaminophen).  If you are diabetic, check your blood sugar the morning of your procedure. If it is less than 70 or if you are feeling symptomatic, call the following number for further instructions: [phone]  Use your inhalers/nebulizers as usual, the morning of your procedure. BRING YOUR INHALERS with you.   Bring your CPAP or BIPAP to hospital, ONLY IF YOU WILL BE SPENDING THE NIGHT.   Make sure you have a ride home and have someone who will stay with you the day of your procedure after you go home.  If you have any questions, please call your Pre-Admission Testing Nurse, _____BRII___________ at [phone]__.   Per anesthesia request, do not smoke for 24 hours before your procedure or as instructed by your surgeon.    BATHING INSTRUCTIONS GIVEN. NO JEWELRY DAY OF PROCEDURE.   ENTRANCE C [facility] MAIN DOOR  CASH, CHECK, OR CARD FOR MEDS TO BED IF INDICATED AT DISCHARGE  LAST DOSE OF ELIQUIS TO BE 12/31/23 PM DOSE AS INSTRUCTED BY DR WARD.

## 2024-01-02 ENCOUNTER — ANESTHESIA EVENT (OUTPATIENT)
Dept: PERIOP | Facility: HOSPITAL | Age: 64
End: 2024-01-02
Payer: OTHER GOVERNMENT

## 2024-01-02 NOTE — ANESTHESIA PREPROCEDURE EVALUATION
Anesthesia Evaluation     Patient summary reviewed and Nursing notes reviewed   no history of anesthetic complications:   NPO Solid Status: > 8 hours  NPO Liquid Status: > 2 hours           Airway   Mallampati: II  TM distance: >3 FB  Neck ROM: full  No difficulty expected  Dental      Pulmonary - negative pulmonary ROS and normal exam    breath sounds clear to auscultation  Cardiovascular - normal exam  Exercise tolerance: good (4-7 METS)    ECG reviewed  Rhythm: regular  Rate: normal    (+) hypertension well controlled, CAD, cardiac stents (2000) more than 12 months ago , dysrhythmias Atrial Fib, hyperlipidemia      Neuro/Psych- negative ROS  GI/Hepatic/Renal/Endo - negative ROS     Musculoskeletal (-) negative ROS    Abdominal    Substance History - negative use     OB/GYN negative ob/gyn ROS         Other - negative ROS       ROS/Med Hx Other: Last eliquis 12/31    Echo 5/23  LVEF = 56% , Trace-mild MR, Trace-mild TR                   Anesthesia Plan    ASA 3     general     (Patient understands anesthesia not responsible for dental damage.)        CODE STATUS:

## 2024-01-03 ENCOUNTER — HOSPITAL ENCOUNTER (OUTPATIENT)
Facility: HOSPITAL | Age: 64
Setting detail: HOSPITAL OUTPATIENT SURGERY
Discharge: HOME | End: 2024-01-03
Attending: ORTHOPAEDIC SURGERY | Admitting: ORTHOPAEDIC SURGERY
Payer: OTHER GOVERNMENT

## 2024-01-03 ENCOUNTER — ANESTHESIA (OUTPATIENT)
Dept: PERIOP | Facility: HOSPITAL | Age: 64
End: 2024-01-03
Payer: OTHER GOVERNMENT

## 2024-01-03 VITALS
HEART RATE: 78 BPM | RESPIRATION RATE: 18 BRPM | BODY MASS INDEX: 29.35 KG/M2 | OXYGEN SATURATION: 96 % | SYSTOLIC BLOOD PRESSURE: 123 MMHG | WEIGHT: 205.03 LBS | HEIGHT: 70 IN | TEMPERATURE: 97.2 F | DIASTOLIC BLOOD PRESSURE: 88 MMHG

## 2024-01-03 DIAGNOSIS — S83.242D ACUTE MEDIAL MENISCUS TEAR OF LEFT KNEE, SUBSEQUENT ENCOUNTER: ICD-10-CM

## 2024-01-03 DIAGNOSIS — Z01.818 PREOP EXAMINATION: ICD-10-CM

## 2024-01-03 LAB
ANION GAP SERPL CALCULATED.3IONS-SCNC: 6.8 MMOL/L (ref 5–15)
BUN SERPL-MCNC: 14 MG/DL (ref 8–23)
BUN/CREAT SERPL: 15.7 (ref 7–25)
CALCIUM SPEC-SCNC: 9.2 MG/DL (ref 8.6–10.5)
CHLORIDE SERPL-SCNC: 99 MMOL/L (ref 98–107)
CO2 SERPL-SCNC: 26.2 MMOL/L (ref 22–29)
CREAT SERPL-MCNC: 0.89 MG/DL (ref 0.76–1.27)
EGFRCR SERPLBLD CKD-EPI 2021: 96.9 ML/MIN/1.73
GLUCOSE SERPL-MCNC: 124 MG/DL (ref 65–99)
POTASSIUM SERPL-SCNC: 4.3 MMOL/L (ref 3.5–5.2)
QT INTERVAL: 401 MS
QTC INTERVAL: 480 MS
SODIUM SERPL-SCNC: 132 MMOL/L (ref 136–145)

## 2024-01-03 PROCEDURE — 93005 ELECTROCARDIOGRAM TRACING: CPT | Performed by: ANESTHESIOLOGY

## 2024-01-03 PROCEDURE — 25810000003 LACTATED RINGERS PER 1000 ML: Performed by: ANESTHESIOLOGY

## 2024-01-03 PROCEDURE — 25010000002 PROPOFOL 10 MG/ML EMULSION: Performed by: MARRIAGE & FAMILY THERAPIST

## 2024-01-03 PROCEDURE — 25010000002 DEXAMETHASONE PER 1 MG: Performed by: MARRIAGE & FAMILY THERAPIST

## 2024-01-03 PROCEDURE — 25010000002 FENTANYL CITRATE (PF) 50 MCG/ML SOLUTION: Performed by: MARRIAGE & FAMILY THERAPIST

## 2024-01-03 PROCEDURE — 25010000002 GLYCOPYRROLATE 0.2 MG/ML SOLUTION: Performed by: MARRIAGE & FAMILY THERAPIST

## 2024-01-03 PROCEDURE — 25010000002 KETOROLAC TROMETHAMINE PER 15 MG: Performed by: MARRIAGE & FAMILY THERAPIST

## 2024-01-03 PROCEDURE — 93010 ELECTROCARDIOGRAM REPORT: CPT | Performed by: SPECIALIST

## 2024-01-03 PROCEDURE — 25010000002 CEFAZOLIN IN DEXTROSE 2000 MG/ 100 ML SOLUTION

## 2024-01-03 PROCEDURE — 80048 BASIC METABOLIC PNL TOTAL CA: CPT | Performed by: ANESTHESIOLOGY

## 2024-01-03 PROCEDURE — 29881 ARTHRS KNE SRG MNISECTMY M/L: CPT | Performed by: ORTHOPAEDIC SURGERY

## 2024-01-03 PROCEDURE — 25010000002 MIDAZOLAM PER 1MG: Performed by: ANESTHESIOLOGY

## 2024-01-03 PROCEDURE — 25010000002 ONDANSETRON PER 1 MG: Performed by: MARRIAGE & FAMILY THERAPIST

## 2024-01-03 RX ORDER — KETOROLAC TROMETHAMINE 30 MG/ML
INJECTION, SOLUTION INTRAMUSCULAR; INTRAVENOUS AS NEEDED
Status: DISCONTINUED | OUTPATIENT
Start: 2024-01-03 | End: 2024-01-03 | Stop reason: SURG

## 2024-01-03 RX ORDER — ONDANSETRON 2 MG/ML
4 INJECTION INTRAMUSCULAR; INTRAVENOUS ONCE AS NEEDED
Status: DISCONTINUED | OUTPATIENT
Start: 2024-01-03 | End: 2024-01-03 | Stop reason: HOSPADM

## 2024-01-03 RX ORDER — SODIUM CHLORIDE, SODIUM LACTATE, POTASSIUM CHLORIDE, CALCIUM CHLORIDE 600; 310; 30; 20 MG/100ML; MG/100ML; MG/100ML; MG/100ML
9 INJECTION, SOLUTION INTRAVENOUS CONTINUOUS PRN
Status: DISCONTINUED | OUTPATIENT
Start: 2024-01-03 | End: 2024-01-03 | Stop reason: HOSPADM

## 2024-01-03 RX ORDER — OXYCODONE HYDROCHLORIDE 5 MG/1
5 TABLET ORAL
Status: DISCONTINUED | OUTPATIENT
Start: 2024-01-03 | End: 2024-01-03 | Stop reason: HOSPADM

## 2024-01-03 RX ORDER — CEFAZOLIN SODIUM IN 0.9 % NACL 3 G/100 ML
3 INTRAVENOUS SOLUTION, PIGGYBACK (ML) INTRAVENOUS ONCE
Status: DISCONTINUED | OUTPATIENT
Start: 2024-01-03 | End: 2024-01-03

## 2024-01-03 RX ORDER — BUPIVACAINE HYDROCHLORIDE AND EPINEPHRINE 5; 5 MG/ML; UG/ML
INJECTION, SOLUTION EPIDURAL; INTRACAUDAL; PERINEURAL AS NEEDED
Status: DISCONTINUED | OUTPATIENT
Start: 2024-01-03 | End: 2024-01-03 | Stop reason: HOSPADM

## 2024-01-03 RX ORDER — DEXAMETHASONE SODIUM PHOSPHATE 4 MG/ML
INJECTION, SOLUTION INTRA-ARTICULAR; INTRALESIONAL; INTRAMUSCULAR; INTRAVENOUS; SOFT TISSUE AS NEEDED
Status: DISCONTINUED | OUTPATIENT
Start: 2024-01-03 | End: 2024-01-03 | Stop reason: SURG

## 2024-01-03 RX ORDER — PROMETHAZINE HYDROCHLORIDE 25 MG/1
25 SUPPOSITORY RECTAL ONCE AS NEEDED
Status: DISCONTINUED | OUTPATIENT
Start: 2024-01-03 | End: 2024-01-03 | Stop reason: HOSPADM

## 2024-01-03 RX ORDER — MIDAZOLAM HYDROCHLORIDE 2 MG/2ML
2 INJECTION, SOLUTION INTRAMUSCULAR; INTRAVENOUS ONCE
Status: COMPLETED | OUTPATIENT
Start: 2024-01-03 | End: 2024-01-03

## 2024-01-03 RX ORDER — CEFAZOLIN SODIUM 2 G/100ML
2 INJECTION, SOLUTION INTRAVENOUS ONCE
Status: COMPLETED | OUTPATIENT
Start: 2024-01-03 | End: 2024-01-03

## 2024-01-03 RX ORDER — FENTANYL CITRATE 50 UG/ML
INJECTION, SOLUTION INTRAMUSCULAR; INTRAVENOUS AS NEEDED
Status: DISCONTINUED | OUTPATIENT
Start: 2024-01-03 | End: 2024-01-03 | Stop reason: SURG

## 2024-01-03 RX ORDER — MEPERIDINE HYDROCHLORIDE 25 MG/ML
12.5 INJECTION INTRAMUSCULAR; INTRAVENOUS; SUBCUTANEOUS
Status: DISCONTINUED | OUTPATIENT
Start: 2024-01-03 | End: 2024-01-03 | Stop reason: HOSPADM

## 2024-01-03 RX ORDER — PROPOFOL 10 MG/ML
VIAL (ML) INTRAVENOUS AS NEEDED
Status: DISCONTINUED | OUTPATIENT
Start: 2024-01-03 | End: 2024-01-03 | Stop reason: SURG

## 2024-01-03 RX ORDER — GLYCOPYRROLATE 0.2 MG/ML
INJECTION INTRAMUSCULAR; INTRAVENOUS AS NEEDED
Status: DISCONTINUED | OUTPATIENT
Start: 2024-01-03 | End: 2024-01-03 | Stop reason: SURG

## 2024-01-03 RX ORDER — ACETAMINOPHEN 500 MG
1000 TABLET ORAL ONCE
Status: COMPLETED | OUTPATIENT
Start: 2024-01-03 | End: 2024-01-03

## 2024-01-03 RX ORDER — ONDANSETRON 2 MG/ML
INJECTION INTRAMUSCULAR; INTRAVENOUS AS NEEDED
Status: DISCONTINUED | OUTPATIENT
Start: 2024-01-03 | End: 2024-01-03 | Stop reason: SURG

## 2024-01-03 RX ORDER — PROMETHAZINE HYDROCHLORIDE 12.5 MG/1
25 TABLET ORAL ONCE AS NEEDED
Status: DISCONTINUED | OUTPATIENT
Start: 2024-01-03 | End: 2024-01-03 | Stop reason: HOSPADM

## 2024-01-03 RX ORDER — HYDROCODONE BITARTRATE AND ACETAMINOPHEN 7.5; 325 MG/1; MG/1
1-2 TABLET ORAL EVERY 4 HOURS PRN
Qty: 30 TABLET | Refills: 0 | Status: SHIPPED | OUTPATIENT
Start: 2024-01-03

## 2024-01-03 RX ORDER — LIDOCAINE HYDROCHLORIDE 20 MG/ML
INJECTION, SOLUTION EPIDURAL; INFILTRATION; INTRACAUDAL; PERINEURAL AS NEEDED
Status: DISCONTINUED | OUTPATIENT
Start: 2024-01-03 | End: 2024-01-03 | Stop reason: SURG

## 2024-01-03 RX ORDER — HYDROCODONE BITARTRATE AND ACETAMINOPHEN 7.5; 325 MG/1; MG/1
1 TABLET ORAL ONCE AS NEEDED
Status: DISCONTINUED | OUTPATIENT
Start: 2024-01-03 | End: 2024-01-03 | Stop reason: HOSPADM

## 2024-01-03 RX ADMIN — SODIUM CHLORIDE, POTASSIUM CHLORIDE, SODIUM LACTATE AND CALCIUM CHLORIDE 9 ML/HR: 600; 310; 30; 20 INJECTION, SOLUTION INTRAVENOUS at 07:18

## 2024-01-03 RX ADMIN — FENTANYL CITRATE 25 MCG: 50 INJECTION, SOLUTION INTRAMUSCULAR; INTRAVENOUS at 08:20

## 2024-01-03 RX ADMIN — FENTANYL CITRATE 25 MCG: 50 INJECTION, SOLUTION INTRAMUSCULAR; INTRAVENOUS at 08:17

## 2024-01-03 RX ADMIN — MIDAZOLAM HYDROCHLORIDE 2 MG: 1 INJECTION, SOLUTION INTRAMUSCULAR; INTRAVENOUS at 07:38

## 2024-01-03 RX ADMIN — PROPOFOL 50 MG: 10 INJECTION, EMULSION INTRAVENOUS at 08:38

## 2024-01-03 RX ADMIN — KETOROLAC TROMETHAMINE 15 MG: 30 INJECTION, SOLUTION INTRAMUSCULAR; INTRAVENOUS at 08:37

## 2024-01-03 RX ADMIN — GLYCOPYRROLATE 0.1 MG: 0.2 INJECTION INTRAMUSCULAR; INTRAVENOUS at 07:53

## 2024-01-03 RX ADMIN — FENTANYL CITRATE 50 MCG: 50 INJECTION, SOLUTION INTRAMUSCULAR; INTRAVENOUS at 08:36

## 2024-01-03 RX ADMIN — LIDOCAINE HYDROCHLORIDE 50 MG: 20 INJECTION, SOLUTION EPIDURAL; INFILTRATION; INTRACAUDAL; PERINEURAL at 07:53

## 2024-01-03 RX ADMIN — ACETAMINOPHEN 1000 MG: 500 TABLET ORAL at 07:18

## 2024-01-03 RX ADMIN — FENTANYL CITRATE 50 MCG: 50 INJECTION, SOLUTION INTRAMUSCULAR; INTRAVENOUS at 08:23

## 2024-01-03 RX ADMIN — SODIUM CHLORIDE, POTASSIUM CHLORIDE, SODIUM LACTATE AND CALCIUM CHLORIDE: 600; 310; 30; 20 INJECTION, SOLUTION INTRAVENOUS at 08:33

## 2024-01-03 RX ADMIN — DEXAMETHASONE SODIUM PHOSPHATE 4 MG: 4 INJECTION, SOLUTION INTRAMUSCULAR; INTRAVENOUS at 07:53

## 2024-01-03 RX ADMIN — ONDANSETRON 4 MG: 2 INJECTION INTRAMUSCULAR; INTRAVENOUS at 08:37

## 2024-01-03 RX ADMIN — PROPOFOL 150 MG: 10 INJECTION, EMULSION INTRAVENOUS at 07:53

## 2024-01-03 RX ADMIN — CEFAZOLIN SODIUM 2 G: 2 INJECTION, SOLUTION INTRAVENOUS at 07:59

## 2024-01-03 RX ADMIN — FENTANYL CITRATE 50 MCG: 50 INJECTION, SOLUTION INTRAMUSCULAR; INTRAVENOUS at 07:53

## 2024-01-03 NOTE — ANESTHESIA POSTPROCEDURE EVALUATION
Patient: Carlos Manuel Sanchez    Procedure Summary       Date: 01/03/24 Room / Location: [facility] OR  / [facility]    Anesthesia Start: 0749 Anesthesia Stop: 0850    Procedure: LEFT KNEE ARTHROSCOPY WITH PARTIAL MEDIAL MENISCECTOMY CHONDROPLASTY (Left) Diagnosis:       Preop examination      Acute medial meniscus tear of left knee, subsequent encounter      (Preop examination [Z01.818])      (Acute medial meniscus tear of left knee, subsequent encounter [S83.592D])    Surgeons: Lisa James MD Provider: Joey Norman MD    Anesthesia Type: general ASA Status: 3            Anesthesia Type: general    Vitals  Vitals Value Taken Time   /88 01/03/24 0930   Temp 36.2 °C (97.2 °F) 01/03/24 0935   Pulse 82 01/03/24 0936   Resp 18 01/03/24 0930   SpO2 96 % 01/03/24 0935   Vitals shown include unfiled device data.        Post Anesthesia Care and Evaluation    Patient location during evaluation: bedside  Patient participation: complete - patient participated  Level of consciousness: awake  Pain management: adequate    Airway patency: patent  PONV Status: none  Cardiovascular status: acceptable and stable  Respiratory status: acceptable  Hydration status: acceptable    Comments: An Anesthesiologist personally participated in the most demanding procedures (including induction and emergence if applicable) in the anesthesia plan, monitored the course of anesthesia administration at frequent intervals and remained physically present and available for immediate diagnosis and treatment of emergencies.

## 2024-01-03 NOTE — DISCHARGE INSTRUCTIONS
DISCHARGE INSTRUCTIONS  POST-OPERATIVE  Knee Arthroscopic Surgery      For your surgery you had:  General anesthesia (you may have a sore throat for the first 24 hours)  IV sedation.  Local anesthesia  Monitored anesthesia care  You may experience dizziness, drowsiness, or light-headedness for several hours following surgery/procedure.  Do not stay alone today or tonight.  Limit your activity for 24 hours.  Resume your diet slowly.  Follow whatever special dietary instructions you may have been given by your doctor.  You should not drive or operate machinery or drink alcohol for 24 hours or while you are taking pain medication.  You should not sign legally binding documents.  Post-Operative Day #1  Post-Operative Day #1 is counted as the 1st day after surgery.  Leave ace wrap on day one to aid in the reduction of swelling.  If dressing is too tight it can be rewrapped.  Post-Operative Day #3- SATURDAY  Ace wrap and dressing may be removed.  Put a 4x4 dressing to suture or staple site.  Change dressing once or twice daily until suture or staples are removed.  It is normal to have some redness or irritation around skin sutures or stapes, however, if you have any expanding areas of redness with a persistent fever and increasing pain notify the physician as soon as possible.  On Post-Operative Day #3, you may want to reapply the ace wrap.  Put ace wrap directly over the knee to add compression and aid in swelling reduction.  It is normal to have some swelling down into the calf and ankle after knee arthroscopy.  If you notice a significant amount of swelling or increasing pain in calf area, notify the physician immediately.   Post-Operative Day #3- SATURDAY  You may shower.  During shower, avoid too much water to incision site.  Let water run down leg and then pat dry.  Do not put any ointments on the incision unless directed by surgeon.  Reapply dry dressing to sutures or staple sites.    General Information  Full  weight bearing with crutches is allowed after a standard knee arthroscopy unless instructed otherwise by surgeon.  You may put as much weight on knee as tolerable.    Knee range of motion exercises should be started as early as the day of surgery.  Try to achieve full extension and start working on range of motion and flexion of the knee.  Move the ankle up and down periodically to help reduce swelling as well as reduce blood pooling.  To allow full extension of the knee and prevent blood clots it is very important to put pillows at the level of the mid-calf to the foot.  DO NOT put pillows directly behind knee.                    DISCHARGE INSTRUCTIONS  POST-OPERATIVE   Knee Arthroscopic Surgery      General Instructions  You will receive a prescription for physical therapy, unless otherwise instructed by physician.  Physical therapy is imperative especially after ACL reconstruction and some knee arthroscopies for swelling reduction, knee range of motion and strengthening.  [x] Use ice pack on the knee for 20 minutes on and 20 minutes off.  Never place ice directly on the skin.  By following this, you will cool the knee sufficiently.  Avoid getting dressing wet.  To help reduce swelling and minimize throbbing pain, use pillows to keep the knee elevated above the level of the heart, even while sleeping.  Sit with the leg propped up on a footstool or chair with pillows.  Exercises toes for 10 minutes every hour while awake.  If you are given a prescription for pain medication, take it as prescribed.  If you are able to take over-the-counter anti-inflammatory medications such as Motrin or Aleve, you may take as per package instructions in between the pain medication to help enhance pain control and reduce swelling.  If you are taking the pain medication prescribed, do not take Tylenol too unless you consult with the pharmacist. Generally, the pain medications prescribed have Tylenol in them and too much Tylenol can be  harmful.  You should stop the anti-inflammatory medication if you experience any stomach/GI disturbances.  Consult with your physician or your pharmacist before taking over-the-counter pain medications/anti-inflammatories. It is not uncommon to have a fever post-operatively especially in the first 24-48 hours.  Deep breathing and coughing and early ambulation will help.  Anti-inflammatories can be used for fever reduction also.  If unable to urinate 6 to 8 hours after surgery or urinating frequently in small amounts, notify the physician or go to the nearest Emergency Room.  NOTIFY YOUR PHYSICIAN IF YOU EXPERIENCE:  Numbness of toes.  Inability to move toes.  Extreme coldness, paleness or blue dis-coloration of toes.  Any pain other than from the incision, such as swelling of the leg that blocks circulation of the toes.  Follow verbal instructions from your doctor.  Medications per physician instructions as indicated on Discharge Medication Information Sheet.  You should see  ______________________for follow-up care  on   .  Phone number: _________________________  Missing your appointment/follow-up could lead to serious complications.  If you have an emergency and cannot reach your doctor, go to an Emergency Room nearest your home.

## 2024-01-03 NOTE — OP NOTE
KNEE ARTHROSCOPY WITH MENISCECTOMY  Procedure Report    Patient Name:  Carlos Manuel Sanchez  YOB: 1960    Date of Surgery:  1/3/2024     Indications:  Knee pain failing conservative treatment    Pre-op Diagnosis:   Preop examination [Z01.818]  Acute medial meniscus tear of left knee, subsequent encounter [S83.242D]       Post-Op Diagnosis Codes:     * Preop examination [Z01.818]     * Acute medial meniscus tear of left knee, subsequent encounter [S83.242D] with chondromalacia    Procedure/CPT® Codes:      Procedure(s):  LEFT KNEE ARTHROSCOPY WITH PARTIAL MEDIAL MENISCECTOMY AND MEDIAL FEMORAL CHONDROPLASTY    Staff:  Surgeon(s):  Lisa James MD    Assistant: Carol Rausch    Anesthesia: General    Estimated Blood Loss: minimal    Implants:    Nothing was implanted during the procedure    Specimen:          None        Findings: Complex medial meniscal tear, significant medial femoral osteoarthritis    Complications: None    Description of Procedure: The patient was brought to the operating room and underwent general anesthesia, had preoperative antibiotic, and was prepped and draped in sterile fashion. Standard superolateral outflow was inserted followed by inferolateral scope placement. The scope was inserted. The medial meniscus was torn.  The lateral meniscus was not torn. This medial meniscal tear was débrided back to a stable rim using a series of biters and shi, checked with a probe. The ACL was not torn. There was significant chondromalacia in the medial femoral condyle, chondroplasty was formed using a shaver. Portals were then closed using 3-0 nylon. Half percent Marcaine was injected into the joint. Sterile dressing was applied. The patient was then extubated and taken to recovery in stable condition. No complications.    Assistant: Carol Rausch  was responsible for performing the following activities: Closing and Placing Dressing and their skilled assistance was necessary for  the success of this case.    Lisa James MD     Date: 1/3/2024  Time: 08:37 EST

## 2024-01-10 ENCOUNTER — CLINICAL SUPPORT (OUTPATIENT)
Dept: FAMILY MEDICINE CLINIC | Facility: CLINIC | Age: 64
End: 2024-01-10
Payer: OTHER GOVERNMENT

## 2024-01-10 DIAGNOSIS — E78.5 HYPERLIPIDEMIA LDL GOAL <70: ICD-10-CM

## 2024-01-10 DIAGNOSIS — I10 PRIMARY HYPERTENSION: ICD-10-CM

## 2024-01-10 DIAGNOSIS — E11.65 TYPE 2 DIABETES MELLITUS WITH HYPERGLYCEMIA, WITHOUT LONG-TERM CURRENT USE OF INSULIN: ICD-10-CM

## 2024-01-10 DIAGNOSIS — Z11.59 NEED FOR HEPATITIS C SCREENING TEST: ICD-10-CM

## 2024-01-10 LAB
ALBUMIN SERPL-MCNC: 4 G/DL (ref 3.5–5.2)
ALBUMIN UR-MCNC: <1.2 MG/DL
ALBUMIN/GLOB SERPL: 1.3 G/DL
ALP SERPL-CCNC: 90 U/L (ref 39–117)
ALT SERPL W P-5'-P-CCNC: 28 U/L (ref 1–41)
ANION GAP SERPL CALCULATED.3IONS-SCNC: 10.2 MMOL/L (ref 5–15)
AST SERPL-CCNC: 26 U/L (ref 1–40)
BASOPHILS # BLD AUTO: 0.04 10*3/MM3 (ref 0–0.2)
BASOPHILS NFR BLD AUTO: 0.7 % (ref 0–1.5)
BILIRUB SERPL-MCNC: 0.5 MG/DL (ref 0–1.2)
BUN SERPL-MCNC: 13 MG/DL (ref 8–23)
BUN/CREAT SERPL: 13.7 (ref 7–25)
CALCIUM SPEC-SCNC: 9.5 MG/DL (ref 8.6–10.5)
CHLORIDE SERPL-SCNC: 101 MMOL/L (ref 98–107)
CHOLEST SERPL-MCNC: 173 MG/DL (ref 0–200)
CO2 SERPL-SCNC: 25.8 MMOL/L (ref 22–29)
CREAT SERPL-MCNC: 0.95 MG/DL (ref 0.76–1.27)
CREAT UR-MCNC: 38.4 MG/DL
DEPRECATED RDW RBC AUTO: 38.4 FL (ref 37–54)
EGFRCR SERPLBLD CKD-EPI 2021: 90.5 ML/MIN/1.73
EOSINOPHIL # BLD AUTO: 0.1 10*3/MM3 (ref 0–0.4)
EOSINOPHIL NFR BLD AUTO: 1.8 % (ref 0.3–6.2)
ERYTHROCYTE [DISTWIDTH] IN BLOOD BY AUTOMATED COUNT: 12 % (ref 12.3–15.4)
GLOBULIN UR ELPH-MCNC: 3 GM/DL
GLUCOSE SERPL-MCNC: 114 MG/DL (ref 65–99)
HBA1C MFR BLD: 6.7 % (ref 4.8–5.6)
HCT VFR BLD AUTO: 45.7 % (ref 37.5–51)
HCV AB SER DONR QL: NORMAL
HDLC SERPL-MCNC: 80 MG/DL (ref 40–60)
HGB BLD-MCNC: 15.6 G/DL (ref 13–17.7)
IMM GRANULOCYTES # BLD AUTO: 0.04 10*3/MM3 (ref 0–0.05)
IMM GRANULOCYTES NFR BLD AUTO: 0.7 % (ref 0–0.5)
LDLC SERPL CALC-MCNC: 83 MG/DL (ref 0–100)
LDLC/HDLC SERPL: 1.04 {RATIO}
LYMPHOCYTES # BLD AUTO: 1 10*3/MM3 (ref 0.7–3.1)
LYMPHOCYTES NFR BLD AUTO: 17.9 % (ref 19.6–45.3)
MCH RBC QN AUTO: 30.1 PG (ref 26.6–33)
MCHC RBC AUTO-ENTMCNC: 34.1 G/DL (ref 31.5–35.7)
MCV RBC AUTO: 88.2 FL (ref 79–97)
MICROALBUMIN/CREAT UR: NORMAL MG/G{CREAT}
MONOCYTES # BLD AUTO: 0.77 10*3/MM3 (ref 0.1–0.9)
MONOCYTES NFR BLD AUTO: 13.8 % (ref 5–12)
NEUTROPHILS NFR BLD AUTO: 3.65 10*3/MM3 (ref 1.7–7)
NEUTROPHILS NFR BLD AUTO: 65.1 % (ref 42.7–76)
NRBC BLD AUTO-RTO: 0 /100 WBC (ref 0–0.2)
PLATELET # BLD AUTO: 231 10*3/MM3 (ref 140–450)
PMV BLD AUTO: 9.8 FL (ref 6–12)
POTASSIUM SERPL-SCNC: 4.5 MMOL/L (ref 3.5–5.2)
PROT SERPL-MCNC: 7 G/DL (ref 6–8.5)
RBC # BLD AUTO: 5.18 10*6/MM3 (ref 4.14–5.8)
SODIUM SERPL-SCNC: 137 MMOL/L (ref 136–145)
TRIGL SERPL-MCNC: 51 MG/DL (ref 0–150)
VLDLC SERPL-MCNC: 10 MG/DL (ref 5–40)
WBC NRBC COR # BLD AUTO: 5.6 10*3/MM3 (ref 3.4–10.8)

## 2024-01-10 PROCEDURE — 80061 LIPID PANEL: CPT | Performed by: FAMILY MEDICINE

## 2024-01-10 PROCEDURE — 85025 COMPLETE CBC W/AUTO DIFF WBC: CPT | Performed by: FAMILY MEDICINE

## 2024-01-10 PROCEDURE — 36415 COLL VENOUS BLD VENIPUNCTURE: CPT | Performed by: FAMILY MEDICINE

## 2024-01-10 PROCEDURE — 86803 HEPATITIS C AB TEST: CPT | Performed by: FAMILY MEDICINE

## 2024-01-10 PROCEDURE — 83036 HEMOGLOBIN GLYCOSYLATED A1C: CPT | Performed by: FAMILY MEDICINE

## 2024-01-10 PROCEDURE — 82570 ASSAY OF URINE CREATININE: CPT | Performed by: FAMILY MEDICINE

## 2024-01-10 PROCEDURE — 82043 UR ALBUMIN QUANTITATIVE: CPT | Performed by: FAMILY MEDICINE

## 2024-01-10 PROCEDURE — 80053 COMPREHEN METABOLIC PANEL: CPT | Performed by: FAMILY MEDICINE

## 2024-01-17 ENCOUNTER — OFFICE VISIT (OUTPATIENT)
Dept: FAMILY MEDICINE CLINIC | Facility: CLINIC | Age: 64
End: 2024-01-17
Payer: OTHER GOVERNMENT

## 2024-01-17 VITALS
TEMPERATURE: 97.9 F | WEIGHT: 202.2 LBS | SYSTOLIC BLOOD PRESSURE: 118 MMHG | BODY MASS INDEX: 28.95 KG/M2 | DIASTOLIC BLOOD PRESSURE: 78 MMHG | OXYGEN SATURATION: 96 % | HEIGHT: 70 IN | HEART RATE: 104 BPM

## 2024-01-17 DIAGNOSIS — I48.19 ATRIAL FIBRILLATION, PERSISTENT: ICD-10-CM

## 2024-01-17 DIAGNOSIS — S83.232A COMPLEX TEAR OF MEDIAL MENISCUS OF LEFT KNEE AS CURRENT INJURY, INITIAL ENCOUNTER: ICD-10-CM

## 2024-01-17 DIAGNOSIS — I10 ESSENTIAL HYPERTENSION: Primary | ICD-10-CM

## 2024-01-17 DIAGNOSIS — K63.5 POLYP OF COLON, UNSPECIFIED PART OF COLON, UNSPECIFIED TYPE: ICD-10-CM

## 2024-01-17 DIAGNOSIS — E78.00 PURE HYPERCHOLESTEROLEMIA: ICD-10-CM

## 2024-01-17 DIAGNOSIS — I25.10 CAD S/P PERCUTANEOUS CORONARY ANGIOPLASTY: ICD-10-CM

## 2024-01-17 DIAGNOSIS — Z12.5 SCREENING FOR PROSTATE CANCER: ICD-10-CM

## 2024-01-17 DIAGNOSIS — E11.65 TYPE 2 DIABETES MELLITUS WITH HYPERGLYCEMIA, WITHOUT LONG-TERM CURRENT USE OF INSULIN: ICD-10-CM

## 2024-01-17 DIAGNOSIS — L50.2 URTICARIA DUE TO COLD: ICD-10-CM

## 2024-01-17 DIAGNOSIS — Z98.61 CAD S/P PERCUTANEOUS CORONARY ANGIOPLASTY: ICD-10-CM

## 2024-01-17 NOTE — PROGRESS NOTES
Chief Complaint  Diabetes  Atrial fibrillation      Subjective        Carlos Manuel Sanchez presents to [facility]  History of Present Illness  Patient presents today to follow-up for diabetes and atrial fibrillation.  Since last time I saw him he has seen cardiology.  They had discussed a cardioversion however patient held off at that time as he did have a passing in his family.  He will consider this again at his follow-up visit in March.  In the meantime he continues to take metoprolol 50 mg daily.  We previously discussed taking an extra half tablet if his heart rate is greater than 110.  He has not needed to use this.  He continues to be anticoagulated with Eliquis.  For hypertension he continues to take lisinopril 20 mg daily as well as metoprolol 50 mg daily.  Blood pressure has been adequately controlled.  He had labs done prior to the appointment so we reviewed these.  No microalbumin noted in the urine.  He tested negative for hepatitis C.  Lipid panel showed the LDL at 83 with HDL at 80.  He is taking Crestor 20 mg daily as well as Repatha 140 mg twice weekly.  His A1c is 6.7% which has slightly increased.  He does attribute this to the holidays and reports that he will work on lowering this.  He is not currently on any medication for diabetes.  We discussed continuing to work on dietary changes.  His kidney function liver enzymes look good.  Blood counts look good with no anemia, normal white blood cell count and platelets.  He does have issues with cold induced urticaria however he reports that they resolve rather quickly.  He has not seen much benefit using the Claritin.  I did discuss with him that we could increase the dose to taking up to 30 mg or even 40 mg of Claritin however he would like to hold off at this time.  His symptoms have been manageable without medication.  He did see orthopedics recently and had surgery for his left knee.  This included a left knee  "arthroscopy with partial medial meniscectomy and medial femoral chondroplasty.  He will have follow-up again with orthopedics tomorrow.  He still has stitches in place.  Objective   Vital Signs:  /78 (BP Location: Left arm, Patient Position: Sitting)   Pulse 104   Temp 97.9 °F (36.6 °C) (Oral)   Ht 177.8 cm (70\")   Wt 91.7 kg (202 lb 3.2 oz)   SpO2 96%   BMI 29.01 kg/m²   Estimated body mass index is 29.01 kg/m² as calculated from the following:    Height as of this encounter: 177.8 cm (70\").    Weight as of this encounter: 91.7 kg (202 lb 3.2 oz).               Physical Exam  Vitals reviewed.   Constitutional:       Appearance: Normal appearance.   HENT:      Head: Normocephalic and atraumatic.      Right Ear: External ear normal.      Left Ear: External ear normal.      Mouth/Throat:      Pharynx: No oropharyngeal exudate.   Eyes:      Conjunctiva/sclera: Conjunctivae normal.   Cardiovascular:      Rate and Rhythm: Normal rate. Rhythm irregular.      Heart sounds: No murmur heard.     No friction rub. No gallop.   Pulmonary:      Effort: Pulmonary effort is normal.      Breath sounds: Normal breath sounds. No wheezing or rhonchi.   Abdominal:      General: Bowel sounds are normal. There is no distension.      Palpations: Abdomen is soft.      Tenderness: There is no abdominal tenderness.   Skin:     Comments: 3 stitches in place at the incision sites on his left knee.  No evidence of cellulitis.   Neurological:      Mental Status: He is alert and oriented to person, place, and time.      Cranial Nerves: No cranial nerve deficit.   Psychiatric:         Mood and Affect: Mood and affect normal.         Behavior: Behavior normal.         Thought Content: Thought content normal.         Judgment: Judgment normal.        Result Review :                     Assessment and Plan     Diagnoses and all orders for this visit:    1. Essential hypertension (Primary)    2. Urticaria due to cold    3. Pure " hypercholesterolemia  -     Lipid Panel; Future    4. Atrial fibrillation, persistent    5. CAD S/P percutaneous coronary angioplasty  -     CBC & Differential; Future  -     Comprehensive Metabolic Panel; Future  -     Lipid Panel; Future    6. Complex tear of medial meniscus of left knee as current injury, initial encounter    7. Type 2 diabetes mellitus with hyperglycemia, without long-term current use of insulin  -     CBC & Differential; Future  -     Comprehensive Metabolic Panel; Future  -     Hemoglobin A1c; Future    8. Screening for prostate cancer  -     PSA Screen; Future    9. Polyp of colon, unspecified part of colon, unspecified type    I discussed with patient having labs repeated again in 6 months.  Patient instructed to call with any questions or concerns.  We discussed continuing current management for hypertension as well as atrial fibrillation.  We discussed continuing current management for hyperlipidemia.  As far as diabetes is concerned patient will continue to work on lifestyle changes.  Plan to reassess at his follow-up appointment.  We did discuss adding medication should his A1c get to 7% or greater.         Follow Up     Return in about 6 months (around 7/17/2024) for diabetes.  Patient was given instructions and counseling regarding his condition or for health maintenance advice. Please see specific information pulled into the AVS if appropriate.

## 2024-01-18 ENCOUNTER — OFFICE VISIT (OUTPATIENT)
Dept: ORTHOPEDIC SURGERY | Facility: CLINIC | Age: 64
End: 2024-01-18
Payer: OTHER GOVERNMENT

## 2024-01-18 VITALS
BODY MASS INDEX: 28.92 KG/M2 | WEIGHT: 202 LBS | SYSTOLIC BLOOD PRESSURE: 130 MMHG | HEART RATE: 84 BPM | HEIGHT: 70 IN | OXYGEN SATURATION: 97 % | DIASTOLIC BLOOD PRESSURE: 78 MMHG

## 2024-01-18 DIAGNOSIS — Z98.890 S/P LEFT KNEE ARTHROSCOPY: Primary | ICD-10-CM

## 2024-01-18 RX ORDER — HYDROCODONE BITARTRATE AND ACETAMINOPHEN 5; 325 MG/1; MG/1
1 TABLET ORAL EVERY 6 HOURS PRN
Qty: 28 TABLET | Refills: 0 | Status: SHIPPED | OUTPATIENT
Start: 2024-01-18

## 2024-01-18 NOTE — PROGRESS NOTES
"Chief Complaint  Follow-up of the Left Knee    Subjective      Carlos Manuel Sanchez presents to [facility] for 2-week follow-up of left knee arthroscopy with partial medial meniscectomy and medial femoral chondroplasty with Dr. James on 1/3/2024.  Patient has been doing home exercises and doing well with this.  Reports that he does still have pain in the knee, he has difficulty with walking downhill and reports that sometimes the pain is random, sometimes it is brought on by certain activities.  He is taking his pain medication as needed and needs a refill today.  He presents today without use of assistive devices.    Objective   No Known Allergies    Vital Signs:   /78   Pulse 84   Ht 177.8 cm (70\")   Wt 91.6 kg (202 lb)   SpO2 97%   BMI 28.98 kg/m²       Physical Exam    Constitutional: Awake, alert. Well nourished appearance.    Integumentary: Warm, dry, intact. No obvious rashes.    HENT: Atraumatic, normocephalic.   Respiratory: Non labored respirations .   Cardiovascular: Intact peripheral pulses.    Psychiatric: Normal mood and affect. A&O X3    Ortho Exam  Left knee: Incisions are well-approximated and healing appropriately.  There is no redness or drainage.  Incisions are tender to palpation the medial lateral joint line.  Mild edema noted at the medial and lateral joint line.  Stable to varus and valgus stress.  Stable to anterior and posterior drawer test.  Neurovascular intact.  Sensation is intact.  Range of motion 0 to 115 degrees.    Imaging Results (Most Recent)       None                      Assessment and Plan   Problem List Items Addressed This Visit    None  Visit Diagnoses       S/P left knee arthroscopy    -  Primary            Follow Up   Return in about 4 weeks (around 2/15/2024).    Patient is a non-smoker, did not discuss options for smoking cessation.     Social History     Socioeconomic History    Marital status:    Tobacco Use    Smoking " status: Never    Smokeless tobacco: Never    Tobacco comments:     Never use   Vaping Use    Vaping Use: Never used   Substance and Sexual Activity    Alcohol use: Yes     Alcohol/week: 10.0 standard drinks of alcohol     Types: 10 Cans of beer per week     Comment: Social    Drug use: Never    Sexual activity: Defer       Patient Instructions   Sutures removed in office. Patient educated on incision care.  Please keep incision clean and dry.  Do not soak or submerge in water until incision is fully healed.  Do not apply creams or lotions over the incision.      Continue icing as needed to help with pain and swelling.  Ice knee up to 3 or 4 times daily for no longer than 15 to 20 minutes at a time.    Continue PT to progress ROM and range of motion.  Home exercises given today in office.    Follow-up in 4 weeks to evaluate pain and progress with home exercises.  May consider physical therapy at this time.  No x-rays needed.  Please call with questions or concerns.   Patient was given instructions and counseling regarding his condition or for health maintenance advice. Please see specific information pulled into the AVS if appropriate.

## 2024-01-18 NOTE — PATIENT INSTRUCTIONS
Sutures removed in office. Patient educated on incision care.  Please keep incision clean and dry.  Do not soak or submerge in water until incision is fully healed.  Do not apply creams or lotions over the incision.      Continue icing as needed to help with pain and swelling.  Ice knee up to 3 or 4 times daily for no longer than 15 to 20 minutes at a time.    Continue PT to progress ROM and range of motion.  Home exercises given today in office.    Follow-up in 4 weeks to evaluate pain and progress with home exercises.  May consider physical therapy at this time.  No x-rays needed.  Please call with questions or concerns.

## 2024-02-15 ENCOUNTER — OFFICE VISIT (OUTPATIENT)
Dept: ORTHOPEDIC SURGERY | Facility: CLINIC | Age: 64
End: 2024-02-15
Payer: OTHER GOVERNMENT

## 2024-02-15 ENCOUNTER — TELEPHONE (OUTPATIENT)
Dept: ORTHOPEDIC SURGERY | Facility: CLINIC | Age: 64
End: 2024-02-15

## 2024-02-15 VITALS
BODY MASS INDEX: 28.92 KG/M2 | HEART RATE: 93 BPM | SYSTOLIC BLOOD PRESSURE: 129 MMHG | OXYGEN SATURATION: 96 % | HEIGHT: 70 IN | WEIGHT: 202 LBS | DIASTOLIC BLOOD PRESSURE: 85 MMHG

## 2024-02-15 DIAGNOSIS — Z98.890 S/P LEFT KNEE ARTHROSCOPY: Primary | ICD-10-CM

## 2024-02-15 RX ORDER — HYDROCODONE BITARTRATE AND ACETAMINOPHEN 5; 325 MG/1; MG/1
1 TABLET ORAL EVERY 6 HOURS PRN
Qty: 28 TABLET | Refills: 0 | Status: SHIPPED | OUTPATIENT
Start: 2024-02-15

## 2024-02-28 ENCOUNTER — TELEPHONE (OUTPATIENT)
Dept: ORTHOPEDIC SURGERY | Facility: CLINIC | Age: 64
End: 2024-02-28
Payer: OTHER GOVERNMENT

## 2024-02-28 NOTE — TELEPHONE ENCOUNTER
----- Message from Martine Escobar sent at 2/27/2024  9:06 AM EST -----  Patient has been approved for left knee Synvisc One for dates:  02/14/24 to 02/13/25.  Auth #:  [id number].  Okay to schedule when appropriate.

## 2024-02-28 NOTE — TELEPHONE ENCOUNTER
SENT MSG THRU MY CHART:  2-28-24    YOUR APPT WILL BE ON:   DATE:   3-7-24  TIME:   8:30 AM  REASON:  LT KNEE SYNVISC ONE INJECTION  PROVIDER:  KORI HEBERT  ADDRESS:  [address] [place] [address] , [place] [address]    LAST LT KNEE STEROID INJ:  11-28-23        IF YOU HAVE ANY QUESTIONS REGARDING YOUR APPOINTMENT, PLEASE CALL US [phone].  THANKS

## 2024-03-07 ENCOUNTER — OFFICE VISIT (OUTPATIENT)
Dept: ORTHOPEDIC SURGERY | Facility: CLINIC | Age: 64
End: 2024-03-07
Payer: OTHER GOVERNMENT

## 2024-03-07 VITALS
WEIGHT: 204 LBS | BODY MASS INDEX: 29.2 KG/M2 | HEART RATE: 98 BPM | HEIGHT: 70 IN | OXYGEN SATURATION: 97 % | SYSTOLIC BLOOD PRESSURE: 118 MMHG | DIASTOLIC BLOOD PRESSURE: 83 MMHG

## 2024-03-07 DIAGNOSIS — Z98.890 S/P LEFT KNEE ARTHROSCOPY: Primary | ICD-10-CM

## 2024-03-07 DIAGNOSIS — M25.562 CHRONIC PAIN OF LEFT KNEE: ICD-10-CM

## 2024-03-07 DIAGNOSIS — G89.29 CHRONIC PAIN OF LEFT KNEE: ICD-10-CM

## 2024-03-07 NOTE — PROGRESS NOTES
"Chief Complaint  Follow-up and Pain of the Left Knee    Subjective      Carlos Manuel Sanchez presents to [facility] for follow-up of left knee pain.  Patient had a left knee arthroscopy with partial medial meniscectomy and medial femoral chondroplasty with Dr. Avitia 1/3/2024.  He has continued to have knee pain and a popping/grinding sensation on the medial side of his knee.  He has extreme pain with walking downhill and feels that the knee has a lot of swelling and inflammation.  He is here today to receive a Synvisc injection which has been approved by insurance.    Objective   No Known Allergies    Vital Signs:   /83   Pulse 98   Ht 177.8 cm (70\")   Wt 92.5 kg (204 lb)   SpO2 97%   BMI 29.27 kg/m²       Physical Exam    Constitutional: Awake, alert. Well nourished appearance.    Integumentary: Warm, dry, intact. No obvious rashes.    HENT: Atraumatic, normocephalic.   Respiratory: Non labored respirations .   Cardiovascular: Intact peripheral pulses.    Psychiatric: Normal mood and affect. A&O X3    Ortho Exam  Left knee: Incisions are completely healed and well-approximated.  There is no redness, drainage or tenderness.  Mild edema generalized over the knee.  Stable to varus and valgus stress.  Stable to anterior posterior drawer test.  Neurovascular intact.  Sensation is intact.  Range of motion 0 to 115 degrees.  Negative Lachman's.  Negative John's.    Imaging Results (Most Recent)       None             Large Joint Arthrocentesis: L knee  Date/Time: 3/7/2024 8:23 AM  Consent given by: patient  Site marked: site marked  Timeout: Immediately prior to procedure a time out was called to verify the correct patient, procedure, equipment, support staff and site/side marked as required   Supporting Documentation  Indications: pain   Procedure Details  Location: knee - L knee  Needle gauge: 21 G.  Medications administered: 48 mg hylan 48 MG/6ML  Patient tolerance: patient " tolerated the procedure well with no immediate complications              Assessment and Plan   Problem List Items Addressed This Visit    None  Visit Diagnoses       S/P left knee arthroscopy    -  Primary    Chronic pain of left knee        Relevant Orders    Large Joint Arthrocentesis: L knee            Follow Up   Return in about 6 weeks (around 4/18/2024).    Patient is a non-smoker, did not discuss options for smoking cessation.     Social History     Socioeconomic History    Marital status:    Tobacco Use    Smoking status: Never    Smokeless tobacco: Never    Tobacco comments:     Never use   Vaping Use    Vaping status: Never Used   Substance and Sexual Activity    Alcohol use: Yes     Alcohol/week: 10.0 standard drinks of alcohol     Types: 10 Cans of beer per week     Comment: Social    Drug use: Never    Sexual activity: Defer       Patient Instructions   Patient received Synvisc injection today in office into the left knee and tolerated the procedure well without complication.  Counseled that these injections can be given every 6 months and 1 day with insurance approval. Pt can also receive steroid injections intermittently between these doses.  Steroid injections can be given every 3 months.    Follow up in 6 weeks to evaluate effectiveness of injection. Call with questions, concerns, or worsening symptoms.   Patient was given instructions and counseling regarding his condition or for health maintenance advice. Please see specific information pulled into the AVS if appropriate.

## 2024-03-17 NOTE — PATIENT INSTRUCTIONS
Patient received Synvisc injection today in office into the left knee and tolerated the procedure well without complication.  Counseled that these injections can be given every 6 months and 1 day with insurance approval. Pt can also receive steroid injections intermittently between these doses.  Steroid injections can be given every 3 months.    Follow up in 6 weeks to evaluate effectiveness of injection. Call with questions, concerns, or worsening symptoms.

## 2024-03-21 ENCOUNTER — TELEPHONE (OUTPATIENT)
Dept: CARDIOLOGY | Facility: CLINIC | Age: 64
End: 2024-03-21

## 2024-03-21 ENCOUNTER — OFFICE VISIT (OUTPATIENT)
Dept: CARDIOLOGY | Facility: CLINIC | Age: 64
End: 2024-03-21
Payer: OTHER GOVERNMENT

## 2024-03-21 ENCOUNTER — PREP FOR SURGERY (OUTPATIENT)
Dept: OTHER | Facility: HOSPITAL | Age: 64
End: 2024-03-21
Payer: OTHER GOVERNMENT

## 2024-03-21 VITALS
SYSTOLIC BLOOD PRESSURE: 120 MMHG | WEIGHT: 206 LBS | DIASTOLIC BLOOD PRESSURE: 76 MMHG | BODY MASS INDEX: 29.49 KG/M2 | HEIGHT: 70 IN | HEART RATE: 96 BPM

## 2024-03-21 DIAGNOSIS — I10 PRIMARY HYPERTENSION: ICD-10-CM

## 2024-03-21 DIAGNOSIS — I48.19 ATRIAL FIBRILLATION, PERSISTENT: Primary | ICD-10-CM

## 2024-03-21 DIAGNOSIS — Z98.61 CAD S/P PERCUTANEOUS CORONARY ANGIOPLASTY: ICD-10-CM

## 2024-03-21 DIAGNOSIS — E78.5 HYPERLIPIDEMIA LDL GOAL <70: Primary | ICD-10-CM

## 2024-03-21 DIAGNOSIS — I25.10 CAD S/P PERCUTANEOUS CORONARY ANGIOPLASTY: ICD-10-CM

## 2024-03-21 DIAGNOSIS — I48.19 ATRIAL FIBRILLATION, PERSISTENT: ICD-10-CM

## 2024-03-21 RX ORDER — EVOLOCUMAB 140 MG/ML
140 INJECTION, SOLUTION SUBCUTANEOUS
Qty: 6 ML | Refills: 3 | Status: SHIPPED | OUTPATIENT
Start: 2024-03-21

## 2024-03-21 RX ORDER — SODIUM CHLORIDE 9 MG/ML
40 INJECTION, SOLUTION INTRAVENOUS AS NEEDED
OUTPATIENT
Start: 2024-03-21

## 2024-03-21 RX ORDER — SODIUM CHLORIDE 0.9 % (FLUSH) 0.9 %
10 SYRINGE (ML) INJECTION AS NEEDED
OUTPATIENT
Start: 2024-03-21

## 2024-03-21 RX ORDER — ROSUVASTATIN CALCIUM 20 MG/1
20 TABLET, COATED ORAL DAILY
Qty: 90 TABLET | Refills: 3 | Status: SHIPPED | OUTPATIENT
Start: 2024-03-21

## 2024-03-21 RX ORDER — SODIUM CHLORIDE 0.9 % (FLUSH) 0.9 %
3 SYRINGE (ML) INJECTION EVERY 12 HOURS SCHEDULED
OUTPATIENT
Start: 2024-03-21

## 2024-03-21 NOTE — ASSESSMENT & PLAN NOTE
Patient with some symptomatic fatigability possibly related to his atrial fibrillation is doing reasonably controlled his heart rate discussed with him again potential for a trial of cardioversion he was agreeable after going to risk benefits alternatives.  He has been on his Eliquis continuously for over a month so no need for LUIS CARLOS we will plan on scheduling cardioversion next Tuesday

## 2024-03-21 NOTE — ASSESSMENT & PLAN NOTE
Patient's LDL has gone up mildly to 80 he is taking Repatha and Crestor 20 nightly we will hold off on further uptitration of his Crestor and repeat lipids at next visit

## 2024-03-21 NOTE — ASSESSMENT & PLAN NOTE
Patient blood pressure adequate control with the lisinopril 20 mg once a day and Toprol 50 chelators seen

## 2024-03-21 NOTE — TELEPHONE ENCOUNTER
I spoke to patients wife and gave an arrival time of 7:00 on 03/26/24 for Cardioversion. Patient was instructed to have a  for the day of the procedure and to arrive at the main entrance/registration area. Patient was instructed to continue all medications as usual. Patient was instructed to be NPO after midnight with sips of water as needed. Patient is agreeable with no other questions or concerns.

## 2024-03-21 NOTE — PROGRESS NOTES
Chief Complaint  Follow-up, Coronary Artery Disease, Atrial Fibrillation, Hypertension, and Hyperlipidemia    Subjective    Patient reports still fatigability with exertion no chest pain problems he has been compliant with his medications and no symptoms of myalgias  Past Medical History:   Diagnosis Date    Arthritis     Atrial fibrillation     Borderline diabetic     Coronary artery disease     BLOCKAGE 1 STENT PLACED 2000, SEE'S DR WARD, DENIED CP/SOA    HTN (hypertension)     Hyperlipemia     Rotator cuff syndrome 09/2022    When pulling back compound bow during hunting season    Rotator cuff tear, right          Current Outpatient Medications:     apixaban (ELIQUIS) 5 MG tablet tablet, Take 1 tablet by mouth 2 (Two) Times a Day., Disp: 180 tablet, Rfl: 3    aspirin 81 MG EC tablet, Take  by mouth Daily. LAST DOSE 12/26/23 PER DR WARD, Disp: , Rfl:     B Complex-Biotin-FA (B Complete) tablet, Take  by mouth., Disp: , Rfl:     Evolocumab (Repatha SureClick) solution auto-injector SureClick injection, Inject 1 mL under the skin into the appropriate area as directed Every 14 (Fourteen) Days., Disp: 6 mL, Rfl: 3    lisinopril (PRINIVIL,ZESTRIL) 20 MG tablet, TAKE 1 TABLET DAILY, Disp: 90 tablet, Rfl: 3    metoprolol succinate XL (TOPROL-XL) 50 MG 24 hr tablet, Take 1 tablet PO daily. May take extra 1/2 tablet daily prn if HR is greater than 110, Disp: 90 tablet, Rfl: 3    rosuvastatin (CRESTOR) 20 MG tablet, Take 1 tablet by mouth Daily., Disp: 90 tablet, Rfl: 3    sildenafil (VIAGRA) 100 MG tablet, Take 1 tablet by mouth Daily As Needed., Disp: , Rfl:     loratadine (Claritin) 10 MG tablet, Take 1 tablet by mouth 2 (Two) Times a Day As Needed (urticaria). (Patient not taking: Reported on 3/21/2024), Disp: 180 tablet, Rfl: 3    Medications Discontinued During This Encounter   Medication Reason    HYDROcodone-acetaminophen (NORCO) 5-325 MG per tablet     rosuvastatin (CRESTOR) 20 MG tablet Reorder    Repatha  "SureClick solution auto-injector SureClick injection Reorder    apixaban (ELIQUIS) 5 MG tablet tablet Reorder     No Known Allergies     Social History     Tobacco Use    Smoking status: Never    Smokeless tobacco: Never    Tobacco comments:     Never use   Vaping Use    Vaping status: Never Used   Substance Use Topics    Alcohol use: Yes     Alcohol/week: 10.0 standard drinks of alcohol     Types: 10 Cans of beer per week     Comment: Social    Drug use: Never       Family History   Problem Relation Age of Onset    Heart disease Mother     Heart disease Father     Cancer Father         Lung cancer    Heart disease Sister     Heart disease Brother     Lung cancer Other     Malig Hyperthermia Neg Hx         Objective     /76   Pulse 96   Ht 177.8 cm (70\")   Wt 93.4 kg (206 lb)   BMI 29.56 kg/m²       Physical Exam    General Appearance:   no acute distress  Alert and oriented x3  HENT:   lips not cyanotic  Atraumatic  Neck:  No jvd   supple  Respiratory:  no respiratory distress  normal breath sounds  no rales  Cardiovascular:  Regular rate and rhythm  no S3, no S4   no murmur  no rub  Extremities  No cyanosis  lower extremity edema: none    Skin:   warm, dry  No rashes      Result Review :     proBNP   Date Value Ref Range Status   04/28/2023 381.5 0.0 - 900.0 pg/mL Final     CMP          7/11/2023    08:33 1/3/2024    06:29 1/10/2024    08:54   CMP   Glucose 122  124  114    BUN 13  14  13    Creatinine 0.98  0.89  0.95    EGFR 87.7  96.9  90.5    Sodium 138  132  137    Potassium 4.5  4.3  4.5    Chloride 104  99  101    Calcium 9.7  9.2  9.5    Total Protein 6.9   7.0    Albumin 4.1   4.0    Globulin 2.8   3.0    Total Bilirubin 0.6   0.5    Alkaline Phosphatase 83   90    AST (SGOT) 26   26    ALT (SGPT) 38   28    Albumin/Globulin Ratio 1.5   1.3    BUN/Creatinine Ratio 13.3  15.7  13.7    Anion Gap 9.1  6.8  10.2      CBC w/diff          4/28/2023    09:06 7/11/2023    08:33 1/10/2024    08:54   CBC " "w/Diff   WBC 10.60  5.89  5.60    RBC 5.74  5.75  5.18    Hemoglobin 16.9  16.6  15.6    Hematocrit 49.1  49.6  45.7    MCV 85.5  86.3  88.2    MCH 29.4  28.9  30.1    MCHC 34.4  33.5  34.1    RDW 12.1  12.3  12.0    Platelets 206  193  231    Neutrophil Rel % 79.4  66.4  65.1    Immature Granulocyte Rel % 0.5  0.5  0.7    Lymphocyte Rel % 10.0  19.0  17.9    Monocyte Rel % 9.2  12.1  13.8    Eosinophil Rel % 0.6  1.7  1.8    Basophil Rel % 0.3  0.3  0.7       No results found for: \"TSH\"   No results found for: \"FREET4\"   No results found for: \"DDIMERQUANT\"  Magnesium   Date Value Ref Range Status   04/28/2023 2.0 1.6 - 2.4 mg/dL Final      No results found for: \"DIGOXIN\"   Lab Results   Component Value Date    TROPONINT 13 04/28/2023           Lipid Panel          7/11/2023    08:33 1/10/2024    08:54   Lipid Panel   Total Cholesterol 142  173    Triglycerides 58  51    HDL Cholesterol 71  80    VLDL Cholesterol 12  10    LDL Cholesterol  59  83    LDL/HDL Ratio 0.84  1.04      No results found for: \"POCTROP\"    Results for orders placed in visit on 05/03/23    Adult Transthoracic Echo Complete W/ Cont if Necessary Per Protocol    Interpretation Summary    The study is technically difficult for diagnosis.    Left ventricular systolic function is normal. Calculated left ventricular EF = 56%    Left ventricular diastolic function was normal.    The left atrial cavity is moderately dilated.    The right atrial cavity is mildly  dilated.    There is mild calcification of the aortic valve.    Estimated right ventricular systolic pressure from tricuspid regurgitation is normal (<35 mmHg).    The left atrial cavity is moderately dilated.                 Diagnoses and all orders for this visit:    1. Hyperlipidemia LDL goal <70 (Primary)  Assessment & Plan:  Patient's LDL has gone up mildly to 80 he is taking Repatha and Crestor 20 nightly we will hold off on further uptitration of his Crestor and repeat lipids at next " visit     Orders:  -     Evolocumab (Repatha SureClick) solution auto-injector SureClick injection; Inject 1 mL under the skin into the appropriate area as directed Every 14 (Fourteen) Days.  Dispense: 6 mL; Refill: 3  -     rosuvastatin (CRESTOR) 20 MG tablet; Take 1 tablet by mouth Daily.  Dispense: 90 tablet; Refill: 3    2. Atrial fibrillation, persistent  Assessment & Plan:  Patient with some symptomatic fatigability possibly related to his atrial fibrillation is doing reasonably controlled his heart rate discussed with him again potential for a trial of cardioversion he was agreeable after going to risk benefits alternatives.  He has been on his Eliquis continuously for over a month so no need for LUIS CARLOS we will plan on scheduling cardioversion next Tuesday    Orders:  -     apixaban (ELIQUIS) 5 MG tablet tablet; Take 1 tablet by mouth 2 (Two) Times a Day.  Dispense: 180 tablet; Refill: 3    3. CAD S/P percutaneous coronary angioplasty  Assessment & Plan:  Patient is doing well no ongoing angina continue with chronic aspirin 81 mg daily        4. Primary hypertension  Assessment & Plan:  Patient blood pressure adequate control with the lisinopril 20 mg once a day and Toprol 50 chelators seen               Follow Up     Return in about 6 months (around 9/21/2024) for Follow with Sarah White.          Patient was given instructions and counseling regarding his condition or for health maintenance advice. Please see specific information pulled into the AVS if appropriate.

## 2024-03-26 ENCOUNTER — HOSPITAL ENCOUNTER (OUTPATIENT)
Dept: CARDIOLOGY | Facility: HOSPITAL | Age: 64
Discharge: HOME | End: 2024-03-26
Payer: OTHER GOVERNMENT

## 2024-03-26 VITALS
DIASTOLIC BLOOD PRESSURE: 62 MMHG | RESPIRATION RATE: 14 BRPM | HEART RATE: 73 BPM | SYSTOLIC BLOOD PRESSURE: 96 MMHG | OXYGEN SATURATION: 94 %

## 2024-03-26 DIAGNOSIS — I48.19 ATRIAL FIBRILLATION, PERSISTENT: ICD-10-CM

## 2024-03-26 LAB
ANION GAP SERPL CALCULATED.3IONS-SCNC: 7.9 MMOL/L (ref 5–15)
BUN SERPL-MCNC: 11 MG/DL (ref 8–23)
BUN/CREAT SERPL: 12.5 (ref 7–25)
CALCIUM SPEC-SCNC: 9.3 MG/DL (ref 8.6–10.5)
CHLORIDE SERPL-SCNC: 101 MMOL/L (ref 98–107)
CO2 SERPL-SCNC: 26.1 MMOL/L (ref 22–29)
CREAT SERPL-MCNC: 0.88 MG/DL (ref 0.76–1.27)
EGFRCR SERPLBLD CKD-EPI 2021: 97.2 ML/MIN/1.73
GLUCOSE SERPL-MCNC: 142 MG/DL (ref 65–99)
INR PPP: 1.06 (ref 0.86–1.15)
POTASSIUM SERPL-SCNC: 4.9 MMOL/L (ref 3.5–5.2)
PROTHROMBIN TIME: 14 SECONDS (ref 11.8–14.9)
SODIUM SERPL-SCNC: 135 MMOL/L (ref 136–145)

## 2024-03-26 PROCEDURE — 25010000002 MEPERIDINE PER 100 MG: Performed by: INTERNAL MEDICINE

## 2024-03-26 PROCEDURE — 80048 BASIC METABOLIC PNL TOTAL CA: CPT | Performed by: INTERNAL MEDICINE

## 2024-03-26 PROCEDURE — 85610 PROTHROMBIN TIME: CPT | Performed by: INTERNAL MEDICINE

## 2024-03-26 PROCEDURE — 92960 CARDIOVERSION ELECTRIC EXT: CPT

## 2024-03-26 PROCEDURE — 25010000002 MIDAZOLAM PER 1MG: Performed by: INTERNAL MEDICINE

## 2024-03-26 RX ORDER — SODIUM CHLORIDE 0.9 % (FLUSH) 0.9 %
3 SYRINGE (ML) INJECTION EVERY 12 HOURS SCHEDULED
Status: DISCONTINUED | OUTPATIENT
Start: 2024-03-26 | End: 2024-03-27 | Stop reason: HOSPADM

## 2024-03-26 RX ORDER — AMIODARONE HYDROCHLORIDE 200 MG/1
400 TABLET ORAL ONCE
Status: COMPLETED | OUTPATIENT
Start: 2024-03-26 | End: 2024-03-26

## 2024-03-26 RX ORDER — SODIUM CHLORIDE 0.9 % (FLUSH) 0.9 %
10 SYRINGE (ML) INJECTION AS NEEDED
Status: DISCONTINUED | OUTPATIENT
Start: 2024-03-26 | End: 2024-03-27 | Stop reason: HOSPADM

## 2024-03-26 RX ORDER — SODIUM CHLORIDE 9 MG/ML
40 INJECTION, SOLUTION INTRAVENOUS AS NEEDED
Status: DISCONTINUED | OUTPATIENT
Start: 2024-03-26 | End: 2024-03-27 | Stop reason: HOSPADM

## 2024-03-26 RX ORDER — MIDAZOLAM HYDROCHLORIDE 2 MG/2ML
INJECTION, SOLUTION INTRAMUSCULAR; INTRAVENOUS
Status: COMPLETED | OUTPATIENT
Start: 2024-03-26 | End: 2024-03-26

## 2024-03-26 RX ORDER — MIDAZOLAM HYDROCHLORIDE 2 MG/2ML
INJECTION, SOLUTION INTRAMUSCULAR; INTRAVENOUS
Status: DISPENSED
Start: 2024-03-26 | End: 2024-03-26

## 2024-03-26 RX ORDER — MEPERIDINE HYDROCHLORIDE 25 MG/ML
INJECTION INTRAMUSCULAR; INTRAVENOUS; SUBCUTANEOUS
Status: COMPLETED | OUTPATIENT
Start: 2024-03-26 | End: 2024-03-26

## 2024-03-26 RX ORDER — MEPERIDINE HYDROCHLORIDE 25 MG/ML
INJECTION INTRAMUSCULAR; INTRAVENOUS; SUBCUTANEOUS
Status: DISPENSED
Start: 2024-03-26 | End: 2024-03-26

## 2024-03-26 RX ORDER — AMIODARONE HYDROCHLORIDE 200 MG/1
200 TABLET ORAL DAILY
Qty: 90 TABLET | Refills: 3 | Status: SHIPPED | OUTPATIENT
Start: 2024-03-26 | End: 2024-03-27 | Stop reason: SDUPTHER

## 2024-03-26 RX ADMIN — MIDAZOLAM HYDROCHLORIDE 2 MG: 1 INJECTION, SOLUTION INTRAMUSCULAR; INTRAVENOUS at 08:50

## 2024-03-26 RX ADMIN — AMIODARONE HYDROCHLORIDE 400 MG: 200 TABLET ORAL at 09:20

## 2024-03-26 RX ADMIN — MEPERIDINE HYDROCHLORIDE 25 MG: 25 INJECTION INTRAMUSCULAR; INTRAVENOUS; SUBCUTANEOUS at 08:46

## 2024-03-26 RX ADMIN — MIDAZOLAM HYDROCHLORIDE 4 MG: 1 INJECTION, SOLUTION INTRAMUSCULAR; INTRAVENOUS at 08:44

## 2024-03-26 NOTE — DISCHARGE INSTRUCTIONS
Echo Lab Phone Number: [phone]    A responsible adult should drive you home and stay with you today and tonight.  Do not drive a car or operate any hazardous machinery for 24 hours.  Do not drink any alcoholic beverages for 24 hours.  Do not make any legal decisions for 24 hours after sedation.  Do not engage in any strenuous activity.  Resume your medications, following prescribed instructions.  Contact your caregiver if you have questions or concerns about your care.  Follow up with ENRIQUE Naqvi on Tuesday May 21, 2024 at 8:45 am. Please call the office to cancel or reschedule.    In the event of an emergency, call 911 or go to your nearest emergency room.    You received the following medications during your procedure: Versed, Demerol, Amiodarone.

## 2024-03-27 DIAGNOSIS — I48.19 ATRIAL FIBRILLATION, PERSISTENT: Primary | ICD-10-CM

## 2024-03-27 RX ORDER — AMIODARONE HYDROCHLORIDE 200 MG/1
200 TABLET ORAL DAILY
Qty: 90 TABLET | Refills: 3 | Status: SHIPPED | OUTPATIENT
Start: 2024-03-27

## 2024-03-27 NOTE — TELEPHONE ENCOUNTER
Caller: Carlos Manuel Sanchez    Relationship: Self    Best call back number: [phone]     Requested Prescriptions:   Requested Prescriptions     Pending Prescriptions Disp Refills    amiodarone (PACERONE) 200 MG tablet 90 tablet 3     Sig: Take 1 tablet by mouth Daily. Take one tablet bid for the first week and then one tablet per day after that.        Pharmacy where request should be sent: [id number] - [address] AT SEC OF  & [facility] [phone] [facility] [phone] FX     Last office visit with prescribing clinician: 3/21/2024   Last telemedicine visit with prescribing clinician: Visit date not found   Next office visit with prescribing clinician: Visit date not found     Additional details provided by patient: PT DOES NOT HAVE ANY; SENT TO WRONG PHARMACY    Does the patient have less than a 3 day supply:  [x] Yes  [] No    Would you like a call back once the refill request has been completed: [] Yes [] No    If the office needs to give you a call back, can they leave a voicemail: [] Yes [] No    Mary Lopez Rep   03/27/24 11:47 EDT

## 2024-04-09 RX ORDER — METOPROLOL SUCCINATE 50 MG/1
TABLET, EXTENDED RELEASE ORAL
Qty: 90 TABLET | Refills: 3 | Status: SHIPPED | OUTPATIENT
Start: 2024-04-09

## 2024-04-11 ENCOUNTER — OFFICE VISIT (OUTPATIENT)
Dept: ORTHOPEDIC SURGERY | Facility: CLINIC | Age: 64
End: 2024-04-11
Payer: OTHER GOVERNMENT

## 2024-04-11 VITALS
SYSTOLIC BLOOD PRESSURE: 112 MMHG | HEART RATE: 98 BPM | OXYGEN SATURATION: 95 % | WEIGHT: 204 LBS | BODY MASS INDEX: 29.2 KG/M2 | HEIGHT: 70 IN | DIASTOLIC BLOOD PRESSURE: 76 MMHG

## 2024-04-11 DIAGNOSIS — G89.29 CHRONIC PAIN OF LEFT KNEE: Primary | ICD-10-CM

## 2024-04-11 DIAGNOSIS — M25.562 CHRONIC PAIN OF LEFT KNEE: Primary | ICD-10-CM

## 2024-04-11 PROCEDURE — 20610 DRAIN/INJ JOINT/BURSA W/O US: CPT

## 2024-04-11 RX ADMIN — LIDOCAINE HYDROCHLORIDE 5 ML: 10 INJECTION, SOLUTION INFILTRATION; PERINEURAL at 08:45

## 2024-04-11 RX ADMIN — TRIAMCINOLONE ACETONIDE 40 MG: 40 INJECTION, SUSPENSION INTRA-ARTICULAR; INTRAMUSCULAR at 08:45

## 2024-04-11 NOTE — PROGRESS NOTES
"Chief Complaint  Follow-up and Pain of the Left Knee    Subjective      Carlos Manuel Sanchez presents to [facility] for follow-up of left knee pain, patient is remote from left knee arthroscopy with partial medial meniscectomy and medial femoral chondroplasty with Dr. Avitia on 1/3/2024.  Patient had a gel injection at his last appointment on 3/7/2024 and did not notice much of a difference.  Patient is attending physical therapy with [facility] and doing well.  Reports that he continues to have difficulty with going uphill and downhill.    Objective   No Known Allergies    Vital Signs:   /76   Pulse 98   Ht 177.8 cm (70\")   Wt 92.5 kg (204 lb)   SpO2 95%   BMI 29.27 kg/m²       Physical Exam    Constitutional: Awake, alert. Well nourished appearance.    Integumentary: Warm, dry, intact. No obvious rashes.    HENT: Atraumatic, normocephalic.   Respiratory: Non labored respirations .   Cardiovascular: Intact peripheral pulses.    Psychiatric: Normal mood and affect. A&O X3    Ortho Exam  Left knee: Range of motion 0 to 120 degrees.  Mild edema generalized over the knee.  Stable to varus and valgus stress.  Stable to anterior posterior drawer test.  Tender to palpation the medial joint line.  Neurovascular intact.  Sensation is intact.      Imaging Results (Most Recent)       None             Large Joint Arthrocentesis: L knee  Date/Time: 4/11/2024 8:45 AM  Consent given by: patient  Site marked: site marked  Timeout: Immediately prior to procedure a time out was called to verify the correct patient, procedure, equipment, support staff and site/side marked as required   Supporting Documentation  Indications: pain   Procedure Details  Location: knee - L knee  Needle gauge: 21 G.  Medications administered: 5 mL lidocaine 1 %; 40 mg triamcinolone acetonide 40 MG/ML  Patient tolerance: patient tolerated the procedure well with no immediate complications            This injection " documentation was Scribed for ENRIQUE Woods by Molly Myles.  04/11/24   08:46 EDT    Assessment and Plan   Problem List Items Addressed This Visit    None  Visit Diagnoses       Chronic pain of left knee    -  Primary    Relevant Orders    Large Joint Arthrocentesis: L knee            Follow Up   Return in about 6 weeks (around 5/23/2024).    Patient is a non-smoker, did not discuss options for smoking cessation.     Social History     Socioeconomic History    Marital status:    Tobacco Use    Smoking status: Never    Smokeless tobacco: Never    Tobacco comments:     Never use   Vaping Use    Vaping status: Never Used   Substance and Sexual Activity    Alcohol use: Yes     Alcohol/week: 10.0 standard drinks of alcohol     Types: 10 Cans of beer per week     Comment: Social    Drug use: Never    Sexual activity: Defer       Patient Instructions   Left knee steroid injection administered in office today and tolerated the procedure well without complications.  Patient advised on 3 months duration between injections, advised patient that if he is diabetic to closely watch her blood glucose levels over the next 24 to 48 hours.    Continue home exercises.    Follow-up in 6 weeks to evaluate pain.  Call with questions, concerns or worsening symptoms.   Patient was given instructions and counseling regarding his condition or for health maintenance advice. Please see specific information pulled into the AVS if appropriate.

## 2024-04-15 RX ORDER — LIDOCAINE HYDROCHLORIDE 10 MG/ML
5 INJECTION, SOLUTION INFILTRATION; PERINEURAL
Status: COMPLETED | OUTPATIENT
Start: 2024-04-11 | End: 2024-04-11

## 2024-04-15 RX ORDER — TRIAMCINOLONE ACETONIDE 40 MG/ML
40 INJECTION, SUSPENSION INTRA-ARTICULAR; INTRAMUSCULAR
Status: COMPLETED | OUTPATIENT
Start: 2024-04-11 | End: 2024-04-11

## 2024-04-15 NOTE — PATIENT INSTRUCTIONS
Left knee steroid injection administered in office today and tolerated the procedure well without complications.  Patient advised on 3 months duration between injections, advised patient that if he is diabetic to closely watch her blood glucose levels over the next 24 to 48 hours.    Continue home exercises.    Follow-up in 6 weeks to evaluate pain.  Call with questions, concerns or worsening symptoms.

## 2024-05-14 ENCOUNTER — TELEPHONE (OUTPATIENT)
Dept: CARDIOLOGY | Facility: CLINIC | Age: 64
End: 2024-05-14

## 2024-05-14 NOTE — TELEPHONE ENCOUNTER
Caller: Carlos Manuel Sanchez    Relationship: Self    Best call back number: [phone] (home)     What form or medical record are you requesting: REFERRAL REQUEST    Who is requesting this form or medical record [facility] you: [facility]    How would you like to receive the form or medical records (pick-up, mail, fax): FAX  If fax, what is the fax number: DOES NOT HAVE FAX, BUT PHONE NUMBER IS [phone]    Timeframe paperwork needed: ASAP    Additional notes: PT HAS APPT ON 5.21.24 AND [facility] TOLD HIM WE HAVE TO SEND A REQUEST OVER TO THE [facility] FOR A REFERRAL SO THEY CAN SEND THAT TO US WITH THE AUTH NUMBER. PLEASE FAX THIS ASAP, THANK YOU.    IF NOT PLEASE CALL PT SO HE CAN GET HIS PCP TO DO IT, THANK YOU

## 2024-05-17 ENCOUNTER — TELEPHONE (OUTPATIENT)
Dept: FAMILY MEDICINE CLINIC | Facility: CLINIC | Age: 64
End: 2024-05-17

## 2024-05-17 NOTE — TELEPHONE ENCOUNTER
Caller: Carlos Manuel Sanchez    Relationship: Self    Best call back number: [phone]    What is the medical concern/diagnosis: FOLLOW UP CARDIOLOGY    What specialty or service is being requested: CARDIOLOGY     What is the provider, practice or medical service name: [facility]     What is the office location: [address] [place] [address] [address]     What is the office phone number: [phone]    Any additional details: THE NUMBER OF VISITS FOR REFERRAL ON FILE HAVE BEEN USED AND A NEW ONE IS NEEDED

## 2024-05-17 NOTE — TELEPHONE ENCOUNTER
Caller: Carlos Manuel Sanchez    Relationship to patient: Self    Best call back number: [phone]     Patient is needing: PATIENT CALLED TO ADVISED NO LONGER NEEDED REFERRAL AS CARDIOLOGY MADE THE REFERRAL ON BEHALF OF PATIENT.

## 2024-05-21 ENCOUNTER — OFFICE VISIT (OUTPATIENT)
Dept: CARDIOLOGY | Facility: CLINIC | Age: 64
End: 2024-05-21
Payer: OTHER GOVERNMENT

## 2024-05-21 VITALS
BODY MASS INDEX: 28.35 KG/M2 | HEART RATE: 85 BPM | DIASTOLIC BLOOD PRESSURE: 68 MMHG | SYSTOLIC BLOOD PRESSURE: 120 MMHG | HEIGHT: 70 IN | WEIGHT: 198 LBS

## 2024-05-21 DIAGNOSIS — I48.19 ATRIAL FIBRILLATION, PERSISTENT: Primary | ICD-10-CM

## 2024-05-21 PROCEDURE — 93000 ELECTROCARDIOGRAM COMPLETE: CPT | Performed by: NURSE PRACTITIONER

## 2024-05-21 PROCEDURE — 99213 OFFICE O/P EST LOW 20 MIN: CPT | Performed by: NURSE PRACTITIONER

## 2024-05-21 NOTE — PROGRESS NOTES
Chief Complaint  Follow-up    Subjective            History of Present Illness  Carlos Manuel Sanchez is a 63-year-old male patient who presents to the office today for follow-up after having cardioversion performed on 3/26/2024.  The cardioversion was considered successful since he did return to normal sinus rhythm.  However, he reports going back into atrial fibrillation about 3 to 4 days after the cardioversion.  He still experiences fatigue but denies any chest pain, shortness of breath, lightheadedness/dizziness, palpitations, or edema.  He is compliant with medication.    PMH  Past Medical History:   Diagnosis Date    Arthritis     Atrial fibrillation     Borderline diabetic     Coronary artery disease     BLOCKAGE 1 STENT PLACED 2000, SEE'S DR WARD, DENIED CP/SOA    HTN (hypertension)     Hyperlipemia     Rotator cuff syndrome 09/2022    When pulling back compound bow during hunting season    Rotator cuff tear, right          ALLERGY  No Known Allergies       SURGICALHX  Past Surgical History:   Procedure Laterality Date    APPENDECTOMY      COLONOSCOPY N/A 10/18/2021    Procedure: COLONOSCOPY with hot snare polypectomies, clips applied x2;  Surgeon: Haroldo Perez MD;  Location: [facility] ENDOSCOPY;  Service: Gastroenterology;  Laterality: N/A;  cecum polyp, transverse polyp    CORONARY STENT PLACEMENT  2000    1 STENT PLACED    EXCISION LESION Right 02/25/2022    Procedure: excision of subcutaneous mass from right arm;  Surgeon: Jeff Tomas MD;  Location: [facility] MAIN OR;  Service: General;  Laterality: Right;    KNEE ARTHROSCOPY W/ MENISCECTOMY Left 1/3/2024    Procedure: LEFT KNEE ARTHROSCOPY WITH PARTIAL MEDIAL MENISCECTOMY CHONDROPLASTY;  Surgeon: Lisa James MD;  Location: [facility] OR [facility];  Service: Orthopedics;  Laterality: Left;    ROTATOR CUFF REPAIR Left     SHOULDER ARTHROSCOPY W/ ROTATOR CUFF REPAIR Right 01/25/2023    Procedure: SHOULDER ARTHROSCOPY WITH ROTATOR CUFF REPAIR, SUBCROMIAL  DECOMPRESSION,DISTAL CLAVICLE RESECTION;  Surgeon: Lisa James MD;  [facility];  Service: Orthopedics;  Laterality: Right;    SHOULDER SURGERY  01/25/2023    Right shoulder          SOC  Social History     Socioeconomic History    Marital status:    Tobacco Use    Smoking status: Never    Smokeless tobacco: Never    Tobacco comments:     Never use   Vaping Use    Vaping status: Never Used   Substance and Sexual Activity    Alcohol use: Yes     Alcohol/week: 10.0 standard drinks of alcohol     Types: 10 Cans of beer per week     Comment: Social    Drug use: Never    Sexual activity: Defer         FAMHX  Family History   Problem Relation Age of Onset    Heart disease Mother     Heart disease Father     Cancer Father         Lung cancer    Heart disease Sister     Heart disease Brother     Lung cancer Other     Malig Hyperthermia Neg Hx           MEDSIGONLY  Current Outpatient Medications on File Prior to Visit   Medication Sig    amiodarone (PACERONE) 200 MG tablet Take 1 tablet by mouth Daily. Take one tablet bid for the first week and then one tablet per day after that.    apixaban (ELIQUIS) 5 MG tablet tablet Take 1 tablet by mouth 2 (Two) Times a Day.    aspirin 81 MG EC tablet Take  by mouth Daily. LAST DOSE 12/26/23 PER DR ED ABRKER Complex-Biotin-FA (B Complete) tablet Take 1 tablet by mouth Daily.    Evolocumab (Repatha SureClick) solution auto-injector SureClick injection Inject 1 mL under the skin into the appropriate area as directed Every 14 (Fourteen) Days.    lisinopril (PRINIVIL,ZESTRIL) 20 MG tablet TAKE 1 TABLET DAILY    loratadine (Claritin) 10 MG tablet Take 1 tablet by mouth 2 (Two) Times a Day As Needed (urticaria).    metoprolol succinate XL (TOPROL-XL) 50 MG 24 hr tablet TAKE 1 TABLET DAILY    rosuvastatin (CRESTOR) 20 MG tablet Take 1 tablet by mouth Daily.    sildenafil (VIAGRA) 100 MG tablet Take 1 tablet by mouth Daily As Needed.     No current facility-administered  "medications on file prior to visit.         Objective   /68   Pulse 85   Ht 177.8 cm (70\")   Wt 89.8 kg (198 lb)   BMI 28.41 kg/m²       Physical Exam  HENT:      Head: Normocephalic.   Neck:      Vascular: No carotid bruit.   Cardiovascular:      Rate and Rhythm: Normal rate. Rhythm irregular.      Pulses: Normal pulses.      Heart sounds: Normal heart sounds. No murmur heard.  Pulmonary:      Effort: Pulmonary effort is normal.      Breath sounds: Normal breath sounds.   Musculoskeletal:      Cervical back: Neck supple.      Right lower leg: No edema.      Left lower leg: No edema.   Skin:     General: Skin is dry.   Neurological:      Mental Status: He is alert and oriented to person, place, and time.   Psychiatric:         Behavior: Behavior normal.       ECG 12 Lead    Date/Time: 5/21/2024 7:32 AM  Performed by: Sarah White APRN    Authorized by: Sarah White APRN  Comparison: compared with previous ECG from 1/3/2024  Similar to previous ECG  Rhythm: atrial fibrillation  Rate: normal  BPM: 75  Conduction: left anterior fascicular block  ST Segments: ST segments normal  QRS axis: normal  Other findings: T wave abnormality and poor R wave progression    Clinical impression: abnormal EKG        Result Review :   The following data was reviewed by: ENRIQUE Lees on 05/21/2024:  proBNP   Date Value Ref Range Status   04/28/2023 381.5 0.0 - 900.0 pg/mL Final         3/26/2024    07:30   CMP   Glucose 142    BUN 11    Creatinine 0.88    EGFR 97.2    Sodium 135    Potassium 4.9    Chloride 101    Calcium 9.3    Total Protein    Albumin    Globulin    Total Bilirubin    Alkaline Phosphatase    AST (SGOT)    ALT (SGPT)    Albumin/Globulin Ratio    BUN/Creatinine Ratio 12.5    Anion Gap 7.9          1/10/2024    08:54   CBC w/Diff   WBC 5.60    RBC 5.18    Hemoglobin 15.6    Hematocrit 45.7    MCV 88.2    MCH 30.1    MCHC 34.1    RDW 12.0    Platelets 231    Neutrophil Rel % 65.1  " "  Immature Granulocyte Rel % 0.7    Lymphocyte Rel % 17.9    Monocyte Rel % 13.8    Eosinophil Rel % 1.8    Basophil Rel % 0.7       No results found for: \"TSH\"   No results found for: \"FREET4\"   No results found for: \"DDIMERQUANT\"  Magnesium   Date Value Ref Range Status   04/28/2023 2.0 1.6 - 2.4 mg/dL Final      No results found for: \"DIGOXIN\"   Lab Results   Component Value Date    TROPONINT 13 04/28/2023               1/10/2024    08:54   Lipid Panel   Total Cholesterol 173    Triglycerides 51    HDL Cholesterol 80    VLDL Cholesterol 10    LDL Cholesterol  83    LDL/HDL Ratio 1.04        Results for orders placed in visit on 05/03/23    Adult Transthoracic Echo Complete W/ Cont if Necessary Per Protocol    Interpretation Summary    The study is technically difficult for diagnosis.    Left ventricular systolic function is normal. Calculated left ventricular EF = 56%    Left ventricular diastolic function was normal.    The left atrial cavity is moderately dilated.    The right atrial cavity is mildly  dilated.    There is mild calcification of the aortic valve.    Estimated right ventricular systolic pressure from tricuspid regurgitation is normal (<35 mmHg).    The left atrial cavity is moderately dilated.      [id number] Score: 3        Assessment and Plan    Diagnoses and all orders for this visit:    1. Atrial fibrillation, persistent (Primary)  EKG shows he is now back in atrial fibrillation after having cardioversion procedure. We discussed all treatment options including Multaq, EP referral for possible ablation therapy, and/or adopting rate control therapy. He is wanting to consult with EP at this time. Continue amiodarone 200 mg daily and metoprolol 50 mg daily.  Continue Eliquis for CVA prevention.  -     Ambulatory Referral to Cardiac Electrophysiology    Other orders  -     ECG 12 Lead          Follow Up   Return in about 6 months (around 11/21/2024) for Follow up with Dr Clements.    Patient was " given instructions and counseling regarding his condition or for health maintenance advice. Please see specific information pulled into the AVS if appropriate.     Carlos Manuel Sanchez  reports that he has never smoked. He has never used smokeless tobacco.            Sarah White, APRN  05/21/24  08:41 EDT    Dictated Utilizing Dragon Dictation

## 2024-05-23 ENCOUNTER — OFFICE VISIT (OUTPATIENT)
Dept: ORTHOPEDIC SURGERY | Facility: CLINIC | Age: 64
End: 2024-05-23
Payer: OTHER GOVERNMENT

## 2024-05-23 VITALS
BODY MASS INDEX: 28.35 KG/M2 | SYSTOLIC BLOOD PRESSURE: 112 MMHG | OXYGEN SATURATION: 96 % | HEART RATE: 85 BPM | WEIGHT: 198 LBS | DIASTOLIC BLOOD PRESSURE: 78 MMHG | HEIGHT: 70 IN

## 2024-05-23 DIAGNOSIS — M25.562 CHRONIC PAIN OF LEFT KNEE: Primary | ICD-10-CM

## 2024-05-23 DIAGNOSIS — G89.29 CHRONIC PAIN OF LEFT KNEE: Primary | ICD-10-CM

## 2024-05-23 NOTE — PATIENT INSTRUCTIONS
Discussed getting an x-ray with patient and he declines at this time, unsure if insurance will want to pay for it.    Reviewed previous MRI with patient which revealed full-thickness cartilage loss in the medial compartment.  Personally reviewed images and patient has moderately advanced joint space narrowing in the medial compartment.  Assessment findings show no concern for soft tissue damage.    Discussed that a potential knee replacement may be in his future as he has failed conservative measures.  We discussed anti-inflammatories, however patient is unable due to taking Eliquis.  We also discussed returning to physical therapy but he showed no improvement in the past.    Patient elects to follow-up as needed.  Call for questions, concerns or worsening symptoms.

## 2024-05-23 NOTE — PROGRESS NOTES
"Chief Complaint  Follow-up and Pain of the Left Knee    Subjective      Carlos Manuel Sanchez presents to [facility] for follow-up of left knee pain.  Patient is remote from left knee arthroscopy with partial medial meniscectomy, medial femoral chondroplasty with Dr. James on 1/3/2024.  Patient has continued to have pain since surgery.  He received a gel injection which did not give him any improvement.  He also received a steroid injection on 4/11/2024 did not notice a difference.  He has been to physical therapy in the past after surgery and did not notice improvement.  Reports that the knee is hurting worse now than it ever has.  Reports that his approved visits with [facility] [place] are about to run out and that he has had difficulties with insurance wanting to pay for procedures.  He is here today for follow-up.    Objective   No Known Allergies    Vital Signs:   /78   Pulse 85   Ht 177.8 cm (70\")   Wt 89.8 kg (198 lb)   SpO2 96%   BMI 28.41 kg/m²       Physical Exam    Constitutional: Awake, alert. Well nourished appearance.    Integumentary: Warm, dry, intact. No obvious rashes.    HENT: Atraumatic, normocephalic.   Respiratory: Non labored respirations .   Cardiovascular: Intact peripheral pulses.    Psychiatric: Normal mood and affect. A&O X3    Ortho Exam  Left knee: Range of motion 0 to 115 degrees.  Stable to varus and valgus stress.  Stable to anterior posterior drawer test.  Neurovascular intact.  Sensations intact.  Negative Lachman's.  Negative John's.  No edema noted.  Tender to palpation the medial and lateral joint line.    Imaging Results (Most Recent)       None                      Assessment and Plan   Problem List Items Addressed This Visit    None  Visit Diagnoses       Chronic pain of left knee    -  Primary            Follow Up   Return if symptoms worsen or fail to improve.    Patient is a non-smoker, did not discuss options for smoking cessation.     Social " History     Socioeconomic History    Marital status:    Tobacco Use    Smoking status: Never    Smokeless tobacco: Never    Tobacco comments:     Never use   Vaping Use    Vaping status: Never Used   Substance and Sexual Activity    Alcohol use: Yes     Alcohol/week: 10.0 standard drinks of alcohol     Types: 10 Cans of beer per week     Comment: Social    Drug use: Never    Sexual activity: Defer       Patient Instructions   Discussed getting an x-ray with patient and he declines at this time, unsure if insurance will want to pay for it.    Reviewed previous MRI with patient which revealed full-thickness cartilage loss in the medial compartment.  Personally reviewed images and patient has moderately advanced joint space narrowing in the medial compartment.  Assessment findings show no concern for soft tissue damage.    Discussed that a potential knee replacement may be in his future as he has failed conservative measures.  We discussed anti-inflammatories, however patient is unable due to taking Eliquis.  We also discussed returning to physical therapy but he showed no improvement in the past.    Patient elects to follow-up as needed.  Call for questions, concerns or worsening symptoms.  Patient was given instructions and counseling regarding his condition or for health maintenance advice. Please see specific information pulled into the AVS if appropriate.

## 2024-07-11 ENCOUNTER — CLINICAL SUPPORT (OUTPATIENT)
Dept: FAMILY MEDICINE CLINIC | Facility: CLINIC | Age: 64
End: 2024-07-11
Payer: OTHER GOVERNMENT

## 2024-07-11 DIAGNOSIS — Z98.61 CAD S/P PERCUTANEOUS CORONARY ANGIOPLASTY: ICD-10-CM

## 2024-07-11 DIAGNOSIS — I25.10 CAD S/P PERCUTANEOUS CORONARY ANGIOPLASTY: ICD-10-CM

## 2024-07-11 DIAGNOSIS — Z12.5 SCREENING FOR PROSTATE CANCER: ICD-10-CM

## 2024-07-11 DIAGNOSIS — E78.00 PURE HYPERCHOLESTEROLEMIA: ICD-10-CM

## 2024-07-11 DIAGNOSIS — E11.65 TYPE 2 DIABETES MELLITUS WITH HYPERGLYCEMIA, WITHOUT LONG-TERM CURRENT USE OF INSULIN: ICD-10-CM

## 2024-07-11 LAB
ALBUMIN SERPL-MCNC: 4.1 G/DL (ref 3.5–5.2)
ALBUMIN/GLOB SERPL: 1.4 G/DL
ALP SERPL-CCNC: 86 U/L (ref 39–117)
ALT SERPL W P-5'-P-CCNC: 33 U/L (ref 1–41)
ANION GAP SERPL CALCULATED.3IONS-SCNC: 11.4 MMOL/L (ref 5–15)
AST SERPL-CCNC: 24 U/L (ref 1–40)
BASOPHILS # BLD AUTO: 0.03 10*3/MM3 (ref 0–0.2)
BASOPHILS NFR BLD AUTO: 0.6 % (ref 0–1.5)
BILIRUB SERPL-MCNC: 0.5 MG/DL (ref 0–1.2)
BUN SERPL-MCNC: 11 MG/DL (ref 8–23)
BUN/CREAT SERPL: 12.1 (ref 7–25)
CALCIUM SPEC-SCNC: 9.4 MG/DL (ref 8.6–10.5)
CHLORIDE SERPL-SCNC: 100 MMOL/L (ref 98–107)
CHOLEST SERPL-MCNC: 170 MG/DL (ref 0–200)
CO2 SERPL-SCNC: 24.6 MMOL/L (ref 22–29)
CREAT SERPL-MCNC: 0.91 MG/DL (ref 0.76–1.27)
DEPRECATED RDW RBC AUTO: 38.4 FL (ref 37–54)
EGFRCR SERPLBLD CKD-EPI 2021: 95.3 ML/MIN/1.73
EOSINOPHIL # BLD AUTO: 0.06 10*3/MM3 (ref 0–0.4)
EOSINOPHIL NFR BLD AUTO: 1.1 % (ref 0.3–6.2)
ERYTHROCYTE [DISTWIDTH] IN BLOOD BY AUTOMATED COUNT: 11.9 % (ref 12.3–15.4)
GLOBULIN UR ELPH-MCNC: 2.9 GM/DL
GLUCOSE SERPL-MCNC: 112 MG/DL (ref 65–99)
HBA1C MFR BLD: 6.3 % (ref 4.8–5.6)
HCT VFR BLD AUTO: 48.3 % (ref 37.5–51)
HDLC SERPL-MCNC: 97 MG/DL (ref 40–60)
HGB BLD-MCNC: 16.1 G/DL (ref 13–17.7)
IMM GRANULOCYTES # BLD AUTO: 0.03 10*3/MM3 (ref 0–0.05)
IMM GRANULOCYTES NFR BLD AUTO: 0.6 % (ref 0–0.5)
LDLC SERPL CALC-MCNC: 62 MG/DL (ref 0–100)
LDLC/HDLC SERPL: 0.64 {RATIO}
LYMPHOCYTES # BLD AUTO: 0.88 10*3/MM3 (ref 0.7–3.1)
LYMPHOCYTES NFR BLD AUTO: 16.3 % (ref 19.6–45.3)
MCH RBC QN AUTO: 30.1 PG (ref 26.6–33)
MCHC RBC AUTO-ENTMCNC: 33.3 G/DL (ref 31.5–35.7)
MCV RBC AUTO: 90.4 FL (ref 79–97)
MONOCYTES # BLD AUTO: 0.68 10*3/MM3 (ref 0.1–0.9)
MONOCYTES NFR BLD AUTO: 12.6 % (ref 5–12)
NEUTROPHILS NFR BLD AUTO: 3.73 10*3/MM3 (ref 1.7–7)
NEUTROPHILS NFR BLD AUTO: 68.8 % (ref 42.7–76)
NRBC BLD AUTO-RTO: 0 /100 WBC (ref 0–0.2)
PLATELET # BLD AUTO: 207 10*3/MM3 (ref 140–450)
PMV BLD AUTO: 9.8 FL (ref 6–12)
POTASSIUM SERPL-SCNC: 4.8 MMOL/L (ref 3.5–5.2)
PROT SERPL-MCNC: 7 G/DL (ref 6–8.5)
PSA SERPL-MCNC: 0.92 NG/ML (ref 0–4)
RBC # BLD AUTO: 5.34 10*6/MM3 (ref 4.14–5.8)
SODIUM SERPL-SCNC: 136 MMOL/L (ref 136–145)
TRIGL SERPL-MCNC: 54 MG/DL (ref 0–150)
VLDLC SERPL-MCNC: 11 MG/DL (ref 5–40)
WBC NRBC COR # BLD AUTO: 5.41 10*3/MM3 (ref 3.4–10.8)

## 2024-07-11 PROCEDURE — 80053 COMPREHEN METABOLIC PANEL: CPT | Performed by: FAMILY MEDICINE

## 2024-07-11 PROCEDURE — 80061 LIPID PANEL: CPT | Performed by: FAMILY MEDICINE

## 2024-07-11 PROCEDURE — 83036 HEMOGLOBIN GLYCOSYLATED A1C: CPT | Performed by: FAMILY MEDICINE

## 2024-07-11 PROCEDURE — 85025 COMPLETE CBC W/AUTO DIFF WBC: CPT | Performed by: FAMILY MEDICINE

## 2024-07-11 PROCEDURE — G0103 PSA SCREENING: HCPCS | Performed by: FAMILY MEDICINE

## 2024-07-11 PROCEDURE — 36415 COLL VENOUS BLD VENIPUNCTURE: CPT | Performed by: FAMILY MEDICINE

## 2024-07-17 ENCOUNTER — OFFICE VISIT (OUTPATIENT)
Dept: FAMILY MEDICINE CLINIC | Facility: CLINIC | Age: 64
End: 2024-07-17
Payer: OTHER GOVERNMENT

## 2024-07-17 VITALS
TEMPERATURE: 97.9 F | SYSTOLIC BLOOD PRESSURE: 118 MMHG | DIASTOLIC BLOOD PRESSURE: 74 MMHG | HEART RATE: 60 BPM | WEIGHT: 198.7 LBS | OXYGEN SATURATION: 97 % | BODY MASS INDEX: 28.45 KG/M2 | HEIGHT: 70 IN

## 2024-07-17 DIAGNOSIS — Z12.11 COLON CANCER SCREENING: ICD-10-CM

## 2024-07-17 DIAGNOSIS — I10 ESSENTIAL HYPERTENSION: Primary | ICD-10-CM

## 2024-07-17 DIAGNOSIS — E11.65 TYPE 2 DIABETES MELLITUS WITH HYPERGLYCEMIA, WITHOUT LONG-TERM CURRENT USE OF INSULIN: ICD-10-CM

## 2024-07-17 DIAGNOSIS — G89.29 CHRONIC PAIN OF LEFT KNEE: ICD-10-CM

## 2024-07-17 DIAGNOSIS — I48.19 ATRIAL FIBRILLATION, PERSISTENT: ICD-10-CM

## 2024-07-17 DIAGNOSIS — S83.232A COMPLEX TEAR OF MEDIAL MENISCUS OF LEFT KNEE AS CURRENT INJURY, INITIAL ENCOUNTER: ICD-10-CM

## 2024-07-17 DIAGNOSIS — K63.5 POLYP OF COLON, UNSPECIFIED PART OF COLON, UNSPECIFIED TYPE: ICD-10-CM

## 2024-07-17 DIAGNOSIS — E78.00 PURE HYPERCHOLESTEROLEMIA: ICD-10-CM

## 2024-07-17 DIAGNOSIS — M25.562 CHRONIC PAIN OF LEFT KNEE: ICD-10-CM

## 2024-07-17 PROCEDURE — 99214 OFFICE O/P EST MOD 30 MIN: CPT | Performed by: FAMILY MEDICINE

## 2024-07-17 NOTE — PROGRESS NOTES
Chief Complaint  Atrial fibrillation  Hypertension  Diabetes      Subjective          Carlos Manuel Sanchez presents to [facility]  History of Present Illness  Patient presents today to follow-up for atrial fibrillation as well as hypertension.  He continues to take amiodarone 200 mg daily as well as lisinopril 20 mg daily and metoprolol 50 mg daily.  He did see cardiology and is now recommended to see an electrophysiologist to discuss an ablation.  He previously had a cardioversion that lasted about 3 to 4 days but now he is starting to have issues with atrial fibrillation again.  Although, he does not feel that he is in atrial fibrillation all the time.  During his Holter monitor study though which was a 10-day study was in atrial fibrillation 100% of the time.  He reports overall feeling well at this time.  His left knee continues to bother him.  He has had injections as well as surgery for his left knee back in January 2024 which consisted of left knee arthroscopy with partial medial meniscectomy and medial femoral chondroplasty.  He continues to have issues and is contemplating having a knee replacement done.  He is considering this around January after hunting season and would like to have his heart issues taken care of as well.  He is due for colonoscopy as he needs a 3-year follow-up in October 2024.  He had a couple colon polyps.  Reviewing the pathology report these were tubular adenomas.  He had labs done prior to the appointment so we reviewed these.  PSA level was within normal limits.  Lipid panel showed the LDL at 62 with HDL at 97.  He continues to take Crestor 20 mg daily as well as Repatha 140 mg every 2 weeks.  A1c is at 6.3%.  Diabetes has been managed with diet alone.  We discussed that his levels are at goal.  CMP showed normal renal function with normal LFTs.  CBC showed no anemia with normal white blood cell count and platelets.    Current Outpatient Medications  "  Medication Instructions    amiodarone (PACERONE) 200 mg, Oral, Daily, Take one tablet bid for the first week and then one tablet per day after that.    apixaban (ELIQUIS) 5 mg, Oral, 2 Times Daily    aspirin 81 MG EC tablet Oral, Daily, LAST DOSE 12/26/23 PER DR ED BARKER Complex-Biotin-FA (B Complete) tablet 1 tablet, Oral, Daily    lisinopril (PRINIVIL,ZESTRIL) 20 MG tablet TAKE 1 TABLET DAILY    loratadine (CLARITIN) 10 mg, Oral, 2 Times Daily PRN    metoprolol succinate XL (TOPROL-XL) 50 MG 24 hr tablet TAKE 1 TABLET DAILY    Repatha SureClick 140 mg, Subcutaneous, Every 14 Days    rosuvastatin (CRESTOR) 20 mg, Oral, Daily    sildenafil (VIAGRA) 100 mg, Oral, Daily PRN       The following portions of the patient's history were reviewed and updated as appropriate: allergies, current medications, past family history, past medical history, past social history, past surgical history, and problem list.    Objective   Vital Signs:   /74 (BP Location: Left arm, Patient Position: Sitting)   Pulse 60   Temp 97.9 °F (36.6 °C) (Oral)   Ht 177.8 cm (70\")   Wt 90.1 kg (198 lb 11.2 oz)   SpO2 97%   BMI 28.51 kg/m²     BP Readings from Last 3 Encounters:   07/17/24 118/74   05/23/24 112/78   05/21/24 120/68     Wt Readings from Last 3 Encounters:   07/17/24 90.1 kg (198 lb 11.2 oz)   05/23/24 89.8 kg (198 lb)   05/21/24 89.8 kg (198 lb)           Physical Exam  Vitals reviewed.   Constitutional:       Appearance: Normal appearance.   HENT:      Head: Normocephalic and atraumatic.      Right Ear: External ear normal.      Left Ear: External ear normal.   Eyes:      Conjunctiva/sclera: Conjunctivae normal.   Cardiovascular:      Rate and Rhythm: Normal rate and regular rhythm.      Heart sounds: No murmur heard.     No friction rub. No gallop.   Pulmonary:      Effort: Pulmonary effort is normal.      Breath sounds: Normal breath sounds. No wheezing or rhonchi.   Abdominal:      General: Bowel sounds are normal. " There is no distension.      Palpations: Abdomen is soft.      Tenderness: There is no abdominal tenderness.   Skin:     General: Skin is warm and dry.   Neurological:      Mental Status: He is alert and oriented to person, place, and time.      Cranial Nerves: No cranial nerve deficit.   Psychiatric:         Mood and Affect: Mood and affect normal.         Behavior: Behavior normal.         Thought Content: Thought content normal.         Judgment: Judgment normal.              Result Review :   The following data was reviewed by: Kurt Arango DO on 07/17/2024:  Common labs          1/10/2024    08:54 1/10/2024    09:17 3/26/2024    07:30 7/11/2024    08:06   Common Labs   Glucose 114   142  112    BUN 13   11  11    Creatinine 0.95   0.88  0.91    Sodium 137   135  136    Potassium 4.5   4.9  4.8    Chloride 101   101  100    Calcium 9.5   9.3  9.4    Albumin 4.0    4.1    Total Bilirubin 0.5    0.5    Alkaline Phosphatase 90    86    AST (SGOT) 26    24    ALT (SGPT) 28    33    WBC 5.60    5.41    Hemoglobin 15.6    16.1    Hematocrit 45.7    48.3    Platelets 231    207    Total Cholesterol 173    170    Triglycerides 51    54    HDL Cholesterol 80    97    LDL Cholesterol  83    62    Hemoglobin A1C 6.70    6.30    Microalbumin, Urine  <1.2      PSA    0.920             Lab Results   Component Value Date    COVID19 NOT DETECTED 06/08/2020    INR 1.06 03/26/2024            Assessment and Plan    Diagnoses and all orders for this visit:    1. Essential hypertension (Primary)  -     CBC & Differential; Future  -     Comprehensive Metabolic Panel; Future    2. Chronic pain of left knee  -     Ambulatory Referral to Orthopedic Surgery    3. Complex tear of medial meniscus of left knee as current injury, initial encounter  -     Ambulatory Referral to Orthopedic Surgery    4. Polyp of colon, unspecified part of colon, unspecified type  -     Ambulatory Referral For Screening Colonoscopy    5. Colon cancer  screening  Overview:  Added automatically from request for surgery [id number]    Orders:  -     Ambulatory Referral For Screening Colonoscopy    6. Atrial fibrillation, persistent  Overview:  Cardoversion successful 3/24 started on a trail of amiodarone        7. Pure hypercholesterolemia  -     Lipid Panel; Future    8. Type 2 diabetes mellitus with hyperglycemia, without long-term current use of insulin  -     CBC & Differential; Future  -     Comprehensive Metabolic Panel; Future  -     Hemoglobin A1c; Future      Patient overall doing well at this time.  His cardiac exam today is reassuring.  He has regular rate and rhythm.  I suspect he is having some issues with paroxysmal atrial fibrillation.  He will discuss with electrophysiology further when he has an appointment in August.  Referral for colonoscopy has been made.  I have encouraged him to keep follow-ups with orthopedics to consider knee replacement surgery in the future given ongoing symptoms despite conservative measures.  Plan to have labs repeated again in 6 months.    There are no discontinued medications.       Follow Up   Return in about 6 months (around 1/17/2025) for Hypertension.  Patient was given instructions and counseling regarding his condition or for health maintenance advice. Please see specific information pulled into the AVS if appropriate.       Kurt Arango DO  07/17/24  08:27 EDT

## 2024-07-24 ENCOUNTER — TELEPHONE (OUTPATIENT)
Dept: GASTROENTEROLOGY | Facility: CLINIC | Age: 64
End: 2024-07-24
Payer: OTHER GOVERNMENT

## 2024-08-05 ENCOUNTER — TELEPHONE (OUTPATIENT)
Dept: CARDIOLOGY | Facility: CLINIC | Age: 64
End: 2024-08-05
Payer: OTHER GOVERNMENT

## 2024-08-05 NOTE — TELEPHONE ENCOUNTER
CaseId:[id number];Status:Cancelled;Explanation:Other. Coverage check for 09/26/2024 results in 78 = COST EXCEEDS MAXIMUM; processing pharmacy must contact BRYAN via phone to complete this review [facility] can not start or complete this review.

## 2024-08-05 NOTE — TELEPHONE ENCOUNTER
EUGENIE SR [phone] AND DID VERBAL PA RENEWAL ON THE PHONE.     PA # [id number]  PA APPROVED 07/06/24-12/31/2099     APPROVAL LETTER COMING VIA FAX

## 2024-10-08 ENCOUNTER — TRANSCRIBE ORDERS (OUTPATIENT)
Dept: CARDIOLOGY | Facility: CLINIC | Age: 64
End: 2024-10-08
Payer: OTHER GOVERNMENT

## 2024-10-08 ENCOUNTER — OFFICE VISIT (OUTPATIENT)
Age: 64
End: 2024-10-08
Payer: OTHER GOVERNMENT

## 2024-10-08 VITALS
WEIGHT: 205 LBS | HEIGHT: 70 IN | BODY MASS INDEX: 29.35 KG/M2 | DIASTOLIC BLOOD PRESSURE: 80 MMHG | HEART RATE: 57 BPM | SYSTOLIC BLOOD PRESSURE: 140 MMHG

## 2024-10-08 DIAGNOSIS — Z13.6 SCREENING FOR ISCHEMIC HEART DISEASE: ICD-10-CM

## 2024-10-08 DIAGNOSIS — I48.19 ATRIAL FIBRILLATION, PERSISTENT: Primary | ICD-10-CM

## 2024-10-08 DIAGNOSIS — Z01.810 PRE-OPERATIVE CARDIOVASCULAR EXAMINATION: Primary | ICD-10-CM

## 2024-10-08 NOTE — PROGRESS NOTES
Date of Office Visit: 10/08/2024  Encounter Provider: Haroldo Shelby MD  Place of Service: [facility]  Patient Name: Carlos Manuel Sanchez  :1960    Chief Complaint   Patient presents with    persistent AFIB   :     HPI: Carlos Manuel Sanchez is a 63 y.o. male who presents today for persistent atrial fibrillation.     He was diagnosed with atrial fibrillation in May of 2023 during ER visit for unrelated reasons.     He is intermittently aware of the atrial fibrillation, he feels anxious, nervous while in atrial fibrillation.     He was in atrial fibrillation persistently, then cardioversion in March, but recurred in atrial fibrillation after a few days    He is on amiodarone, and converted to sinus rhythm about a month ago.     He has early onset CAD, probable unstable angina/MI in late , treated with PCI while in .     May have familial hyperlipidemia.               Past Medical History:   Diagnosis Date    Arthritis     Atrial fibrillation     Borderline diabetic     Coronary artery disease     BLOCKAGE 1 STENT PLACED , SEE'S DR WARD, DENIED CP/SOA    HTN (hypertension)     Hyperlipemia     Rotator cuff syndrome 2022    When pulling back compound bow during  season    Rotator cuff tear, right        Past Surgical History:   Procedure Laterality Date    APPENDECTOMY      COLONOSCOPY N/A 10/18/2021    Procedure: COLONOSCOPY with hot snare polypectomies, clips applied x2;  Surgeon: Haroldo Perez MD;  Location: [facility] END[facility];  Service: Gastroenterology;  Laterality: N/A;  cecum polyp, transverse polyp    CORONARY STENT PLACEMENT      1 STENT PLACED    EXCISION LESION Right 2022    Procedure: excision of subcutaneous mass from right arm;  Surgeon: Jeff Tomas MD;  Location: [facility] MAIN OR;  Service: General;  Laterality: Right;    KNEE ARTHROSCOPY W/ MENISCECTOMY Left 1/3/2024    Procedure: LEFT KNEE ARTHROSCOPY WITH PARTIAL  MEDIAL MENISCECTOMY CHONDROPLASTY;  Surgeon: Lisa James MD;  Location: [facility];  Service: Orthopedics;  Laterality: Left;    ROTATOR CUFF REPAIR Left     SHOULDER ARTHROSCOPY W/ ROTATOR CUFF REPAIR Right 01/25/2023    Procedure: SHOULDER ARTHROSCOPY WITH ROTATOR CUFF REPAIR, SUBCROMIAL DECOMPRESSION,DISTAL CLAVICLE RESECTION;  Surgeon: Lisa James MD;  Location: [facility];  Service: Orthopedics;  Laterality: Right;    SHOULDER SURGERY  01/25/2023    Right shoulder       Social History     Socioeconomic History    Marital status:    Tobacco Use    Smoking status: Never    Smokeless tobacco: Never    Tobacco comments:     Never use   Vaping Use    Vaping status: Never Used   Substance and Sexual Activity    Alcohol use: Yes     Alcohol/week: 10.0 standard drinks of alcohol     Types: 10 Cans of beer per week     Comment: Social    Drug use: Never    Sexual activity: Defer       Family History   Problem Relation Age of Onset    Heart disease Mother     Heart disease Father     Cancer Father         Lung cancer    Heart disease Sister     Heart disease Brother     Lung cancer Other     Malig Hyperthermia Neg Hx        Review of Systems   Constitutional: Negative.   Cardiovascular: Negative.    Respiratory: Negative.     Gastrointestinal: Negative.        No Known Allergies      Current Outpatient Medications:     amiodarone (PACERONE) 200 MG tablet, Take 1 tablet by mouth Daily. Take one tablet bid for the first week and then one tablet per day after that., Disp: 90 tablet, Rfl: 3    apixaban (ELIQUIS) 5 MG tablet tablet, Take 1 tablet by mouth 2 (Two) Times a Day., Disp: 180 tablet, Rfl: 3    aspirin 81 MG EC tablet, Take  by mouth Daily. LAST DOSE 12/26/23 PER DR WARD, Disp: , Rfl:     B Complex-Biotin-FA (B Complete) tablet, Take 1 tablet by mouth Daily., Disp: , Rfl:     Evolocumab (Repatha SureClick) solution auto-injector SureClick injection, Inject 1 mL under the skin into the  "appropriate area as directed Every 14 (Fourteen) Days., Disp: 6 mL, Rfl: 3    lisinopril (PRINIVIL,ZESTRIL) 20 MG tablet, TAKE 1 TABLET DAILY, Disp: 90 tablet, Rfl: 3    loratadine (Claritin) 10 MG tablet, Take 1 tablet by mouth 2 (Two) Times a Day As Needed (urticaria)., Disp: 180 tablet, Rfl: 3    metoprolol succinate XL (TOPROL-XL) 50 MG 24 hr tablet, TAKE 1 TABLET DAILY, Disp: 90 tablet, Rfl: 3    rosuvastatin (CRESTOR) 20 MG tablet, Take 1 tablet by mouth Daily., Disp: 90 tablet, Rfl: 3    sildenafil (VIAGRA) 100 MG tablet, Take 1 tablet by mouth Daily As Needed., Disp: , Rfl:       Objective:     Vitals:    10/08/24 0845   BP: 140/80   Pulse: 57   Weight: 93 kg (205 lb)   Height: 177.8 cm (70\")     Body mass index is 29.41 kg/m².    PHYSICAL EXAM:    Vitals and nursing note reviewed.   Constitutional:       Appearance: Well-developed.   Eyes:      General:         Right eye: No discharge.         Left eye: No discharge.      Conjunctiva/sclera: Conjunctivae normal.   HENT:      Head: Normocephalic and atraumatic.   Neck:      Vascular: No JVD.   Pulmonary:      Effort: No respiratory distress.   Cardiovascular:      Normal rate.   Edema:     Peripheral edema absent.   Abdominal:      General: There is no distension.      Tenderness: There is no abdominal tenderness.   Musculoskeletal: Normal range of motion.      Cervical back: Neck supple. Neurological:      Cranial Nerves: No cranial nerve deficit.             ECG 12 Lead    Date/Time: 10/8/2024 9:27 AM  Performed by: Haroldo Shelby MD    Authorized by: Haroldo Shelby MD  Comparison: compared with previous ECG from 5/21/2024  Comparison to previous ECG: Atrial fibrillation has resolved since prior ekg  Rhythm: sinus rhythm      Echocardiography is normal.  LA size appears normal.       Assessment:       Diagnosis Plan   1. Atrial fibrillation, persistent               Plan:       He has symptomatic persistent atrial fibrillation.  He is " actually pretty shocking that he converted out on his own, just on amiodarone.    Given his young age symptoms, and otherwise good health I think we should pursue rhythm control aggressively.    I discussed A-fib ablation with him, we discussed the risk versus benefits.  We specifically discussed the risk of stroke, and cardiac injury.    With persistent atrial fibrillation we discussed about a 50% chance of control and atrial fibrillation with ablation alone, but also discussed the possibility that he would need further ablation, and antiarrhythmic therapy.    I do not think long-term amiodarone is a good rhythm control strategy for him due to the risk of side effects.    We began discussion about long-term anticoagulation plan and will continue after ablation.    As always, it has been a pleasure to participate in your patient's care.      Sincerely,         Haroldo Shelby MD

## 2024-10-15 ENCOUNTER — PREP FOR SURGERY (OUTPATIENT)
Dept: OTHER | Facility: HOSPITAL | Age: 64
End: 2024-10-15
Payer: OTHER GOVERNMENT

## 2024-10-15 ENCOUNTER — OFFICE VISIT (OUTPATIENT)
Dept: ORTHOPEDIC SURGERY | Facility: CLINIC | Age: 64
End: 2024-10-15
Payer: OTHER GOVERNMENT

## 2024-10-15 VITALS
HEART RATE: 54 BPM | BODY MASS INDEX: 29.35 KG/M2 | HEIGHT: 70 IN | WEIGHT: 205 LBS | DIASTOLIC BLOOD PRESSURE: 93 MMHG | SYSTOLIC BLOOD PRESSURE: 162 MMHG | OXYGEN SATURATION: 95 %

## 2024-10-15 DIAGNOSIS — M17.12 OSTEOARTHRITIS OF LEFT KNEE, UNSPECIFIED OSTEOARTHRITIS TYPE: Primary | ICD-10-CM

## 2024-10-15 DIAGNOSIS — M17.12 OSTEOARTHRITIS OF LEFT KNEE, UNSPECIFIED OSTEOARTHRITIS TYPE: ICD-10-CM

## 2024-10-15 DIAGNOSIS — M25.562 ACUTE PAIN OF LEFT KNEE: Primary | ICD-10-CM

## 2024-10-15 PROBLEM — M17.9 OA (OSTEOARTHRITIS) OF KNEE: Status: ACTIVE | Noted: 2024-10-15

## 2024-10-15 RX ORDER — TRANEXAMIC ACID 10 MG/ML
1000 INJECTION, SOLUTION INTRAVENOUS ONCE
OUTPATIENT
Start: 2024-10-15 | End: 2024-10-15

## 2024-10-15 NOTE — PROGRESS NOTES
"Chief Complaint  Follow-up of the Left Knee     Subjective      Carlos Manuel Sanchez presents to [facility] for follow up of the left knee.  Patient is remote from left knee arthroscopy with partial medial meniscectomy, medial femoral chondroplasty  on 1/3/24.  He had a steroid injection on 4/11/24. He noted the injection gave no relief.   He has pain and swelling of the left knee.  He has pain with up hill but going down hill is really horrible.  He has pain with stairs, stooping and squatting.  He has tried anti inflammatory, exercises and injections in the past.  He notes the pain is affecting his daily tasks and ADL's. He is on Eliquis he will need cardiac clearance.     No Known Allergies     Social History     Socioeconomic History    Marital status:    Tobacco Use    Smoking status: Never    Smokeless tobacco: Never    Tobacco comments:     Never use   Vaping Use    Vaping status: Never Used   Substance and Sexual Activity    Alcohol use: Yes     Alcohol/week: 10.0 standard drinks of alcohol     Types: 10 Cans of beer per week     Comment: Social    Drug use: Never    Sexual activity: Defer        I reviewed the patient's chief complaint, history of present illness, review of systems, past medical history, surgical history, family history, social history, medications, and allergy list.     Review of Systems     Constitutional: Denies fevers, chills, weight loss  Cardiovascular: Denies chest pain, shortness of breath  Skin: Denies rashes, acute skin changes  Neurologic: Denies headache, loss of consciousness        Vital Signs:   /93   Pulse 54   Ht 177.8 cm (70\")   Wt 93 kg (205 lb)   SpO2 95%   BMI 29.41 kg/m²          Physical Exam  General: Alert. No acute distress    Ortho Exam        Left knee: Flexion 120. Extension 0. Stable to varus/valgus stress. Stable to anterior/posterior drawer. Neurovascularly intact. Negative Bossman. Negative Lachman. Positive EHL, " FHL, HS and TA. Sensation intact to light touch all 5 nerves of the foot. Ambulates with Antalgic gait. Patella is well tracking. Calf supple, non-tender. Positive tenderness to the medial joint line. Positive tenderness to the lateral joint line. Positive Crepitus. Good strength to hamstrings, quadriceps, dorsiflexors, and plantar flexors.  Knee Extensor Mechanism intact Mild swelling.  Kellgren Pravin is a 4.          Procedures      Imaging Results (Most Recent)       Procedure Component Value Units Date/Time    XR Knee 3 View Left [[id number]] Resulted: 10/15/24 0810     Updated: 10/15/24 0813             Result Review :     X-Ray Report:  Left knee X-Ray  Indication: Evaluation of the left knee  AP/Lateral and [place] view(s)  Findings: Advanced degenerative bone on bone arthritis.    Prior studies available for comparison: yes          Assessment and Plan     Diagnoses and all orders for this visit:    1. Acute pain of left knee (Primary)  -     XR Knee 3 View Left    2. Osteoarthritis of left knee, unspecified osteoarthritis type        Discussed the treatment plan with the patient. I reviewed the X-rays that were obtained today with the patient.     Discussed the treatment options with the patient, operative vs non-operative. The patient expressed understanding and wished to proceed with a left total knee arthroplasty.        Discussed surgery., Risks/benefits discussed with patient including, but not limited to: infection, bleeding, neurovascular damage, re-rupture, aesthetic deformity, need for further surgery, and death., Discussed with patient the implant type being used during surgery and patient understands., Surgery pamphlet given., Call or return if worsening symptoms., DME order for a 3 in 1 given today due to patient will be confined to one room/level of the home that does not offer a toilet during postop recovery. , I am prescribing  the Kerwin® Pro2.0 sustained acoustic medicine device for the  acceleration of soft tissue repair and reduction of pain. , I am ordering the use of the Nice1 cold therapy machine for 60 days post-op as part of an opioid-sparing approach to help manage pain and edema.  I feel this is medically necessary for the best care for this patient.   , and IFC can help extend pain relief and hopefully reduce need for pain medication. TENS is used to control break through pain. NMES is used to help and strengthen weak muscles and prevent atrophy.    Follow Up     2 weeks postoperatively       Patient was given instructions and counseling regarding his condition or for health maintenance advice. Please see specific information pulled into the AVS if appropriate.     Scribed for Lisa James MD by Zara Milton MA.  10/15/24   08:09 EDT    I have personally performed the services described in this document as scribed by the above individual and it is both accurate and complete. Lisa James MD 10/15/24

## 2024-11-12 RX ORDER — LISINOPRIL 20 MG/1
TABLET ORAL
Qty: 90 TABLET | Refills: 3 | Status: SHIPPED | OUTPATIENT
Start: 2024-11-12

## 2024-11-18 ENCOUNTER — PREP FOR SURGERY (OUTPATIENT)
Dept: OTHER | Facility: HOSPITAL | Age: 64
End: 2024-11-18
Payer: OTHER GOVERNMENT

## 2024-11-18 ENCOUNTER — TELEPHONE (OUTPATIENT)
Dept: GASTROENTEROLOGY | Facility: CLINIC | Age: 64
End: 2024-11-18
Payer: OTHER GOVERNMENT

## 2024-11-18 ENCOUNTER — CLINICAL SUPPORT (OUTPATIENT)
Dept: GASTROENTEROLOGY | Facility: CLINIC | Age: 64
End: 2024-11-18
Payer: OTHER GOVERNMENT

## 2024-11-18 DIAGNOSIS — Z86.0100 HISTORY OF COLON POLYPS: ICD-10-CM

## 2024-11-18 DIAGNOSIS — Z12.11 COLON CANCER SCREENING: Primary | ICD-10-CM

## 2024-11-18 RX ORDER — SODIUM PICOSULFATE, MAGNESIUM OXIDE, AND ANHYDROUS CITRIC ACID 12; 3.5; 1 G/175ML; G/175ML; MG/175ML
175 LIQUID ORAL TAKE AS DIRECTED
Qty: 350 ML | Refills: 0 | Status: SHIPPED | OUTPATIENT
Start: 2024-11-18

## 2024-11-18 NOTE — PROGRESS NOTES
Carlos Manuel Sanchez  1960  64 y.o.    Reason for call: 3 year recall- hx colon polyps, last colon 2021- [facility]  Prep prescribed: Clenpiq  Prep instructions reviewed with patient and sent to patient via [employer]  Is the patient currently on any injectable or oral medications for weight loss or diabetes? No  Clearance needed? Yes  If yes, what clearance is needed? Blood thinner and Cardiology  Clearance has been requested from Dr. Clements  The patient has been scheduled for: Colonoscopy    If scheduled for screening colonoscopy Carlos Manuel ARGUELLES Haimluis is aware they have been scheduled for a screening colonoscopy. Patient has expressed they are not having any symptoms at all.     After your procedure, you will be contacted with results. Please confirm the best phone # to reach the patient: [phone]  Family history of colon cancer? No  If yes, indicate relative: N/A  Tentative Procedure Date: 12/11/24    Date/Place of last Scope: 2021- [facility]  Able to obtain report? yes      Family History   Problem Relation Age of Onset    Heart disease Mother     Heart disease Father     Cancer Father         Lung cancer    Heart disease Sister     Heart disease Brother     Lung cancer Other     Malig Hyperthermia Neg Hx     Colon cancer Neg Hx      Past Medical History:   Diagnosis Date    Arthritis     Atrial fibrillation     Borderline diabetic     Colon polyp 10/18/2021    Coronary artery disease     BLOCKAGE 1 STENT PLACED 2000, SEE'S DR CLEMENTS, DENIED CP/SOA    Diabetes mellitus 2/15/24    HTN (hypertension)     Hyperlipemia     Rotator cuff syndrome 09/2022    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 MAIN OR;  Service: General;  Laterality: Right;    KNEE ARTHROSCOPY W/ MENISCECTOMY Left 1/3/2024    Procedure: LEFT KNEE ARTHROSCOPY WITH PARTIAL MEDIAL MENISCECTOMY CHONDROPLASTY;  Surgeon: Lisa James MD;  Location: [facility] OR [facility];  Service: Orthopedics;  Laterality: Left;    ROTATOR CUFF REPAIR Left     SHOULDER ARTHROSCOPY W/ ROTATOR CUFF REPAIR Right 01/25/2023    Procedure: SHOULDER ARTHROSCOPY WITH ROTATOR CUFF REPAIR, SUBCROMIAL DECOMPRESSION,DISTAL CLAVICLE RESECTION;  Surgeon: Lisa James MD;  Location: [facility] OR [facility];  Service: Orthopedics;  Laterality: Right;    SHOULDER SURGERY  01/25/2023    Right shoulder     Social History     Socioeconomic History    Marital status:    Tobacco Use    Smoking status: Never     Passive exposure: Past    Smokeless tobacco: Never    Tobacco comments:     Never use   Vaping Use    Vaping status: Never Used   Substance and Sexual Activity    Alcohol use: Yes     Alcohol/week: 10.0 standard drinks of alcohol     Types: 10 Cans of beer per week     Comment: Social    Drug use: Never    Sexual activity: Yes     Partners: Female     Birth control/protection: Vasectomy       Current Outpatient Medications:     amiodarone (PACERONE) 200 MG tablet, Take 1 tablet by mouth Daily. Take one tablet bid for the first week and then one tablet per day after that., Disp: 90 tablet, Rfl: 3    apixaban (ELIQUIS) 5 MG tablet tablet, Take 1 tablet by mouth 2 (Two) Times a Day., Disp: 180 tablet, Rfl: 3    aspirin 81 MG EC tablet, Take  by mouth Daily. LAST DOSE 12/26/23 PER DR WARD, Disp: , Rfl:     B Complex-Biotin-FA (B Complete) tablet, Take 1 tablet by mouth Daily., Disp: , Rfl:     Evolocumab (Repatha SureClick) solution auto-injector  SureClick injection, Inject 1 mL under the skin into the appropriate area as directed Every 14 (Fourteen) Days., Disp: 6 mL, Rfl: 3    lisinopril (PRINIVIL,ZESTRIL) 20 MG tablet, TAKE 1 TABLET DAILY, Disp: 90 tablet, Rfl: 3    metoprolol succinate XL (TOPROL-XL) 50 MG 24 hr tablet, TAKE 1 TABLET DAILY, Disp: 90 tablet, Rfl: 3    rosuvastatin (CRESTOR) 20 MG tablet, Take 1 tablet by mouth Daily., Disp: 90 tablet, Rfl: 3    sildenafil (VIAGRA) 100 MG tablet, Take 1 tablet by mouth Daily As Needed., Disp: , Rfl:     loratadine (Claritin) 10 MG tablet, Take 1 tablet by mouth 2 (Two) Times a Day As Needed (urticaria). (Patient not taking: Reported on 11/18/2024), Disp: 180 tablet, Rfl: 3

## 2024-11-18 NOTE — TELEPHONE ENCOUNTER
11/18/2024    Dear Dr. Clements,     Patient: Carlos Manuel Sanchez   YOB: 1960        This patient is waiting to have a Colonoscopy and/or Esophagogastroduodenoscopy which I will perform at [facility] on 12/11/24.     Our records indicate this patient is currently taking Eliquis. This procedure requires the patient to suspend their anticoagulant medication prior to surgery.     Please respond to this request noting your recommendations. You may contact our office at [phone] Option 1 with any questions. I appreciate your prompt response in this matter.     Please return this form to our office no later than two weeks prior to the procedure date listed above. Please return form to [phone].     ____ I approve my patient to stop taking their Anticoagulant Therapy medication 2 days prior to the scheduled procedure.    ____ I do NOT approve my patient to stop taking their Anticoagulant Therapy medication at this time.      ____ I approve my patient from a Cardiac  standpoint    ____ I do NOT approve my patient from a Cardiac  standpoint at this time      Please specify clearance expiration date:____________________________________      Approving physician name (please print): _____________________________________________      Approving physician signature: ________________________________ Date:________________    Sincerely,  [facility] - Gastroenterology   Dr. Haroldo Perez          Please fax approval or denial to our office as soon as possible.

## 2024-11-18 NOTE — TELEPHONE ENCOUNTER
Procedure: Colonoscopy and/or EGD     Med Directive: Eliquis     PMH: CAD prior stent, afib, HTN, HLD     Last Seen: 5/21/24

## 2024-11-25 ENCOUNTER — CLINICAL SUPPORT (OUTPATIENT)
Dept: FAMILY MEDICINE CLINIC | Facility: CLINIC | Age: 64
End: 2024-11-25
Payer: OTHER GOVERNMENT

## 2024-11-25 DIAGNOSIS — E11.65 TYPE 2 DIABETES MELLITUS WITH HYPERGLYCEMIA, WITHOUT LONG-TERM CURRENT USE OF INSULIN: ICD-10-CM

## 2024-11-25 DIAGNOSIS — Z23 FLU VACCINE NEED: Primary | ICD-10-CM

## 2024-11-25 DIAGNOSIS — I10 ESSENTIAL HYPERTENSION: ICD-10-CM

## 2024-11-25 DIAGNOSIS — E78.00 PURE HYPERCHOLESTEROLEMIA: ICD-10-CM

## 2024-11-25 PROCEDURE — 90471 IMMUNIZATION ADMIN: CPT | Performed by: FAMILY MEDICINE

## 2024-11-25 PROCEDURE — 36415 COLL VENOUS BLD VENIPUNCTURE: CPT | Performed by: FAMILY MEDICINE

## 2024-11-25 PROCEDURE — 90656 IIV3 VACC NO PRSV 0.5 ML IM: CPT | Performed by: FAMILY MEDICINE

## 2024-12-05 ENCOUNTER — OFFICE VISIT (OUTPATIENT)
Dept: CARDIOLOGY | Facility: CLINIC | Age: 64
End: 2024-12-05
Payer: OTHER GOVERNMENT

## 2024-12-05 VITALS
HEART RATE: 54 BPM | SYSTOLIC BLOOD PRESSURE: 124 MMHG | WEIGHT: 208 LBS | DIASTOLIC BLOOD PRESSURE: 82 MMHG | HEIGHT: 70 IN | BODY MASS INDEX: 29.78 KG/M2

## 2024-12-05 DIAGNOSIS — I48.19 ATRIAL FIBRILLATION, PERSISTENT: ICD-10-CM

## 2024-12-05 DIAGNOSIS — E78.5 HYPERLIPIDEMIA LDL GOAL <70: ICD-10-CM

## 2024-12-05 DIAGNOSIS — Z98.61 CAD S/P PERCUTANEOUS CORONARY ANGIOPLASTY: Primary | ICD-10-CM

## 2024-12-05 DIAGNOSIS — I25.10 CAD S/P PERCUTANEOUS CORONARY ANGIOPLASTY: Primary | ICD-10-CM

## 2024-12-05 DIAGNOSIS — I10 PRIMARY HYPERTENSION: ICD-10-CM

## 2024-12-05 PROCEDURE — 99214 OFFICE O/P EST MOD 30 MIN: CPT | Performed by: INTERNAL MEDICINE

## 2024-12-05 NOTE — PROGRESS NOTES
Chief Complaint  Follow-up, Coronary Artery Disease, Hyperlipidemia, and Atrial Fibrillation    Subjective    Patient reports some episodes of symptomatic A-fib brief in nature though just lasting for few hours feels that he is now in normal sinus rhythm otherwise tolerating his medicines well he is scheduled for possible ablation in January  Past Medical History:   Diagnosis Date    Arthritis     Atrial fibrillation     Borderline diabetic     Colon polyp 10/18/2021    Coronary artery disease     BLOCKAGE 1 STENT PLACED 2000, SEE'S DR WARD, DENIED CP/SOA    Diabetes mellitus 2/15/24    HTN (hypertension)     Hyperlipemia     Rotator cuff syndrome 09/2022    When pulling back compound bow during [employer] season    Rotator cuff tear, right          Current Outpatient Medications:     amiodarone (PACERONE) 200 MG tablet, Take 1 tablet by mouth Daily. Take one tablet bid for the first week and then one tablet per day after that., Disp: 90 tablet, Rfl: 3    apixaban (ELIQUIS) 5 MG tablet tablet, Take 1 tablet by mouth 2 (Two) Times a Day., Disp: 180 tablet, Rfl: 3    aspirin 81 MG EC tablet, Take  by mouth Daily. LAST DOSE 12/26/23 PER DR WARD, Disp: , Rfl:     B Complex-Biotin-FA (B Complete) tablet, Take 1 tablet by mouth Daily., Disp: , Rfl:     Evolocumab (Repatha SureClick) solution auto-injector SureClick injection, Inject 1 mL under the skin into the appropriate area as directed Every 14 (Fourteen) Days., Disp: 6 mL, Rfl: 3    lisinopril (PRINIVIL,ZESTRIL) 20 MG tablet, TAKE 1 TABLET DAILY, Disp: 90 tablet, Rfl: 3    metoprolol succinate XL (TOPROL-XL) 50 MG 24 hr tablet, TAKE 1 TABLET DAILY, Disp: 90 tablet, Rfl: 3    rosuvastatin (CRESTOR) 20 MG tablet, Take 1 tablet by mouth Daily., Disp: 90 tablet, Rfl: 3    sildenafil (VIAGRA) 100 MG tablet, Take 1 tablet by mouth Daily As Needed., Disp: , Rfl:     loratadine (Claritin) 10 MG tablet, Take 1 tablet by mouth 2 (Two) Times a Day As Needed (urticaria). (Patient  "not taking: Reported on 12/5/2024), Disp: 180 tablet, Rfl: 3    Sod Picosulfate-Mag Ox-Cit Acd (Clenpiq) 10-3.5-12 MG-GM -GM/175ML solution, Take 175 mL by mouth Take As Directed. (Patient not taking: Reported on 12/5/2024), Disp: 350 mL, Rfl: 0    There are no discontinued medications.  No Known Allergies     Social History     Tobacco Use    Smoking status: Never     Passive exposure: Past    Smokeless tobacco: Never    Tobacco comments:     Never use   Vaping Use    Vaping status: Never Used   Substance Use Topics    Alcohol use: Yes     Alcohol/week: 10.0 standard drinks of alcohol     Types: 10 Cans of beer per week     Comment: Social    Drug use: Never       Family History   Problem Relation Age of Onset    Heart disease Mother     Heart disease Father     Cancer Father         Lung cancer    Heart disease Sister     Heart disease Brother     Lung cancer Other     Malig Hyperthermia Neg Hx     Colon cancer Neg Hx         Objective     /82   Pulse 54   Ht 177.8 cm (70\")   Wt 94.3 kg (208 lb)   BMI 29.84 kg/m²       Physical Exam    General Appearance:   no acute distress  Alert and oriented x3  HENT:   lips not cyanotic  Atraumatic  Neck:  No jvd   supple  Respiratory:  no respiratory distress  normal breath sounds  no rales  Cardiovascular:  Regular rate and rhythmno S3, no S4   no murmur  no rub  Extremities  No cyanosis  lower extremity edema: none    Skin:   warm, dry  No rashes      Result Review :     proBNP   Date Value Ref Range Status   04/28/2023 381.5 0.0 - 900.0 pg/mL Final     CMP          1/10/2024    08:54 3/26/2024    07:30 7/11/2024    08:06   CMP   Glucose 114  142  112    BUN 13  11  11    Creatinine 0.95  0.88  0.91    EGFR 90.5  97.2  95.3    Sodium 137  135  136    Potassium 4.5  4.9  4.8    Chloride 101  101  100    Calcium 9.5  9.3  9.4    Total Protein 7.0   7.0    Albumin 4.0   4.1    Globulin 3.0   2.9    Total Bilirubin 0.5   0.5    Alkaline Phosphatase 90   86    AST " "(SGOT) 26   24    ALT (SGPT) 28   33    Albumin/Globulin Ratio 1.3   1.4    BUN/Creatinine Ratio 13.7  12.5  12.1    Anion Gap 10.2  7.9  11.4      CBC w/diff          1/10/2024    08:54 7/11/2024    08:06   CBC w/Diff   WBC 5.60  5.41    RBC 5.18  5.34    Hemoglobin 15.6  16.1    Hematocrit 45.7  48.3    MCV 88.2  90.4    MCH 30.1  30.1    MCHC 34.1  33.3    RDW 12.0  11.9    Platelets 231  207    Neutrophil Rel % 65.1  68.8    Immature Granulocyte Rel % 0.7  0.6    Lymphocyte Rel % 17.9  16.3    Monocyte Rel % 13.8  12.6    Eosinophil Rel % 1.8  1.1    Basophil Rel % 0.7  0.6       No results found for: \"TSH\"   No results found for: \"FREET4\"   No results found for: \"DDIMERQUANT\"  Magnesium   Date Value Ref Range Status   04/28/2023 2.0 1.6 - 2.4 mg/dL Final      No results found for: \"DIGOXIN\"   Lab Results   Component Value Date    TROPONINT 13 04/28/2023           Lipid Panel          1/10/2024    08:54 7/11/2024    08:06   Lipid Panel   Total Cholesterol 173  170    Triglycerides 51  54    HDL Cholesterol 80  97    VLDL Cholesterol 10  11    LDL Cholesterol  83  62    LDL/HDL Ratio 1.04  0.64      No results found for: \"POCTROP\"    Results for orders placed in visit on 05/03/23    Adult Transthoracic Echo Complete W/ Cont if Necessary Per Protocol    Interpretation Summary    The study is technically difficult for diagnosis.    Left ventricular systolic function is normal. Calculated left ventricular EF = 56%    Left ventricular diastolic function was normal.    The left atrial cavity is moderately dilated.    The right atrial cavity is mildly  dilated.    There is mild calcification of the aortic valve.    Estimated right ventricular systolic pressure from tricuspid regurgitation is normal (<35 mmHg).    The left atrial cavity is moderately dilated.                 Diagnoses and all orders for this visit:    1. CAD S/P percutaneous coronary angioplasty (Primary)  Assessment & Plan:  Patient is doing well no " ongoing angina continue with chronic aspirin 81 mg daily        2. Atrial fibrillation, persistent  Assessment & Plan:  Patient with symptomatic improvement after cardioversion and started on amiodarone therapy symptom wise primarily in normal sinus rhythm but given his young age and likely prolonged use of potentially amiodarone patient has been referred for evaluation for ablation he is currently set up in January continue with his Eliquis 5 twice daily until then as well as his current amiodarone dose      3. Hyperlipidemia LDL goal <70  Assessment & Plan:    patient's most recent LDL at goal continue with Crestor 20 nightly and Repatha subcu 140 every twice daily      4. Primary hypertension            Follow Up     Return in about 6 months (around 6/5/2025) for Follow with Sarah White.          Patient was given instructions and counseling regarding his condition or for health maintenance advice. Please see specific information pulled into the AVS if appropriate.

## 2024-12-05 NOTE — ASSESSMENT & PLAN NOTE
patient's most recent LDL at goal continue with Crestor 20 nightly and Repatha subcu 140 every twice daily

## 2024-12-05 NOTE — PRE-PROCEDURE INSTRUCTIONS
Patient confirmed receipt of message and verbalized understanding of instructions.  Cardiac and blood thinner clearances noted in chart.

## 2024-12-05 NOTE — PRE-PROCEDURE INSTRUCTIONS
"PAT call attempted. No answer.Detailed message with date and arrival time of 1200 given. Come to entrance \"C\", must have adult  for transportation home;may have two visitors;however, children under 12 must remain in waiting area.Instructed on diet/clear liquids/NPO bowel prep,if needed may take normal meds two hours prior to arrival time except for blood thinners, antidiabetics,diurectics,and weight loss meds.Instructed to return call to confirm receipt of instructions and for any questions.  "

## 2024-12-05 NOTE — ASSESSMENT & PLAN NOTE
Patient with symptomatic improvement after cardioversion and started on amiodarone therapy symptom wise primarily in normal sinus rhythm but given his young age and likely prolonged use of potentially amiodarone patient has been referred for evaluation for ablation he is currently set up in January continue with his Eliquis 5 twice daily until then as well as his current amiodarone dose

## 2024-12-06 ENCOUNTER — OFFICE VISIT (OUTPATIENT)
Dept: FAMILY MEDICINE CLINIC | Facility: CLINIC | Age: 64
End: 2024-12-06
Payer: OTHER GOVERNMENT

## 2024-12-06 VITALS
HEART RATE: 57 BPM | SYSTOLIC BLOOD PRESSURE: 128 MMHG | TEMPERATURE: 97.9 F | HEIGHT: 70 IN | BODY MASS INDEX: 29.58 KG/M2 | WEIGHT: 206.6 LBS | OXYGEN SATURATION: 96 % | DIASTOLIC BLOOD PRESSURE: 70 MMHG
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
## 2024-12-06 DIAGNOSIS — S20.462A TICK BITE OF LEFT BACK WALL OF THORAX, INITIAL ENCOUNTER: Primary | ICD-10-CM
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
## 2024-12-06 PROCEDURE — 99213 OFFICE O/P EST LOW 20 MIN: CPT | Performed by: FAMILY MEDICINE

## 2024-12-06 PROCEDURE — 36415 COLL VENOUS BLD VENIPUNCTURE: CPT | Performed by: FAMILY MEDICINE

## 2024-12-06 PROCEDURE — 86757 RICKETTSIA ANTIBODY: CPT | Performed by: FAMILY MEDICINE

## 2024-12-06 PROCEDURE — 86666 EHRLICHIA ANTIBODY: CPT | Performed by: FAMILY MEDICINE

## 2024-12-06 PROCEDURE — 86618 LYME DISEASE ANTIBODY: CPT | Performed by: FAMILY MEDICINE

## 2024-12-06 RX ORDER — DOXYCYCLINE 100 MG/1
100 CAPSULE ORAL 2 TIMES DAILY
Qty: 14 CAPSULE | Refills: 0 | Status: SHIPPED | OUTPATIENT
Start: 2024-12-06 | End: 2024-12-13

## 2024-12-06 NOTE — PROGRESS NOTES
"Chief Complaint  Skin Problem    Subjective      Carlos Manuel Sanchez is a 64 y.o. male who presents to [facility]     History of Present Illness  The patient is a 64-year-old male who presents today for a complaint of a painful skin tag on his back on the left side.    He reports that the skin tag becomes irritated when he leans back against a chair, causing discomfort. He has observed a change in the color of the skin tag, noting it has become darker. Additionally, he mentions the presence of another skin tag under his arm. He has not sought dermatological consultation for his freckles. He has a history of benign skin lesions, some of which have undergone color changes.    He has been on Eliquis for approximately 6 months for atrial fibrillation. He has never had a blood clot.    He has experienced tick bites in the past and believes the current one may have been present for a week. He recalls a previous episode where Dr. Arango prescribed a course of antibiotics for a large, round lesion on his leg after a tick bite      Supplemental Information  He has a colonoscopy scheduled for next week and will be taking medication on the 10th. He has blood work scheduled for the 18th for a tab ablation in January.    ALLERGIES  The patient has no known allergies.    MEDICATIONS  Eliquis        Patient Care Team:  Kurt Arango DO as PCP - General (Family Medicine)  Jeff Tomas MD as Consulting Physician (General Surgery)    Objective   Vital Signs:   Vitals:    12/06/24 0824   BP: 128/70   Pulse: 57   Temp: 97.9 °F (36.6 °C)   SpO2: 96%   Weight: 93.7 kg (206 lb 9.6 oz)   Height: 177.8 cm (70\")     Body mass index is 29.64 kg/m².    Wt Readings from Last 3 Encounters:   12/06/24 93.7 kg (206 lb 9.6 oz)   12/05/24 94.3 kg (208 lb)   10/15/24 93 kg (205 lb)     BP Readings from Last 3 Encounters:   12/06/24 128/70   12/05/24 124/82   10/15/24 162/93       Health Maintenance   Topic " Date Due    DIABETIC FOOT EXAM  Never done    DIABETIC EYE EXAM  Never done    TDAP/TD VACCINES (1 - Tdap) Never done    ZOSTER VACCINE (1 of 2) Never done    ANNUAL PHYSICAL  Never done    COLORECTAL CANCER SCREENING  10/18/2024    HEMOGLOBIN A1C  01/11/2025    Pneumococcal Vaccine 0-64 (1 of 2 - PCV) 01/17/2025 (Originally 10/22/1966)    BMI FOLLOWUP  02/15/2025    LIPID PANEL  07/11/2025    HEPATITIS C SCREENING  Completed    COVID-19 Vaccine  Completed    INFLUENZA VACCINE  Completed    URINE MICROALBUMIN  Discontinued       Lab Results (last 24 hours)       Procedure Component Value Units Date/Time    CBC & Differential [[id number]] Collected: 12/06/24 0909    Specimen: Blood from Arm, Left Updated: 12/06/24 0909    Narrative:      The following orders were created for panel order CBC & Differential.  Procedure                               Abnormality         Status                     ---------                               -----------         ------                     CBC Auto Differential[[id number]]                            In process                   Please view results for these tests on the individual orders.    Comprehensive Metabolic Panel [[id number]] Collected: 12/06/24 0909    Specimen: Blood from Arm, Left Updated: 12/06/24 0909    Lipid Panel [[id number]] Collected: 12/06/24 0909    Specimen: Blood from Arm, Left Updated: 12/06/24 0909    CBC Auto Differential [[id number]] Collected: 12/06/24 0909    Specimen: Blood from Arm, Left Updated: 12/06/24 0909    Ehrlichia Antibody Panel [[id number]] Collected: 12/06/24 0909    Specimen: Blood from Arm, Left Updated: 12/06/24 0909    Hemoglobin A1c [[id number]] Collected: 12/06/24 1036    Specimen: Blood from Arm, Left Updated: 12/06/24 1036    Lyme Disease Total Antibody With Reflex to Immunoassay [[id number]] Collected: 12/06/24 1036    Specimen: Blood from Arm, Left Updated: 12/06/24 1037    Spotted Fever Group AB, IgG/IgM [[id number]] Collected: 12/06/24  1036    Specimen: Blood from Arm, Left Updated: 12/06/24 1037               Physical Exam  Vitals and nursing note reviewed.   Constitutional:       General: He is not in acute distress.     Appearance: Normal appearance. He is not ill-appearing.   HENT:      Head: Normocephalic and atraumatic.   Eyes:      Extraocular Movements: Extraocular movements intact.      Conjunctiva/sclera: Conjunctivae normal.   Cardiovascular:      Rate and Rhythm: Normal rate.   Pulmonary:      Effort: Pulmonary effort is normal.   Skin:     General: Skin is warm and dry.      Comments: Left posterior thorax/flank region with large tick embedded and engorged.     Neurological:      General: No focal deficit present.      Mental Status: He is alert and oriented to person, place, and time.   Psychiatric:         Mood and Affect: Mood normal.         Behavior: Behavior normal.          Physical Exam  A tick was found on the left side of his back. There are multiple freckles and skin tags scattered across his skin.      Result Review   The following data was reviewed by: Flaca Tse MD on 12/06/2024:  [x]  Tests & Results  []  Hospitalization/Emergency Department/Urgent Care  []  Internal/External Consultant Notes    Results        Validation General Procedure    Date/Time: 12/6/2024 11:27 AM    Performed by: Flaca Tse MD  Authorized by: Flaca Tse MD  Comments: Area on Left axilla/posterior thorax prepped with alcohol and chlorhexidine, then tick grasped with forceps near skin and gently pulled until released.  No signs of retained tick parts.  Mild erythema surrounding site. No bleeding.                ASSESSMENT/PLAN  Diagnoses and all orders for this visit:    1. Tick bite of left back wall of thorax, initial encounter (Primary)  -     doxycycline (VIBRAMYCIN) 100 MG capsule; Take 1 capsule by mouth 2 (Two) Times a Day for 7 days.  Dispense: 14 capsule; Refill: 0  -     Ehrlichia Antibody Panel  -     Lyme  Disease Total Antibody With Reflex to Immunoassay; Future  -     Spotted Fever Group AB, IgG/IgM; Future  -     Lyme Disease Total Antibody With Reflex to Immunoassay  -     Spotted Fever Group AB, IgG/IgM    2. Tick bite with subsequent removal of tick  -     doxycycline (VIBRAMYCIN) 100 MG capsule; Take 1 capsule by mouth 2 (Two) Times a Day for 7 days.  Dispense: 14 capsule; Refill: 0  -     Validation General Procedure        Assessment & Plan  1. Tick bite.  The tick has been attached for an extended period, likely a week, as evidenced by the size of the engorgement. There is no significant rash around the area, but there was initial redness. A course of doxycycline will be administered as a prophylactic measure against Lyme disease. Blood work will be conducted to check for tick-borne illnesses, including [place] spotted fever and Lyme disease. The tick was given to patient in specimen cup and information provided if he wishes to send to the [facility] for identification/testing.    2. Skin tags.  Multiple skin tags were noted, some of which have darkened. A referral to a dermatologist will be made for further evaluation and monitoring of these spots.    3. Atrial fibrillation.  He is currently on Eliquis for atrial fibrillation and has been on it for approximately 6 months. No history of blood clots was reported.    PROCEDURE  The tick was removed from the left side of his back.                Carlos Manuel Sanchez  reports that he has never smoked. He has been exposed to tobacco smoke. He has never used smokeless tobacco.            FOLLOW UP  Return for Next scheduled follow up.  Patient was given instructions and counseling regarding his condition or for health maintenance advice. Please see specific information pulled into the AVS if appropriate.       Flaca Tse MD  12/06/24  11:37 EST    Part of this note may be an electronic transcription/translation of spoken language to printed  text using the Dragon Dictation System.    Patient or patient representative verbalized consent for the use of Ambient Listening during the visit with  Flaca Tse MD for chart documentation. 12/6/2024  11:37 EST

## 2024-12-07 LAB — B BURGDOR IGG+IGM SER QL IA: NEGATIVE

## 2024-12-09 LAB
A PHAGOCYTOPH IGG TITR SER IF: NEGATIVE {TITER}
A PHAGOCYTOPH IGM TITR SER IF: NEGATIVE {TITER}
E CHAFFEENSIS IGG TITR SER IF: NEGATIVE {TITER}
E CHAFFEENSIS IGM TITR SER IF: NEGATIVE {TITER}
RESULT COMMENT:: NORMAL

## 2024-12-10 ENCOUNTER — ANESTHESIA EVENT (OUTPATIENT)
Dept: GASTROENTEROLOGY | Facility: HOSPITAL | Age: 64
End: 2024-12-10
Payer: OTHER GOVERNMENT

## 2024-12-10 LAB
RESULT COMMENT:: NORMAL
RICK SF IGG TITR SER: NORMAL {TITER}
RICK SF IGM TITR SER: NORMAL {TITER}

## 2024-12-10 NOTE — ANESTHESIA PREPROCEDURE EVALUATION
Anesthesia Evaluation     Nursing notes reviewed   NPO Solid Status: > 8 hours  NPO Liquid Status: > 4 hours           Airway   Mallampati: III  TM distance: >3 FB  Neck ROM: full  No difficulty expected  Dental - normal exam         Pulmonary - normal exam   Cardiovascular - normal exam  Exercise tolerance: good (4-7 METS)    ECG reviewed  PT is on anticoagulation therapy  Patient on routine beta blocker and Beta blocker given within 24 hours of surgery  Rhythm: regular  Rate: normal    (+) hypertension well controlled 2 medications or greater, CAD (H/O Afib goes in and out.), cardiac stents (2000) more than 12 months ago , dysrhythmias (Patient goes in and out of AFIB) Atrial Fib, hyperlipidemia      Neuro/Psych  GI/Hepatic/Renal/Endo    (+) obesity, diabetes mellitus type 2    Musculoskeletal     Abdominal  - normal exam   Substance History - negative use     OB/GYN negative ob/gyn ROS         Other   arthritis,     ROS/Med Hx Other: Last eliquis:______    EKG 10/2024  Comparison: compared with previous ECG from 5/21/2024  Comparison to previous ECG: Atrial fibrillation has resolved since prior EKG. Rhythm: sinus rhythm    Echo 05/2023  LVEF = 56% , Trace-mild MR, Trace-mild TR                   Anesthesia Plan    ASA 3     general   total IV anesthesia  (Patient understands anesthesia not responsible for dental damage.)  intravenous induction     Anesthetic plan, risks, benefits, and alternatives have been provided, discussed and informed consent has been obtained with: patient.  Pre-procedure education provided  Use of blood products discussed with patient  Consented to blood products.    Plan discussed with CRNA.      CODE STATUS:

## 2024-12-11 ENCOUNTER — HOSPITAL ENCOUNTER (OUTPATIENT)
Facility: HOSPITAL | Age: 64
Setting detail: HOSPITAL OUTPATIENT SURGERY
Discharge: HOME | End: 2024-12-11
Attending: INTERNAL MEDICINE | Admitting: INTERNAL MEDICINE
Payer: OTHER GOVERNMENT

## 2024-12-11 ENCOUNTER — ANESTHESIA (OUTPATIENT)
Dept: GASTROENTEROLOGY | Facility: HOSPITAL | Age: 64
End: 2024-12-11
Payer: OTHER GOVERNMENT

## 2024-12-11 VITALS
DIASTOLIC BLOOD PRESSURE: 84 MMHG | BODY MASS INDEX: 28.94 KG/M2 | SYSTOLIC BLOOD PRESSURE: 123 MMHG | WEIGHT: 201.72 LBS | OXYGEN SATURATION: 95 % | HEART RATE: 53 BPM | RESPIRATION RATE: 15 BRPM | TEMPERATURE: 97.9 F

## 2024-12-11 LAB — GLUCOSE BLDC GLUCOMTR-MCNC: 109 MG/DL (ref 70–99)

## 2024-12-11 PROCEDURE — 45378 DIAGNOSTIC COLONOSCOPY: CPT | Performed by: INTERNAL MEDICINE

## 2024-12-11 PROCEDURE — 25010000002 PROPOFOL 10 MG/ML EMULSION: Performed by: MARRIAGE & FAMILY THERAPIST

## 2024-12-11 PROCEDURE — 82948 REAGENT STRIP/BLOOD GLUCOSE: CPT | Performed by: NURSE ANESTHETIST, CERTIFIED REGISTERED

## 2024-12-11 PROCEDURE — 25010000002 LIDOCAINE PF 2% 2 % SOLUTION: Performed by: MARRIAGE & FAMILY THERAPIST

## 2024-12-11 RX ORDER — LIDOCAINE HYDROCHLORIDE 20 MG/ML
INJECTION, SOLUTION EPIDURAL; INFILTRATION; INTRACAUDAL; PERINEURAL AS NEEDED
Status: DISCONTINUED | OUTPATIENT
Start: 2024-12-11 | End: 2024-12-11 | Stop reason: SURG

## 2024-12-11 RX ORDER — PROPOFOL 10 MG/ML
VIAL (ML) INTRAVENOUS AS NEEDED
Status: DISCONTINUED | OUTPATIENT
Start: 2024-12-11 | End: 2024-12-11 | Stop reason: SURG

## 2024-12-11 RX ADMIN — PROPOFOL 50 MG: 10 INJECTION, EMULSION INTRAVENOUS at 13:29

## 2024-12-11 RX ADMIN — PROPOFOL 155 MCG/KG/MIN: 10 INJECTION, EMULSION INTRAVENOUS at 13:30

## 2024-12-11 RX ADMIN — LIDOCAINE HYDROCHLORIDE 50 MG: 20 INJECTION, SOLUTION INTRAVENOUS at 13:29

## 2024-12-11 NOTE — ANESTHESIA POSTPROCEDURE EVALUATION
Patient: Carlos Manuel Sanchez    Procedure Summary       Date: 12/11/24 Room / Location: [facility] ENDOSCOPY 2 / [facility] ENDOSCOPY    Anesthesia Start: 1328 Anesthesia Stop: 1349    Procedure: COLONOSCOPY Diagnosis:       Colon cancer screening      History of colon polyps      (Colon cancer screening [Z12.11])      (History of colon polyps [Z86.0100])    Surgeons: Haroldo Perez MD Provider: Jose Watson CRNA    Anesthesia Type: general ASA Status: 3            Anesthesia Type: general    Vitals  Vitals Value Taken Time   /84 12/11/24 1403   Temp 36.6 °C (97.9 °F) 12/11/24 1403   Pulse 54 12/11/24 1406   Resp 15 12/11/24 1403   SpO2 93 % 12/11/24 1406   Vitals shown include unfiled device data.        Post Anesthesia Care and Evaluation    Post-procedure mental status: acceptable.  Pain management: satisfactory to patient    Airway patency: patent  Anesthetic complications: No anesthetic complications    Cardiovascular status: acceptable  Respiratory status: acceptable    Comments: Per chart review

## 2024-12-11 NOTE — H&P
ScreeningPre Procedure History & Physical    Chief Complaint:   Screening     Subjective     HPI:   Screening     Past Medical History:   Past Medical History:   Diagnosis Date    Arthritis     Atrial fibrillation     Borderline diabetic     Colon polyp 10/18/2021    Coronary artery disease     BLOCKAGE 1 STENT PLACED 2000, SEE'S DR WARD, DENIED CP/SOA    Diabetes mellitus 2/15/24    HTN (hypertension)     Hyperlipemia     Rotator cuff syndrome 09/2022    When pulling back compound bow during hunting season    Rotator cuff tear, right        Past Surgical History:  Past Surgical History:   Procedure Laterality Date    APPENDECTOMY      COLONOSCOPY N/A 10/18/2021    Procedure: COLONOSCOPY with hot snare polypectomies, clips applied x2;  Surgeon: Haroldo Perez MD;  Location: [facility] ENDOSCOPY;  Service: Gastroenterology;  Laterality: N/A;  cecum polyp, transverse polyp    CORONARY STENT PLACEMENT  2000    1 STENT PLACED    EXCISION LESION Right 02/25/2022    Procedure: excision of subcutaneous mass from right arm;  Surgeon: Jeff Tomas MD;  Location: [facility] MAIN OR;  Service: General;  Laterality: Right;    KNEE ARTHROSCOPY W/ MENISCECTOMY Left 1/3/2024    Procedure: LEFT KNEE ARTHROSCOPY WITH PARTIAL MEDIAL MENISCECTOMY CHONDROPLASTY;  Surgeon: Lisa James MD;  Location: [facility] OR [facility];  Service: Orthopedics;  Laterality: Left;    ROTATOR CUFF REPAIR Left     SHOULDER ARTHROSCOPY W/ ROTATOR CUFF REPAIR Right 01/25/2023    Procedure: SHOULDER ARTHROSCOPY WITH ROTATOR CUFF REPAIR, SUBCROMIAL DECOMPRESSION,DISTAL CLAVICLE RESECTION;  Surgeon: Lisa James MD;  Location: [facility] OR [facility];  Service: Orthopedics;  Laterality: Right;    SHOULDER SURGERY  01/25/2023    Right shoulder       Family History:  Family History   Problem Relation Age of Onset    Heart disease Mother     Heart disease Father     Cancer Father         Lung cancer    Heart disease Sister     Heart disease Brother     Lung cancer  Other     Malig Hyperthermia Neg Hx     Colon cancer Neg Hx        Social History:   reports that he has never smoked. He has been exposed to tobacco smoke. He has never used smokeless tobacco. He reports current alcohol use of about 10.0 standard drinks of alcohol per week. He reports that he does not use drugs.    Medications:   Medications Prior to Admission   Medication Sig Dispense Refill Last Dose/Taking    amiodarone (PACERONE) 200 MG tablet Take 1 tablet by mouth Daily. Take one tablet bid for the first week and then one tablet per day after that. 90 tablet 3 12/10/2024    aspirin 81 MG EC tablet Take  by mouth Daily. LAST DOSE 12/26/23 PER DR WARD   12/10/2024    B Complex-Biotin-FA (B Complete) tablet Take 1 tablet by mouth Daily.   Past Week    doxycycline (VIBRAMYCIN) 100 MG capsule Take 1 capsule by mouth 2 (Two) Times a Day for 7 days. 14 capsule 0 12/10/2024    Evolocumab (Repatha SureClick) solution auto-injector SureClick injection Inject 1 mL under the skin into the appropriate area as directed Every 14 (Fourteen) Days. 6 mL 3 Past Month    lisinopril (PRINIVIL,ZESTRIL) 20 MG tablet TAKE 1 TABLET DAILY 90 tablet 3 12/10/2024    metoprolol succinate XL (TOPROL-XL) 50 MG 24 hr tablet TAKE 1 TABLET DAILY 90 tablet 3 12/11/2024    rosuvastatin (CRESTOR) 20 MG tablet Take 1 tablet by mouth Daily. 90 tablet 3 12/10/2024    apixaban (ELIQUIS) 5 MG tablet tablet Take 1 tablet by mouth 2 (Two) Times a Day. 180 tablet 3 12/8/2024    loratadine (Claritin) 10 MG tablet Take 1 tablet by mouth 2 (Two) Times a Day As Needed (urticaria). (Patient not taking: Reported on 11/18/2024) 180 tablet 3     sildenafil (VIAGRA) 100 MG tablet Take 1 tablet by mouth Daily As Needed.          Allergies:  Patient has no known allergies.        Objective     Blood pressure 140/78, pulse 57, temperature 98.1 °F (36.7 °C), temperature source Temporal, resp. rate 18, weight 91.5 kg (201 lb 11.5 oz), SpO2 93%.    Physical Exam    Constitutional: Pt is oriented to person, place, and time and well-developed, well-nourished, and in no distress.   Mouth/Throat: Oropharynx is clear and moist.   Neck: Normal range of motion.   Cardiovascular: Normal rate, regular rhythm and normal heart sounds.    Pulmonary/Chest: Effort normal and breath sounds normal.   Abdominal: Soft. Nontender  Skin: Skin is warm and dry.   Psychiatric: Mood, memory, affect and judgment normal.     Assessment & Plan     Diagnosis:  Screening colonoscopy  H/o colon polyps     Anticipated Surgical Procedure:  colonoscopy    The risks, benefits, and alternatives of this procedure have been discussed with the patient or the responsible party- the patient understands and agrees to proceed.

## 2024-12-17 DIAGNOSIS — Z47.1 AFTERCARE FOLLOWING LEFT KNEE JOINT REPLACEMENT SURGERY: Primary | ICD-10-CM

## 2024-12-17 DIAGNOSIS — Z96.652 AFTERCARE FOLLOWING LEFT KNEE JOINT REPLACEMENT SURGERY: Primary | ICD-10-CM

## 2024-12-18 ENCOUNTER — TELEPHONE (OUTPATIENT)
Dept: CARDIOLOGY | Facility: CLINIC | Age: 64
End: 2024-12-18
Payer: OTHER GOVERNMENT

## 2024-12-18 ENCOUNTER — CLINICAL SUPPORT (OUTPATIENT)
Dept: FAMILY MEDICINE CLINIC | Facility: CLINIC | Age: 64
End: 2024-12-18
Payer: OTHER GOVERNMENT

## 2024-12-18 DIAGNOSIS — Z13.6 SCREENING FOR ISCHEMIC HEART DISEASE: ICD-10-CM

## 2024-12-18 DIAGNOSIS — Z01.810 PRE-OPERATIVE CARDIOVASCULAR EXAMINATION: ICD-10-CM

## 2024-12-18 DIAGNOSIS — M17.12 OSTEOARTHRITIS OF LEFT KNEE, UNSPECIFIED OSTEOARTHRITIS TYPE: ICD-10-CM

## 2024-12-18 LAB
ANION GAP SERPL CALCULATED.3IONS-SCNC: 6 MMOL/L (ref 5–15)
BASOPHILS # BLD AUTO: 0.03 10*3/MM3 (ref 0–0.2)
BASOPHILS NFR BLD AUTO: 0.5 % (ref 0–1.5)
BUN SERPL-MCNC: 10 MG/DL (ref 8–23)
BUN/CREAT SERPL: 11 (ref 7–25)
CALCIUM SPEC-SCNC: 9.5 MG/DL (ref 8.6–10.5)
CHLORIDE SERPL-SCNC: 101 MMOL/L (ref 98–107)
CO2 SERPL-SCNC: 27 MMOL/L (ref 22–29)
CREAT SERPL-MCNC: 0.91 MG/DL (ref 0.76–1.27)
DEPRECATED RDW RBC AUTO: 39.9 FL (ref 37–54)
EGFRCR SERPLBLD CKD-EPI 2021: 94.7 ML/MIN/1.73
EOSINOPHIL # BLD AUTO: 0.06 10*3/MM3 (ref 0–0.4)
EOSINOPHIL NFR BLD AUTO: 1.1 % (ref 0.3–6.2)
ERYTHROCYTE [DISTWIDTH] IN BLOOD BY AUTOMATED COUNT: 11.7 % (ref 12.3–15.4)
GLUCOSE SERPL-MCNC: 112 MG/DL (ref 65–99)
HBA1C MFR BLD: 5.8 % (ref 4.8–5.6)
HCT VFR BLD AUTO: 48.4 % (ref 37.5–51)
HGB BLD-MCNC: 15.6 G/DL (ref 13–17.7)
IMM GRANULOCYTES # BLD AUTO: 0.03 10*3/MM3 (ref 0–0.05)
IMM GRANULOCYTES NFR BLD AUTO: 0.5 % (ref 0–0.5)
LYMPHOCYTES # BLD AUTO: 0.64 10*3/MM3 (ref 0.7–3.1)
LYMPHOCYTES NFR BLD AUTO: 11.4 % (ref 19.6–45.3)
MCH RBC QN AUTO: 29.8 PG (ref 26.6–33)
MCHC RBC AUTO-ENTMCNC: 32.2 G/DL (ref 31.5–35.7)
MCV RBC AUTO: 92.5 FL (ref 79–97)
MONOCYTES # BLD AUTO: 0.66 10*3/MM3 (ref 0.1–0.9)
MONOCYTES NFR BLD AUTO: 11.8 % (ref 5–12)
NEUTROPHILS NFR BLD AUTO: 4.19 10*3/MM3 (ref 1.7–7)
NEUTROPHILS NFR BLD AUTO: 74.7 % (ref 42.7–76)
NRBC BLD AUTO-RTO: 0 /100 WBC (ref 0–0.2)
PLATELET # BLD AUTO: 221 10*3/MM3 (ref 140–450)
PMV BLD AUTO: 10.2 FL (ref 6–12)
POTASSIUM SERPL-SCNC: 4.6 MMOL/L (ref 3.5–5.2)
RBC # BLD AUTO: 5.23 10*6/MM3 (ref 4.14–5.8)
SODIUM SERPL-SCNC: 134 MMOL/L (ref 136–145)
WBC NRBC COR # BLD AUTO: 5.61 10*3/MM3 (ref 3.4–10.8)

## 2024-12-18 PROCEDURE — 85025 COMPLETE CBC W/AUTO DIFF WBC: CPT | Performed by: INTERNAL MEDICINE

## 2024-12-18 PROCEDURE — 80048 BASIC METABOLIC PNL TOTAL CA: CPT | Performed by: INTERNAL MEDICINE

## 2024-12-18 PROCEDURE — 83036 HEMOGLOBIN GLYCOSYLATED A1C: CPT | Performed by: ORTHOPAEDIC SURGERY

## 2024-12-18 PROCEDURE — 36415 COLL VENOUS BLD VENIPUNCTURE: CPT | Performed by: ORTHOPAEDIC SURGERY

## 2024-12-18 NOTE — TELEPHONE ENCOUNTER
[facility] received a fax that requires your attention. The document has been indexed to the patient’s chart for your review.      Reason for sending: EXTERNAL MEDICAL RECORD NOTIFICATION     Documents Description: CARDIAC CLEARANCE REQ-MYRON LUCIO-12.18.24    Name of Sender: MYRON LUCIO     Date Indexed: 12.18.24

## 2024-12-18 NOTE — TELEPHONE ENCOUNTER
Procedure: L Total Knee     Med Directive: Eliquis     PMH: CAD prior stent, afib, HTN, HLD     Last Seen: 12/5/2024    Patient to have ablation on 1/6 with Dr Shelby

## 2025-01-07 ENCOUNTER — TELEPHONE (OUTPATIENT)
Dept: ORTHOPEDIC SURGERY | Facility: CLINIC | Age: 65
End: 2025-01-07
Payer: OTHER GOVERNMENT

## 2025-01-09 DIAGNOSIS — Z01.818 ENCOUNTER FOR PREADMISSION TESTING: Primary | ICD-10-CM

## 2025-01-13 ENCOUNTER — PRE-ADMISSION TESTING (OUTPATIENT)
Dept: PREADMISSION TESTING | Facility: HOSPITAL | Age: 65
End: 2025-01-13
Payer: OTHER GOVERNMENT

## 2025-01-13 VITALS
WEIGHT: 198.41 LBS | HEIGHT: 70 IN | RESPIRATION RATE: 20 BRPM | BODY MASS INDEX: 28.41 KG/M2 | OXYGEN SATURATION: 96 % | TEMPERATURE: 99 F | DIASTOLIC BLOOD PRESSURE: 89 MMHG | HEART RATE: 86 BPM | SYSTOLIC BLOOD PRESSURE: 128 MMHG

## 2025-01-13 DIAGNOSIS — Z01.818 ENCOUNTER FOR PREADMISSION TESTING: ICD-10-CM

## 2025-01-13 DIAGNOSIS — M17.12 OSTEOARTHRITIS OF LEFT KNEE, UNSPECIFIED OSTEOARTHRITIS TYPE: ICD-10-CM

## 2025-01-13 LAB
ALBUMIN SERPL-MCNC: 4.2 G/DL (ref 3.5–5.2)
ALBUMIN/GLOB SERPL: 1.2 G/DL
ALP SERPL-CCNC: 95 U/L (ref 39–117)
ALT SERPL W P-5'-P-CCNC: 48 U/L (ref 1–41)
ANION GAP SERPL CALCULATED.3IONS-SCNC: 10.4 MMOL/L (ref 5–15)
AST SERPL-CCNC: 35 U/L (ref 1–40)
BILIRUB SERPL-MCNC: 0.6 MG/DL (ref 0–1.2)
BUN SERPL-MCNC: 15 MG/DL (ref 8–23)
BUN/CREAT SERPL: 16.7 (ref 7–25)
CALCIUM SPEC-SCNC: 9.3 MG/DL (ref 8.6–10.5)
CHLORIDE SERPL-SCNC: 98 MMOL/L (ref 98–107)
CO2 SERPL-SCNC: 22.6 MMOL/L (ref 22–29)
CREAT SERPL-MCNC: 0.9 MG/DL (ref 0.76–1.27)
EGFRCR SERPLBLD CKD-EPI 2021: 96 ML/MIN/1.73
GLOBULIN UR ELPH-MCNC: 3.4 GM/DL
GLUCOSE SERPL-MCNC: 135 MG/DL (ref 65–99)
POTASSIUM SERPL-SCNC: 4.4 MMOL/L (ref 3.5–5.2)
PROT SERPL-MCNC: 7.6 G/DL (ref 6–8.5)
SODIUM SERPL-SCNC: 131 MMOL/L (ref 136–145)

## 2025-01-13 PROCEDURE — 36415 COLL VENOUS BLD VENIPUNCTURE: CPT

## 2025-01-13 PROCEDURE — 80053 COMPREHEN METABOLIC PANEL: CPT

## 2025-01-13 NOTE — SIGNIFICANT NOTE
Pt goal is less pain after surgery.  Pt using [facility] in [place].  Pt's spouse to be support with aftercare.

## 2025-01-13 NOTE — DISCHARGE INSTRUCTIONS
IMPORTANT INSTRUCTIONS - PRE-ADMISSION TESTING  DO NOT EAT OR CHEW anything after midnight the night before your procedure.    You may have CLEAR liquids up to ___3___ hours prior to ARRIVAL time.   Take the following medications the morning of your procedure with JUST A SIP OF WATER:  _Rosuvastatin, metoprolol, amiodorone     Hold  LISINOPRIL  am of surgery _    DO NOT BRING your medications to the hospital with you, UNLESS something has changed since your PRE-Admission Testing appointment.  Hold all vitamins, supplements, and NSAIDS (Non- steroidal anti-inflammatory meds) for one week prior to surgery (you MAY take Tylenol or Acetaminophen).  If you are diabetic, check your blood sugar the morning of your procedure. If it is less than 70 or if you are feeling symptomatic, call the following number for further instructions: [phone].  Use your inhalers/nebulizers as usual, the morning of your procedure. BRING YOUR INHALERS with you.   Bring your CPAP or BIPAP to hospital, ONLY IF YOU WILL BE SPENDING THE NIGHT.   Make sure you have a ride home and have someone who will stay with you the day of your procedure after you go home.  If you have any questions, please call your Pre-Admission Testing Nurse, _Sheila GORDON RN_ at [phone].   Per anesthesia request, do not smoke for 24 hours before your procedure or as instructed by your surgeon.    Clear Liquid Diet        Find out when you need to start a clear liquid diet.   Think of “clear liquids” as anything you could read a newspaper through. This includes things like water, broth, sports drinks, or tea WITHOUT any kind of milk or cream.           Once you are told to start a clear liquid diet, only drink these things until 2 hours before arrival to the hospital or when the hospital says to stop. Total volume limitation: 8 oz.       Clear liquids you CAN drink:   Water   Clear broth: beef, chicken, vegetable, or bone broth with nothing in it   Gatorade   Lemonade  or Anton-aid   Soda   Tea, coffee (NO cream or honey)   Jell-O (without fruit)   Popsicles (without fruit or cream)   Italian ices   Juice without pulp: apple, white, grape   You may use salt, pepper, and sugar  No Red liquids  Drink a 20 oz bottle of Gatorade 0 the night before Surgery if you are an early arrival time.     If you are later in the day then       drink 3 hours prior to arrival     Do NOT drink:   Milk or cream   Soy milk, almond milk, coconut milk, or other non-dairy drinks and   creamers   Milkshakes or smoothies   Tomato juice   Orange juice   Grapefruit juice   Cream soups or any other than broth         Clear Liquid Diet:  Do NOT eat any solid food.  Do NOT eat or suck on mints or candy.  Do NOT chew gum.  Do NOT drink thick liquids like milk or juice with pulp in it.  Do NOT add milk, cream, or anything like soy milk or almond milk to coffee or tea.       PREOPERATIVE (BEFORE SURGERY)              BATHING INSTRUCTIONS  Instructions:    You will need to shower 1 2 3 separate times utilizing the soap provided; at the times indicated   below:        1/15/25PM   1/16/25 AM  1/16/25 pm      Wash your hair and face with normal shampoo and soap, rinse it well before using the surgical soap.      In the shower, wet the skin completely with water from your neck to your feet. Apply the cleanser to your   body ONLY FROM THE NECK TO YOUR FEET.     Do NOT USE THE CLEANSER ON YOUR FACE, HEAD, OR GENITAL (PRIVATE) AREAS.   Keep it out of your eyes, ears, and mouth because of the risk of injury to those areas.      Scrub with a clean washcloth for each bath utilizing the soap provided from the top of your body to the   bottom starting at the neck area.      Pay close attention to your armpits, groin area, and the site of surgery.      Wash your body gently for 5 minutes. Stand outside the stream or turn off the water while scrubbing your   body. Do NOT wash with your regular soap after the surgical cleanser is  used.      RINSE THE CLEANSER OFF COMPLETELY with plenty of water. Rinse the area again thoroughly.      Dry off with a clean towel. The surgical soap can cause dryness; however do NOT APPLY LOTION,   CREAM, POWDER, and/or DEODORANT AFTER SHOWERING.     Be sure to where clean clothes after showering.      Ensure CLEAN BED LINENS AFTER FIRST wash with the surgical soap.      NO PETS ALLOWED IN THE BED with you after utilizing the surgical soap.

## 2025-01-16 ENCOUNTER — ANESTHESIA EVENT (OUTPATIENT)
Dept: PERIOP | Facility: HOSPITAL | Age: 65
End: 2025-01-16
Payer: OTHER GOVERNMENT

## 2025-01-16 NOTE — H&P
Chief Complaint  No chief complaint on file.     Subjective      Carlos Manuel Sanchez presents to [facility] for follow up of the left knee.  Patient is remote from left knee arthroscopy with partial medial meniscectomy, medial femoral chondroplasty  on 1/3/24.  He had a steroid injection on 4/11/24. He noted the injection gave no relief.   He has pain and swelling of the left knee.  He has pain with up hill but going down hill is really horrible.  He has pain with stairs, stooping and squatting.  He has tried anti inflammatory, exercises and injections in the past.  He notes the pain is affecting his daily tasks and ADL's. He is on Eliquis he will need cardiac clearance.     No Known Allergies     Social History     Socioeconomic History    Marital status:    Tobacco Use    Smoking status: Never     Passive exposure: Past    Smokeless tobacco: Never    Tobacco comments:     Never use   Vaping Use    Vaping status: Never Used   Substance and Sexual Activity    Alcohol use: Yes     Alcohol/week: 10.0 standard drinks of alcohol     Types: 10 Cans of beer per week     Comment: Social    Drug use: Never    Sexual activity: Defer     Partners: Female     Birth control/protection: Vasectomy        I reviewed the patient's chief complaint, history of present illness, review of systems, past medical history, surgical history, family history, social history, medications, and allergy list.     Review of Systems     Constitutional: Denies fevers, chills, weight loss  Cardiovascular: Denies chest pain, shortness of breath  Skin: Denies rashes, acute skin changes  Neurologic: Denies headache, loss of consciousness        Vital Signs:   There were no vitals taken for this visit.         Physical Exam  General: Alert. No acute distress    Ortho Exam        Left knee: Flexion 120. Extension 0. Stable to varus/valgus stress. Stable to anterior/posterior drawer. Neurovascularly intact. Negative Bossman.  Negative Lachman. Positive EHL, FHL, HS and TA. Sensation intact to light touch all 5 nerves of the foot. Ambulates with Antalgic gait. Patella is well tracking. Calf supple, non-tender. Positive tenderness to the medial joint line. Positive tenderness to the lateral joint line. Positive Crepitus. Good strength to hamstrings, quadriceps, dorsiflexors, and plantar flexors.  Knee Extensor Mechanism intact Mild swelling.  Kellgren Pravin is a 4.          Procedures      Imaging Results (Most Recent)       None             Result Review :     X-Ray Report:  Left knee X-Ray  Indication: Evaluation of the left knee  AP/Lateral and [place] view(s)  Findings: Advanced degenerative bone on bone arthritis.    Prior studies available for comparison: yes          Assessment and Plan     There are no diagnoses linked to this encounter.      Discussed the treatment plan with the patient. I reviewed the X-rays that were obtained today with the patient.     Discussed the treatment options with the patient, operative vs non-operative. The patient expressed understanding and wished to proceed with a left total knee arthroplasty.        Discussed surgery., Risks/benefits discussed with patient including, but not limited to: infection, bleeding, neurovascular damage, re-rupture, aesthetic deformity, need for further surgery, and death., Discussed with patient the implant type being used during surgery and patient understands., Surgery pamphlet given., Call or return if worsening symptoms., DME order for a 3 in 1 given today due to patient will be confined to one room/level of the home that does not offer a toilet during postop recovery. , I am prescribing  the Kerwin® Pro2.0 sustained acoustic medicine device for the acceleration of soft tissue repair and reduction of pain. , I am ordering the use of the Nice1 cold therapy machine for 60 days post-op as part of an opioid-sparing approach to help manage pain and edema.  I feel this is  medically necessary for the best care for this patient.   , and IFC can help extend pain relief and hopefully reduce need for pain medication. TENS is used to control break through pain. NMES is used to help and strengthen weak muscles and prevent atrophy.    Follow Up     2 weeks postoperatively       Patient was given instructions and counseling regarding his condition or for health maintenance advice. Please see specific information pulled into the AVS if appropriate.     Scribed for No name on file by Zara Milton MA.  10/15/24   08:09 EDT    I have personally performed the services described in this document as scribed by the above individual and it is both accurate and complete. Lisa James MD 01/16/25

## 2025-01-17 ENCOUNTER — APPOINTMENT (OUTPATIENT)
Dept: GENERAL RADIOLOGY | Facility: HOSPITAL | Age: 65
End: 2025-01-17
Payer: OTHER GOVERNMENT

## 2025-01-17 ENCOUNTER — ANESTHESIA EVENT CONVERTED (OUTPATIENT)
Dept: ANESTHESIOLOGY | Facility: HOSPITAL | Age: 65
End: 2025-01-17
Payer: OTHER GOVERNMENT

## 2025-01-17 ENCOUNTER — ANESTHESIA (OUTPATIENT)
Dept: PERIOP | Facility: HOSPITAL | Age: 65
End: 2025-01-17
Payer: OTHER GOVERNMENT

## 2025-01-17 ENCOUNTER — HOSPITAL ENCOUNTER (OUTPATIENT)
Facility: HOSPITAL | Age: 65
Discharge: HOME | End: 2025-01-18
Attending: ORTHOPAEDIC SURGERY | Admitting: ORTHOPAEDIC SURGERY
Payer: OTHER GOVERNMENT

## 2025-01-17 DIAGNOSIS — M17.12 OSTEOARTHRITIS OF LEFT KNEE, UNSPECIFIED OSTEOARTHRITIS TYPE: ICD-10-CM

## 2025-01-17 DIAGNOSIS — Z78.9 IMPAIRED MOBILITY AND ADLS: Primary | ICD-10-CM

## 2025-01-17 DIAGNOSIS — R26.2 DIFFICULTY IN WALKING: ICD-10-CM

## 2025-01-17 DIAGNOSIS — Z74.09 IMPAIRED MOBILITY AND ADLS: Primary | ICD-10-CM

## 2025-01-17 LAB — GLUCOSE BLDC GLUCOMTR-MCNC: 127 MG/DL (ref 70–99)

## 2025-01-17 PROCEDURE — 73560 X-RAY EXAM OF KNEE 1 OR 2: CPT

## 2025-01-17 PROCEDURE — 25010000002 PROPOFOL 10 MG/ML EMULSION: Performed by: MARRIAGE & FAMILY THERAPIST

## 2025-01-17 PROCEDURE — 25010000002 ONDANSETRON PER 1 MG: Performed by: MARRIAGE & FAMILY THERAPIST

## 2025-01-17 PROCEDURE — 25010000002 CEFAZOLIN PER 500 MG: Performed by: ORTHOPAEDIC SURGERY

## 2025-01-17 PROCEDURE — 25010000002 LIDOCAINE PF 2% 2 % SOLUTION: Performed by: MARRIAGE & FAMILY THERAPIST

## 2025-01-17 PROCEDURE — 99213 OFFICE O/P EST LOW 20 MIN: CPT | Performed by: STUDENT IN AN ORGANIZED HEALTH CARE EDUCATION/TRAINING PROGRAM

## 2025-01-17 PROCEDURE — 25010000002 HYDROMORPHONE 1 MG/ML SOLUTION: Performed by: MARRIAGE & FAMILY THERAPIST

## 2025-01-17 PROCEDURE — C1713 ANCHOR/SCREW BN/BN,TIS/BN: HCPCS | Performed by: ORTHOPAEDIC SURGERY

## 2025-01-17 PROCEDURE — 25010000002 MIDAZOLAM PER 1MG: Performed by: ANESTHESIOLOGY

## 2025-01-17 PROCEDURE — 25010000002 ONDANSETRON PER 1 MG: Performed by: ORTHOPAEDIC SURGERY

## 2025-01-17 PROCEDURE — 25010000002 BUPIVACAINE (PF) 0.5 % SOLUTION: Performed by: ANESTHESIOLOGY

## 2025-01-17 PROCEDURE — 25010000002 GLYCOPYRROLATE 0.2 MG/ML SOLUTION: Performed by: MARRIAGE & FAMILY THERAPIST

## 2025-01-17 PROCEDURE — 25010000002 KETOROLAC TROMETHAMINE PER 15 MG: Performed by: MARRIAGE & FAMILY THERAPIST

## 2025-01-17 PROCEDURE — 27447 TOTAL KNEE ARTHROPLASTY: CPT | Performed by: ORTHOPAEDIC SURGERY

## 2025-01-17 PROCEDURE — 25010000002 SUGAMMADEX 200 MG/2ML SOLUTION: Performed by: MARRIAGE & FAMILY THERAPIST

## 2025-01-17 PROCEDURE — 25010000002 FENTANYL CITRATE (PF) 50 MCG/ML SOLUTION: Performed by: MARRIAGE & FAMILY THERAPIST

## 2025-01-17 PROCEDURE — 82948 REAGENT STRIP/BLOOD GLUCOSE: CPT

## 2025-01-17 PROCEDURE — 25010000002 KETOROLAC TROMETHAMINE PER 15 MG: Performed by: ORTHOPAEDIC SURGERY

## 2025-01-17 PROCEDURE — 25010000002 DEXAMETHASONE PER 1 MG: Performed by: MARRIAGE & FAMILY THERAPIST

## 2025-01-17 PROCEDURE — 25810000003 LACTATED RINGERS PER 1000 ML: Performed by: ANESTHESIOLOGY

## 2025-01-17 PROCEDURE — 25810000003 LACTATED RINGERS PER 1000 ML: Performed by: ORTHOPAEDIC SURGERY

## 2025-01-17 PROCEDURE — C1776 JOINT DEVICE (IMPLANTABLE): HCPCS | Performed by: ORTHOPAEDIC SURGERY

## 2025-01-17 DEVICE — PAT ARCOM W/WIRE 3PEG 34MM: Type: IMPLANTABLE DEVICE | Site: KNEE | Status: FUNCTIONAL

## 2025-01-17 DEVICE — TRY TIB CRUC 75MM: Type: IMPLANTABLE DEVICE | Site: KNEE | Status: FUNCTIONAL

## 2025-01-17 DEVICE — CAP TOTL KN CMT PRIMARY: Type: IMPLANTABLE DEVICE | Site: KNEE | Status: FUNCTIONAL

## 2025-01-17 DEVICE — COMP FEM/KN VANGUARD INTLK CR 72.5MM NS LT: Type: IMPLANTABLE DEVICE | Site: KNEE | Status: FUNCTIONAL

## 2025-01-17 DEVICE — CMT BONE PALACOS R HI/VISC 1X40: Type: IMPLANTABLE DEVICE | Site: KNEE | Status: FUNCTIONAL

## 2025-01-17 DEVICE — BEAR TIB/KN VANGUARD AS 16X75MM NS: Type: IMPLANTABLE DEVICE | Site: KNEE | Status: FUNCTIONAL

## 2025-01-17 RX ORDER — ONDANSETRON 4 MG/1
4 TABLET, ORALLY DISINTEGRATING ORAL EVERY 6 HOURS PRN
Status: DISCONTINUED | OUTPATIENT
Start: 2025-01-17 | End: 2025-01-18 | Stop reason: HOSPADM

## 2025-01-17 RX ORDER — ACETAMINOPHEN 500 MG
1000 TABLET ORAL ONCE
Status: COMPLETED | OUTPATIENT
Start: 2025-01-17 | End: 2025-01-17

## 2025-01-17 RX ORDER — PROMETHAZINE HYDROCHLORIDE 12.5 MG/1
25 TABLET ORAL ONCE AS NEEDED
Status: DISCONTINUED | OUTPATIENT
Start: 2025-01-17 | End: 2025-01-17 | Stop reason: HOSPADM

## 2025-01-17 RX ORDER — FERROUS SULFATE 325(65) MG
325 TABLET ORAL
Status: DISCONTINUED | OUTPATIENT
Start: 2025-01-18 | End: 2025-01-18 | Stop reason: HOSPADM

## 2025-01-17 RX ORDER — CELECOXIB 100 MG/1
200 CAPSULE ORAL ONCE
Status: COMPLETED | OUTPATIENT
Start: 2025-01-17 | End: 2025-01-17

## 2025-01-17 RX ORDER — KETOROLAC TROMETHAMINE 15 MG/ML
15 INJECTION, SOLUTION INTRAMUSCULAR; INTRAVENOUS EVERY 6 HOURS SCHEDULED
Status: COMPLETED | OUTPATIENT
Start: 2025-01-17 | End: 2025-01-18

## 2025-01-17 RX ORDER — MEPERIDINE HYDROCHLORIDE 25 MG/ML
12.5 INJECTION INTRAMUSCULAR; INTRAVENOUS; SUBCUTANEOUS
Status: DISCONTINUED | OUTPATIENT
Start: 2025-01-17 | End: 2025-01-17 | Stop reason: HOSPADM

## 2025-01-17 RX ORDER — SODIUM CHLORIDE, SODIUM LACTATE, POTASSIUM CHLORIDE, CALCIUM CHLORIDE 600; 310; 30; 20 MG/100ML; MG/100ML; MG/100ML; MG/100ML
100 INJECTION, SOLUTION INTRAVENOUS CONTINUOUS
Status: ACTIVE | OUTPATIENT
Start: 2025-01-17 | End: 2025-01-17

## 2025-01-17 RX ORDER — AMIODARONE HYDROCHLORIDE 200 MG/1
200 TABLET ORAL DAILY
Status: DISCONTINUED | OUTPATIENT
Start: 2025-01-18 | End: 2025-01-18 | Stop reason: HOSPADM

## 2025-01-17 RX ORDER — ACETAMINOPHEN 500 MG
1000 TABLET ORAL EVERY 8 HOURS
Status: DISCONTINUED | OUTPATIENT
Start: 2025-01-17 | End: 2025-01-18 | Stop reason: HOSPADM

## 2025-01-17 RX ORDER — MIDAZOLAM HYDROCHLORIDE 2 MG/2ML
2 INJECTION, SOLUTION INTRAMUSCULAR; INTRAVENOUS ONCE
Status: COMPLETED | OUTPATIENT
Start: 2025-01-17 | End: 2025-01-17

## 2025-01-17 RX ORDER — PROMETHAZINE HYDROCHLORIDE 25 MG/1
25 SUPPOSITORY RECTAL ONCE AS NEEDED
Status: DISCONTINUED | OUTPATIENT
Start: 2025-01-17 | End: 2025-01-17 | Stop reason: HOSPADM

## 2025-01-17 RX ORDER — KETOROLAC TROMETHAMINE 30 MG/ML
INJECTION, SOLUTION INTRAMUSCULAR; INTRAVENOUS AS NEEDED
Status: DISCONTINUED | OUTPATIENT
Start: 2025-01-17 | End: 2025-01-17 | Stop reason: SURG

## 2025-01-17 RX ORDER — ENOXAPARIN SODIUM 100 MG/ML
40 INJECTION SUBCUTANEOUS DAILY
Status: DISCONTINUED | OUTPATIENT
Start: 2025-01-18 | End: 2025-01-18 | Stop reason: HOSPADM

## 2025-01-17 RX ORDER — OXYCODONE HYDROCHLORIDE 5 MG/1
5 TABLET ORAL
Status: DISCONTINUED | OUTPATIENT
Start: 2025-01-17 | End: 2025-01-17 | Stop reason: HOSPADM

## 2025-01-17 RX ORDER — AMOXICILLIN 250 MG
2 CAPSULE ORAL 2 TIMES DAILY
Status: DISCONTINUED | OUTPATIENT
Start: 2025-01-17 | End: 2025-01-18 | Stop reason: HOSPADM

## 2025-01-17 RX ORDER — HYDROCODONE BITARTRATE AND ACETAMINOPHEN 7.5; 325 MG/1; MG/1
1 TABLET ORAL EVERY 4 HOURS PRN
Status: DISCONTINUED | OUTPATIENT
Start: 2025-01-17 | End: 2025-01-18 | Stop reason: HOSPADM

## 2025-01-17 RX ORDER — TRANEXAMIC ACID 10 MG/ML
1000 INJECTION, SOLUTION INTRAVENOUS ONCE
Status: COMPLETED | OUTPATIENT
Start: 2025-01-17 | End: 2025-01-17

## 2025-01-17 RX ORDER — ONDANSETRON 2 MG/ML
4 INJECTION INTRAMUSCULAR; INTRAVENOUS EVERY 6 HOURS PRN
Status: DISCONTINUED | OUTPATIENT
Start: 2025-01-17 | End: 2025-01-18 | Stop reason: HOSPADM

## 2025-01-17 RX ORDER — FENTANYL CITRATE 50 UG/ML
INJECTION, SOLUTION INTRAMUSCULAR; INTRAVENOUS AS NEEDED
Status: DISCONTINUED | OUTPATIENT
Start: 2025-01-17 | End: 2025-01-17 | Stop reason: SURG

## 2025-01-17 RX ORDER — DEXAMETHASONE SODIUM PHOSPHATE 4 MG/ML
INJECTION, SOLUTION INTRA-ARTICULAR; INTRALESIONAL; INTRAMUSCULAR; INTRAVENOUS; SOFT TISSUE AS NEEDED
Status: DISCONTINUED | OUTPATIENT
Start: 2025-01-17 | End: 2025-01-17 | Stop reason: SURG

## 2025-01-17 RX ORDER — BUPIVACAINE HYDROCHLORIDE 5 MG/ML
INJECTION, SOLUTION EPIDURAL; INTRACAUDAL
Status: COMPLETED | OUTPATIENT
Start: 2025-01-17 | End: 2025-01-17

## 2025-01-17 RX ORDER — ONDANSETRON 2 MG/ML
INJECTION INTRAMUSCULAR; INTRAVENOUS AS NEEDED
Status: DISCONTINUED | OUTPATIENT
Start: 2025-01-17 | End: 2025-01-17 | Stop reason: SURG

## 2025-01-17 RX ORDER — HYDROCODONE BITARTRATE AND ACETAMINOPHEN 7.5; 325 MG/1; MG/1
2 TABLET ORAL EVERY 4 HOURS PRN
Status: DISCONTINUED | OUTPATIENT
Start: 2025-01-17 | End: 2025-01-18 | Stop reason: HOSPADM

## 2025-01-17 RX ORDER — LIDOCAINE HYDROCHLORIDE 20 MG/ML
INJECTION, SOLUTION EPIDURAL; INFILTRATION; INTRACAUDAL; PERINEURAL AS NEEDED
Status: DISCONTINUED | OUTPATIENT
Start: 2025-01-17 | End: 2025-01-17 | Stop reason: SURG

## 2025-01-17 RX ORDER — ONDANSETRON 2 MG/ML
4 INJECTION INTRAMUSCULAR; INTRAVENOUS ONCE AS NEEDED
Status: DISCONTINUED | OUTPATIENT
Start: 2025-01-17 | End: 2025-01-17 | Stop reason: HOSPADM

## 2025-01-17 RX ORDER — MAGNESIUM HYDROXIDE 1200 MG/15ML
LIQUID ORAL AS NEEDED
Status: DISCONTINUED | OUTPATIENT
Start: 2025-01-17 | End: 2025-01-17 | Stop reason: HOSPADM

## 2025-01-17 RX ORDER — FAMOTIDINE 20 MG/1
40 TABLET, FILM COATED ORAL DAILY
Status: DISCONTINUED | OUTPATIENT
Start: 2025-01-17 | End: 2025-01-18 | Stop reason: HOSPADM

## 2025-01-17 RX ORDER — DEXMEDETOMIDINE HYDROCHLORIDE 100 UG/ML
INJECTION, SOLUTION INTRAVENOUS AS NEEDED
Status: DISCONTINUED | OUTPATIENT
Start: 2025-01-17 | End: 2025-01-17 | Stop reason: SURG

## 2025-01-17 RX ORDER — SODIUM CHLORIDE 0.9 % (FLUSH) 0.9 %
10 SYRINGE (ML) INJECTION AS NEEDED
Status: DISCONTINUED | OUTPATIENT
Start: 2025-01-17 | End: 2025-01-17 | Stop reason: HOSPADM

## 2025-01-17 RX ORDER — GLYCOPYRROLATE 0.2 MG/ML
INJECTION INTRAMUSCULAR; INTRAVENOUS AS NEEDED
Status: DISCONTINUED | OUTPATIENT
Start: 2025-01-17 | End: 2025-01-17 | Stop reason: SURG

## 2025-01-17 RX ORDER — NALOXONE HCL 0.4 MG/ML
0.4 VIAL (ML) INJECTION
Status: DISCONTINUED | OUTPATIENT
Start: 2025-01-17 | End: 2025-01-18 | Stop reason: HOSPADM

## 2025-01-17 RX ORDER — SODIUM CHLORIDE, SODIUM LACTATE, POTASSIUM CHLORIDE, CALCIUM CHLORIDE 600; 310; 30; 20 MG/100ML; MG/100ML; MG/100ML; MG/100ML
9 INJECTION, SOLUTION INTRAVENOUS CONTINUOUS PRN
Status: DISCONTINUED | OUTPATIENT
Start: 2025-01-17 | End: 2025-01-17 | Stop reason: HOSPADM

## 2025-01-17 RX ORDER — PROPOFOL 10 MG/ML
VIAL (ML) INTRAVENOUS AS NEEDED
Status: DISCONTINUED | OUTPATIENT
Start: 2025-01-17 | End: 2025-01-17 | Stop reason: SURG

## 2025-01-17 RX ORDER — ROCURONIUM BROMIDE 10 MG/ML
INJECTION, SOLUTION INTRAVENOUS AS NEEDED
Status: DISCONTINUED | OUTPATIENT
Start: 2025-01-17 | End: 2025-01-17 | Stop reason: SURG

## 2025-01-17 RX ORDER — SODIUM CHLORIDE 9 MG/ML
40 INJECTION, SOLUTION INTRAVENOUS AS NEEDED
Status: DISCONTINUED | OUTPATIENT
Start: 2025-01-17 | End: 2025-01-17 | Stop reason: HOSPADM

## 2025-01-17 RX ADMIN — SENNOSIDES AND DOCUSATE SODIUM 2 TABLET: 50; 8.6 TABLET ORAL at 20:59

## 2025-01-17 RX ADMIN — SODIUM CHLORIDE, POTASSIUM CHLORIDE, SODIUM LACTATE AND CALCIUM CHLORIDE 100 ML/HR: 600; 310; 30; 20 INJECTION, SOLUTION INTRAVENOUS at 16:36

## 2025-01-17 RX ADMIN — ONDANSETRON 4 MG: 2 INJECTION INTRAMUSCULAR; INTRAVENOUS at 21:44

## 2025-01-17 RX ADMIN — SODIUM CHLORIDE, POTASSIUM CHLORIDE, SODIUM LACTATE AND CALCIUM CHLORIDE 9 ML/HR: 600; 310; 30; 20 INJECTION, SOLUTION INTRAVENOUS at 09:34

## 2025-01-17 RX ADMIN — ROCURONIUM BROMIDE 50 MG: 50 INJECTION INTRAVENOUS at 13:13

## 2025-01-17 RX ADMIN — PROPOFOL 200 MG: 10 INJECTION, EMULSION INTRAVENOUS at 13:13

## 2025-01-17 RX ADMIN — DEXMEDETOMIDINE HYDROCHLORIDE 30 MCG: 100 INJECTION, SOLUTION, CONCENTRATE INTRAVENOUS at 13:13

## 2025-01-17 RX ADMIN — HYDROMORPHONE HYDROCHLORIDE 0.5 MG: 1 INJECTION, SOLUTION INTRAMUSCULAR; INTRAVENOUS; SUBCUTANEOUS at 13:45

## 2025-01-17 RX ADMIN — ACETAMINOPHEN 1000 MG: 500 TABLET ORAL at 16:45

## 2025-01-17 RX ADMIN — DEXMEDETOMIDINE HYDROCHLORIDE 50 MCG: 100 INJECTION, SOLUTION, CONCENTRATE INTRAVENOUS at 13:18

## 2025-01-17 RX ADMIN — DEXAMETHASONE SODIUM PHOSPHATE 8 MG: 4 INJECTION, SOLUTION INTRAMUSCULAR; INTRAVENOUS at 13:13

## 2025-01-17 RX ADMIN — SODIUM CHLORIDE, POTASSIUM CHLORIDE, SODIUM LACTATE AND CALCIUM CHLORIDE: 600; 310; 30; 20 INJECTION, SOLUTION INTRAVENOUS at 13:42

## 2025-01-17 RX ADMIN — OXYCODONE HYDROCHLORIDE 5 MG: 5 TABLET ORAL at 15:57

## 2025-01-17 RX ADMIN — PROPOFOL 50 MG: 10 INJECTION, EMULSION INTRAVENOUS at 13:18

## 2025-01-17 RX ADMIN — DEXMEDETOMIDINE HYDROCHLORIDE 20 MCG: 100 INJECTION, SOLUTION, CONCENTRATE INTRAVENOUS at 14:44

## 2025-01-17 RX ADMIN — KETOROLAC TROMETHAMINE 30 MG: 30 INJECTION, SOLUTION INTRAMUSCULAR; INTRAVENOUS at 14:28

## 2025-01-17 RX ADMIN — SODIUM CHLORIDE 2 G: 9 INJECTION, SOLUTION INTRAVENOUS at 13:19

## 2025-01-17 RX ADMIN — TRANEXAMIC ACID 1000 MG: 10 INJECTION, SOLUTION INTRAVENOUS at 12:11

## 2025-01-17 RX ADMIN — CELECOXIB 200 MG: 100 CAPSULE ORAL at 09:32

## 2025-01-17 RX ADMIN — ONDANSETRON 4 MG: 2 INJECTION INTRAMUSCULAR; INTRAVENOUS at 14:28

## 2025-01-17 RX ADMIN — FENTANYL CITRATE 100 MCG: 50 INJECTION, SOLUTION INTRAMUSCULAR; INTRAVENOUS at 13:13

## 2025-01-17 RX ADMIN — ACETAMINOPHEN 1000 MG: 500 TABLET ORAL at 09:32

## 2025-01-17 RX ADMIN — KETOROLAC TROMETHAMINE 15 MG: 15 INJECTION, SOLUTION INTRAMUSCULAR; INTRAVENOUS at 18:31

## 2025-01-17 RX ADMIN — MIDAZOLAM HYDROCHLORIDE 2 MG: 1 INJECTION, SOLUTION INTRAMUSCULAR; INTRAVENOUS at 12:11

## 2025-01-17 RX ADMIN — LIDOCAINE HYDROCHLORIDE 50 MG: 20 INJECTION, SOLUTION INTRAVENOUS at 14:34

## 2025-01-17 RX ADMIN — GLYCOPYRROLATE 0.1 MG: 0.2 INJECTION INTRAMUSCULAR; INTRAVENOUS at 13:13

## 2025-01-17 RX ADMIN — BUPIVACAINE HYDROCHLORIDE 30 ML: 5 INJECTION, SOLUTION EPIDURAL; INTRACAUDAL; PERINEURAL at 12:41

## 2025-01-17 RX ADMIN — HYDROMORPHONE HYDROCHLORIDE 0.5 MG: 1 INJECTION, SOLUTION INTRAMUSCULAR; INTRAVENOUS; SUBCUTANEOUS at 15:58

## 2025-01-17 RX ADMIN — FAMOTIDINE 40 MG: 20 TABLET, FILM COATED ORAL at 16:46

## 2025-01-17 RX ADMIN — SODIUM CHLORIDE 2000 MG: 9 INJECTION, SOLUTION INTRAVENOUS at 20:59

## 2025-01-17 RX ADMIN — SUGAMMADEX 200 MG: 100 INJECTION, SOLUTION INTRAVENOUS at 14:34

## 2025-01-17 RX ADMIN — HYDROCODONE BITARTRATE AND ACETAMINOPHEN 1 TABLET: 7.5; 325 TABLET ORAL at 20:59

## 2025-01-17 RX ADMIN — TRANEXAMIC ACID 1000 MG: 10 INJECTION, SOLUTION INTRAVENOUS at 14:08

## 2025-01-17 NOTE — ANESTHESIA POSTPROCEDURE EVALUATION
Patient: Carlos Manuel Sanchez    Procedure Summary       Date: 01/17/25 Room / Location: [facility] OR 03 / [facility] MAIN OR    Anesthesia Start: 1309 Anesthesia Stop: 1450    Procedure: LEFT TOTAL KNEE ARTHROPLASTY: Replace left knee with artificial implants (Left: Knee) Diagnosis:       Osteoarthritis of left knee, unspecified osteoarthritis type      (Osteoarthritis of left knee, unspecified osteoarthritis type [M17.12])    Surgeons: Lisa James MD Provider: Que Funk MD    Anesthesia Type: general, regional ASA Status: 3            Anesthesia Type: general, regional    Vitals  Vitals Value Taken Time   BP 98/54 01/17/25 1453   Temp 36.1 °C (96.9 °F) 01/17/25 1450   Pulse 60 01/17/25 1456   Resp 12 01/17/25 1450   SpO2 93 % 01/17/25 1456   Vitals shown include unfiled device data.        Post Anesthesia Care and Evaluation    Patient location during evaluation: bedside  Patient participation: complete - patient participated  Level of consciousness: awake and alert  Pain management: adequate    Airway patency: patent  Anesthetic complications: No anesthetic complications  PONV Status: none  Cardiovascular status: acceptable  Respiratory status: acceptable  Hydration status: acceptable    Comments: An Anesthesiologist personally participated in the most demanding procedures (including induction and emergence if applicable) in the anesthesia plan, monitored the course of anesthesia administration at frequent intervals and remained physically present and available for immediate diagnosis and treatment of emergencies.

## 2025-01-17 NOTE — H&P
[facility] HISTORY AND PHYSICAL  Date: 2025   Patient Name: Carlos Manuel Sanchez  : 1960  MRN: [id number]  Primary Care Physician:  Kurt Arango DO  Date of admission: 2025    Subjective   Subjective     Chief Complaint: Osteoarthritis    HPI:    Carlos Manuel Sanchez is a 64 y.o. male with past medical history of A-fib on Eliquis, prediabetes, hypertension, hyperlipidemia.  He presents today for elective left knee replacement.  No immediate postoperative complications.  Patient was resting comfortably in the bedside chair, denies chest pain, shortness breath, or abdominal pain.      Personal History     Past Medical History:  Past Medical History:   Diagnosis Date    Arthritis     Atrial fibrillation     Borderline diabetic     Colon polyp 10/18/2021    Coronary artery disease     BLOCKAGE 1 STENT PLACED , SEE'S DR WARD, DENIED CP/SOA    Diabetes mellitus 2/15/24    HTN (hypertension)     Hyperlipemia     Rotator cuff syndrome 2022    When pulling back compound bow during hunting season    Rotator cuff tear, right        Past Surgical History:  Past Surgical History:   Procedure Laterality Date    APPENDECTOMY      COLONOSCOPY N/A 10/18/2021    Procedure: COLONOSCOPY with hot snare polypectomies, clips applied x2;  Surgeon: Haroldo Perez MD;  Location: [facility] ENDOSCOPY;  Service: Gastroenterology;  Laterality: N/A;  cecum polyp, transverse polyp    COLONOSCOPY N/A 2024    Procedure: COLONOSCOPY;  Surgeon: Haroldo Perez MD;  Location: [facility] ENDOSCOPY;  Service: Gastroenterology;  Laterality: N/A;  diverticulosis    CORONARY STENT PLACEMENT      1 STENT PLACED    EXCISION LESION Right 2022    Procedure: excision of subcutaneous mass from right arm;  Surgeon: Jeff Tomas MD;  Location: [facility] MAIN OR;  Service: General;  Laterality: Right;    KNEE ARTHROSCOPY W/ MENISCECTOMY Left 1/3/2024    Procedure: LEFT KNEE ARTHROSCOPY WITH PARTIAL  MEDIAL MENISCECTOMY CHONDROPLASTY;  Surgeon: Lisa James MD;  [facility]: [facility] OR [facility];  Service: Orthopedics;  Laterality: Left;    ROTATOR CUFF REPAIR Left     SHOULDER ARTHROSCOPY W/ ROTATOR CUFF REPAIR Right 01/25/2023    Procedure: SHOULDER ARTHROSCOPY WITH ROTATOR CUFF REPAIR, SUBCROMIAL DECOMPRESSION,DISTAL CLAVICLE RESECTION;  Surgeon: Lisa James MD;  Location: [facility];  Service: Orthopedics;  Laterality: Right;    SHOULDER SURGERY  01/25/2023    Right shoulder        Family History:   Family History   Problem Relation Age of Onset    Heart disease Mother     Heart disease Father     Cancer Father         Lung cancer    Heart disease Sister     Heart disease Brother     Lung cancer Other     Malig Hyperthermia Neg Hx     Colon cancer Neg Hx         Social History:   Social History     Socioeconomic History    Marital status:    Tobacco Use    Smoking status: Never     Passive exposure: Past    Smokeless tobacco: Never    Tobacco comments:     Never use   Vaping Use    Vaping status: Never Used   Substance and Sexual Activity    Alcohol use: Yes     Alcohol/week: 10.0 standard drinks of alcohol     Types: 10 Cans of beer per week     Comment: Social    Drug use: Never    Sexual activity: Defer     Partners: Female     Birth control/protection: Vasectomy        Home Medications:  B Complete, Evolocumab, amiodarone, apixaban, aspirin, lisinopril, metoprolol succinate XL, rosuvastatin, and sildenafil    Allergies:  No Known Allergies    Review of Systems   Denies chest pain, shortness of breath, abdominal pain    Objective   Objective     Vitals:   Temp:  [96.9 °F (36.1 °C)-98.8 °F (37.1 °C)] 97.3 °F (36.3 °C)  Heart Rate:  [47-73] 73  Resp:  [12-20] 16  BP: ()/(50-84) 100/62  Flow (L/min) (Oxygen Therapy):  [2-4] 2    Physical Exam   Gen: NAD, Alert and Oriented  Pulm: No respiratory distress  Abd: soft, nondistended  Extremities: no pitting edema    Result Review    Result  Review:  I have personally reviewed the results from the time of this admission to 1/17/2025 17:00 EST and agree with these findings:  [x]  Laboratory  []  Microbiology  []  Radiology  []  EKG/Telemetry   []  Cardiology/Vascular   []  Pathology  []  Old records  []  Other:      Assessment & Plan   Assessment / Plan     Assessment:  Osteoarthritis  Left knee replacement  A-fib on Eliquis  Hypertension  Hyperlipidemia  Prediabetes      Plan:   Outpatient in a bed  Multimodal pain control  Bowel regiment  Ortho following  PT/OT  Preop labs reviewed.  Home meds reviewed.  Resume home amiodarone.  A.m. labs      VTE Prophylaxis:  Pharmacologic & mechanical VTE prophylaxis orders are present.        CODE STATUS:           Electronically signed by Lorraine Bernard DO, 01/17/25, 5:00 PM EST.

## 2025-01-17 NOTE — ANESTHESIA PROCEDURE NOTES
Peripheral Block      Patient reassessed immediately prior to procedure    Patient location during procedure: pre-op  Reason for block: at surgeon's request and post-op pain management  Preanesthetic Checklist  Completed: patient identified, IV checked, site marked, risks and benefits discussed, surgical consent, monitors and equipment checked, pre-op evaluation and timeout performed  Prep:  Pt Position: supine  Sterile barriers:alcohol skin prep, partial drape, cap, washed/disinfected hands, mask and gloves  Prep: ChloraPrep  Patient monitoring: blood pressure monitoring, continuous pulse oximetry and EKG  Procedure    Sedation: yes  Performed under: local infiltration  Guidance:ultrasound guided and nerve stimulator    ULTRASOUND INTERPRETATION.  Using ultrasound guidance a 20 G gauge needle was placed in close proximity to the nerve, at which point, under ultrasound guidance anesthetic was injected in the area of the nerve and spread of the anesthesia was seen on ultrasound in close proximity thereto.  There were no abnormalities seen on ultrasound; a digital image was taken; and the patient tolerated the procedure with no complications. Images:still images obtained, printed/placed on chart    Block Type:adductor canal block  Injection Technique:single-shot  Needle Type:echogenic  Needle Gauge:20 G (4in)  Resistance on Injection: none          Post Assessment  Injection Assessment: negative aspiration for heme, no paresthesia on injection and incremental injection  Patient Tolerance:comfortable throughout block  Complications:no  Additional Notes  The block or continuous infusion is requested by the referring physician for management of postoperative pain, or pain related to a procedure. Ultrasound guidance (deemed medically necessary). Painless injection, pt was awake and conversant during the procedure without complications. Needle and surrounding structures visualized throughout procedure. No adverse reactions  or complications seen during this period. Post-procedure image showed no signs of complication, and anatomy was consistent with an uncomplicated nerve blockade.  Performed by: Jose Manuel Lewis MD

## 2025-01-17 NOTE — ANESTHESIA PROCEDURE NOTES
Peripheral Block      Patient reassessed immediately prior to procedure    Patient location during procedure: pre-op  Reason for block: at surgeon's request and post-op pain management  Preanesthetic Checklist  Completed: patient identified, IV checked, site marked, risks and benefits discussed, surgical consent, monitors and equipment checked, pre-op evaluation and timeout performed  Prep:  Pt Position: supine  Sterile barriers:alcohol skin prep, partial drape, cap, washed/disinfected hands, mask and gloves  Prep: ChloraPrep  Patient monitoring: blood pressure monitoring, continuous pulse oximetry and EKG  Procedure    Sedation: yes  Performed under: local infiltration  Guidance:ultrasound guided and nerve stimulator    ULTRASOUND INTERPRETATION.  Using ultrasound guidance a 20 G gauge needle was placed in close proximity to the nerve, at which point, under ultrasound guidance anesthetic was injected in the area of the nerve and spread of the anesthesia was seen on ultrasound in close proximity thereto.  There were no abnormalities seen on ultrasound; a digital image was taken; and the patient tolerated the procedure with no complications. Images:still images obtained, printed/placed on chart    Laterality:left  Block Type:adductor canal block  Injection Technique:single-shot  Needle Type:echogenic  Needle Gauge:20 G (4in)  Resistance on Injection: none    Medications Used: bupivacaine (PF) (MARCAINE) 0.5 % injection - Injection   30 mL - 1/17/2025 12:41:00 PM      Post Assessment  Injection Assessment: negative aspiration for heme, no paresthesia on injection and incremental injection  Patient Tolerance:comfortable throughout block  Complications:no  Additional Notes  The block or continuous infusion is requested by the referring physician for management of postoperative pain, or pain related to a procedure. Ultrasound guidance (deemed medically necessary). Painless injection, pt was awake and conversant during  the procedure without complications. Needle and surrounding structures visualized throughout procedure. No adverse reactions or complications seen during this period. Post-procedure image showed no signs of complication, and anatomy was consistent with an uncomplicated nerve blockade.  Performed by: Que Funk MD

## 2025-01-17 NOTE — PLAN OF CARE
Goal Outcome Evaluation:  Plan of Care Reviewed With: patient           Outcome Evaluation: Pt is A&O X 4, on 2L NC, and X 2 assist with walker and gait belt. All scheduled medications given as ordered. Pt complained of pain rating 4/10 but relieved with ice packs, positioning, and elevation. Safety checks maintained throughout shift with pt resting in chair, wheels to chair locked, and personal items and call light within reach.

## 2025-01-17 NOTE — OP NOTE
TOTAL KNEE ARTHROPLASTY  Procedure Report    Patient Name:  Carlos Manuel Sanchez  YOB: 1960    Date of Surgery:  1/17/2025     Indications:  Severe OA, failed conservative treatment    Pre-op Diagnosis:   Osteoarthritis of left knee, unspecified osteoarthritis type [M17.12]       Post-Op Diagnosis Codes:     * Osteoarthritis of left knee, unspecified osteoarthritis type [M17.12]    Procedure/CPT® Codes:      Procedure(s):  LEFT TOTAL KNEE ARTHROPLASTY: Replace left knee with artificial implants    Surgical Approach: Knee Medial Parapatellar        Staff:  Surgeon(s):  Lisa James MD    Assistant: Maynor Zhang Megan D    Anesthesia: Choice    Estimated Blood Loss: 100 mL    Implants:    Implant Name Type Inv. Item Serial No.  Lot No. LRB No. Used Action   CMT BONE PALACOS R HI/VISC 1X40 - [id number] Implant CMT BONE PALACOS R HI/VISC 1X40  [facility] [id number] Left 2 Implanted   PAT ARCOM W/WIRE 3PEG 34MM - [id number] Implant PAT ARCOM W/WIRE 3PEG 34MM  [employer] [id number] Left 1 Implanted   TRY TIB CRUC 75MM - [id number] Implant TRY TIB CRUC 75MM  [employer] [id number] Left 1 Implanted   COMP FEM/KN VANGUARD INTLK CR 72.5MM NS LT - [id number] Implant COMP FEM/KN VANGUARD INTLK CR 72.5MM NS LT  [employer] [id number] Left 1 Implanted   BEAR TIB/KN VANGUARD AS [id number] NS - [id number] Implant BEAR TIB/KN VANGUARD AS [id number] NS  [employer] [id number] Left 1 Implanted       Specimen:          None        Findings: advance OA    Complications: None    Description of Procedure: The patient was brought to the operating room and underwent general anesthesia, had a preoperative antibiotic, and was then prepped and draped in sterile fashion. An Esmarch was used to exsanguinate the limb. The tourniquet was then inflated. A standard medial parapatellar arthrotomy was performed. An intramedullary guide was placed in the femur. A distal femoral cut was made and measured. The  4-in-1 block was placed. Excellent cuts were achieved. Bone was removed, followed by removal of the ACL and remaining menisci. The intramedullary guide was placed in the tibia. The tibia was then cut and measured. This was then broached. The patella was then osteotomized, removing approximately 8-10 mm of bone. It measured. There was excellent range of motion, tracking and stability of the trials. The trials were removed. Bony cut surfaces were thoroughly irrigated. The knee was then injected. The cement was vacuum mixed, placed on the undersurface of the components and then packed into place. Excess cement was removed. Once this had hardened, trial polys were tried and the appropriate sized anterior insert was then chosen. This was then locked, thoroughly irrigated. Bovie electrocautery was used for hemostasis. The tourniquet was deflated. This was again thoroughly irrigated and closed using #1 Vicryl, 2-0 Vicryl and staples. A sterile dressing was applied. The patient was then extubated and taken to the recovery room in stable condition. There were no complications.    Assistant: Maynor Zhang Megan D  was responsible for performing the following activities: Retraction, Suction, Irrigation, Closing, and Placing Dressing and their skilled assistance was necessary for the success of this case.    Lisa James MD     Date: 1/17/2025  Time: 14:49 EST

## 2025-01-17 NOTE — ANESTHESIA PREPROCEDURE EVALUATION
Anesthesia Evaluation     Patient summary reviewed and Nursing notes reviewed   no history of anesthetic complications:   NPO Solid Status: > 8 hours  NPO Liquid Status: > 2 hours           Airway   Mallampati: III  TM distance: >3 FB  Neck ROM: full  No difficulty expected and Large neck circumference  Comment: Facial hair    Dental          Pulmonary - negative pulmonary ROS and normal exam    breath sounds clear to auscultation  Cardiovascular - normal exam  Exercise tolerance: good (4-7 METS)    ECG reviewed  Rhythm: regular  Rate: normal    (+) hypertension, CAD, cardiac stents (2000) more than 12 months ago , dysrhythmias Atrial Fib, hyperlipidemia      Neuro/Psych- negative ROS  GI/Hepatic/Renal/Endo    (+) obesity, diabetes mellitus    Musculoskeletal     Abdominal    Substance History   (+) alcohol use (2-3 drinks/day)     OB/GYN negative ob/gyn ROS         Other   arthritis,     ROS/Med Hx Other: HX AFIB, HTN, CAD W/ STENT 2000, DM. METS>4.   CLEARED BY DR. WARD 1/7/25.  LAST O.V. DR. WARD 12/5/24. PT WAS TO GET ABLATION FOR A-FIB 1/6/25 (MD WANTED PT TO HAVE DUE TO LONGTERM SIDE EFFECTS OF AMIDOARONE) BUT CX DUE TO WEATHER.  4/28/23 ECHO 56%.  ELM     Last Eliquis was 3 days ago              Anesthesia Plan    ASA 3     general and regional     (Patient understands anesthesia not responsible for dental damage. Regional anesthesia options discussed with patient. Pt accepts regional block.)  intravenous induction     Anesthetic plan, risks, benefits, and alternatives have been provided, discussed and informed consent has been obtained with: patient.    Use of blood products discussed with patient .    Plan discussed with CRNA.    CODE STATUS:

## 2025-01-18 VITALS
SYSTOLIC BLOOD PRESSURE: 158 MMHG | HEIGHT: 70 IN | DIASTOLIC BLOOD PRESSURE: 76 MMHG | HEART RATE: 64 BPM | BODY MASS INDEX: 29.83 KG/M2 | TEMPERATURE: 97.7 F | WEIGHT: 208.34 LBS | OXYGEN SATURATION: 93 % | RESPIRATION RATE: 16 BRPM

## 2025-01-18 LAB
ANION GAP SERPL CALCULATED.3IONS-SCNC: 8.8 MMOL/L (ref 5–15)
BUN SERPL-MCNC: 19 MG/DL (ref 8–23)
BUN/CREAT SERPL: 17.8 (ref 7–25)
CALCIUM SPEC-SCNC: 8.2 MG/DL (ref 8.6–10.5)
CHLORIDE SERPL-SCNC: 96 MMOL/L (ref 98–107)
CO2 SERPL-SCNC: 23.2 MMOL/L (ref 22–29)
CREAT SERPL-MCNC: 1.07 MG/DL (ref 0.76–1.27)
DEPRECATED RDW RBC AUTO: 38.9 FL (ref 37–54)
EGFRCR SERPLBLD CKD-EPI 2021: 78 ML/MIN/1.73
ERYTHROCYTE [DISTWIDTH] IN BLOOD BY AUTOMATED COUNT: 11.8 % (ref 12.3–15.4)
GLUCOSE SERPL-MCNC: 210 MG/DL (ref 65–99)
HCT VFR BLD AUTO: 36.7 % (ref 37.5–51)
HGB BLD-MCNC: 12.2 G/DL (ref 13–17.7)
MCH RBC QN AUTO: 30.3 PG (ref 26.6–33)
MCHC RBC AUTO-ENTMCNC: 33.2 G/DL (ref 31.5–35.7)
MCV RBC AUTO: 91.1 FL (ref 79–97)
PLATELET # BLD AUTO: 153 10*3/MM3 (ref 140–450)
PMV BLD AUTO: 9.1 FL (ref 6–12)
POTASSIUM SERPL-SCNC: 5 MMOL/L (ref 3.5–5.2)
RBC # BLD AUTO: 4.03 10*6/MM3 (ref 4.14–5.8)
SODIUM SERPL-SCNC: 128 MMOL/L (ref 136–145)
WBC NRBC COR # BLD AUTO: 12.15 10*3/MM3 (ref 3.4–10.8)

## 2025-01-18 PROCEDURE — 85027 COMPLETE CBC AUTOMATED: CPT | Performed by: ORTHOPAEDIC SURGERY

## 2025-01-18 PROCEDURE — 25010000002 CEFAZOLIN PER 500 MG: Performed by: ORTHOPAEDIC SURGERY

## 2025-01-18 PROCEDURE — 97535 SELF CARE MNGMENT TRAINING: CPT

## 2025-01-18 PROCEDURE — 97116 GAIT TRAINING THERAPY: CPT

## 2025-01-18 PROCEDURE — 25010000002 KETOROLAC TROMETHAMINE PER 15 MG: Performed by: ORTHOPAEDIC SURGERY

## 2025-01-18 PROCEDURE — 97161 PT EVAL LOW COMPLEX 20 MIN: CPT

## 2025-01-18 PROCEDURE — 25010000002 ENOXAPARIN PER 10 MG: Performed by: ORTHOPAEDIC SURGERY

## 2025-01-18 PROCEDURE — 97165 OT EVAL LOW COMPLEX 30 MIN: CPT

## 2025-01-18 PROCEDURE — 97150 GROUP THERAPEUTIC PROCEDURES: CPT

## 2025-01-18 PROCEDURE — 99214 OFFICE O/P EST MOD 30 MIN: CPT | Performed by: STUDENT IN AN ORGANIZED HEALTH CARE EDUCATION/TRAINING PROGRAM

## 2025-01-18 PROCEDURE — 97530 THERAPEUTIC ACTIVITIES: CPT

## 2025-01-18 PROCEDURE — 80048 BASIC METABOLIC PNL TOTAL CA: CPT | Performed by: ORTHOPAEDIC SURGERY

## 2025-01-18 RX ORDER — HYDROCODONE BITARTRATE AND ACETAMINOPHEN 7.5; 325 MG/1; MG/1
1-2 TABLET ORAL EVERY 4 HOURS PRN
Qty: 40 TABLET | Refills: 0 | Status: SHIPPED | OUTPATIENT
Start: 2025-01-18 | End: 2025-01-23 | Stop reason: SDUPTHER

## 2025-01-18 RX ADMIN — ACETAMINOPHEN 1000 MG: 500 TABLET ORAL at 09:11

## 2025-01-18 RX ADMIN — FAMOTIDINE 40 MG: 20 TABLET, FILM COATED ORAL at 09:11

## 2025-01-18 RX ADMIN — SODIUM CHLORIDE 2000 MG: 9 INJECTION, SOLUTION INTRAVENOUS at 04:54

## 2025-01-18 RX ADMIN — AMIODARONE HYDROCHLORIDE 200 MG: 200 TABLET ORAL at 09:11

## 2025-01-18 RX ADMIN — KETOROLAC TROMETHAMINE 15 MG: 15 INJECTION, SOLUTION INTRAMUSCULAR; INTRAVENOUS at 06:15

## 2025-01-18 RX ADMIN — ENOXAPARIN SODIUM 40 MG: 100 INJECTION SUBCUTANEOUS at 09:11

## 2025-01-18 RX ADMIN — SENNOSIDES AND DOCUSATE SODIUM 2 TABLET: 50; 8.6 TABLET ORAL at 09:11

## 2025-01-18 RX ADMIN — FERROUS SULFATE TAB 325 MG (65 MG ELEMENTAL FE) 325 MG: 325 (65 FE) TAB at 09:11

## 2025-01-18 RX ADMIN — KETOROLAC TROMETHAMINE 15 MG: 15 INJECTION, SOLUTION INTRAMUSCULAR; INTRAVENOUS at 00:24

## 2025-01-18 RX ADMIN — KETOROLAC TROMETHAMINE 15 MG: 15 INJECTION, SOLUTION INTRAMUSCULAR; INTRAVENOUS at 12:38

## 2025-01-18 NOTE — PLAN OF CARE
Goal Outcome Evaluation:  Plan of Care Reviewed With: patient        Progress: improving  Outcome Evaluation: Pt presents with decreased functional mobility secondary to recent L TKR.  Presence of decreased strength, decreased ROM, and pain contribute to pts dysfunction. Skilled PT intervention is needed to address noted deficits in order to restore to PLOF.    Anticipated Discharge Disposition (PT): home with outpatient therapy services

## 2025-01-18 NOTE — THERAPY EVALUATION
Acute Care - Physical Therapy Initial Evaluation   [place]     Patient Name: Carlos Manuel Sanchez  : 1960  MRN: [id number]  Today's Date: 2025   Onset of Illness/Injury or Date of Surgery: 25  Visit Dx:     ICD-10-CM ICD-9-CM   1. Impaired mobility and ADLs  Z74.09 V49.89    Z78.9    2. Osteoarthritis of left knee, unspecified osteoarthritis type  M17.12 715.96   3. Difficulty in walking  R26.2 719.7     Patient Active Problem List   Diagnosis    Hyperlipidemia LDL goal <70    Primary hypertension    Colon cancer screening    Urticaria due to cold    Mass of arm, right    CAD S/P percutaneous coronary angioplasty    Rotator cuff tear, right    Atrial fibrillation, persistent    Type 2 diabetes mellitus with hyperglycemia, without long-term current use of insulin    Preop examination    Acute medial meniscus tear of left knee    OA (osteoarthritis) of knee    History of colon polyps     Past Medical History:   Diagnosis Date    Arthritis     Atrial fibrillation     Borderline diabetic     Colon polyp 10/18/2021    Coronary artery disease     BLOCKAGE 1 STENT PLACED , SEE'S DR WARD, DENIED CP/SOA    Diabetes mellitus 2/15/24    HTN (hypertension)     Hyperlipemia     Rotator cuff syndrome 2022    When pulling back compound bow during hunting season    Rotator cuff tear, right      Past Surgical History:   Procedure Laterality Date    APPENDECTOMY      COLONOSCOPY N/A 10/18/2021    Procedure: COLONOSCOPY with hot snare polypectomies, clips applied x2;  Surgeon: Haroldo Perez MD;  Location: [facility] ENDOSCOPY;  Service: Gastroenterology;  Laterality: N/A;  cecum polyp, transverse polyp    COLONOSCOPY N/A 2024    Procedure: COLONOSCOPY;  Surgeon: Haroldo Perez MD;  Location: [facility] ENDOSCOPY;  Service: Gastroenterology;  Laterality: N/A;  diverticulosis    CORONARY STENT PLACEMENT      1 STENT PLACED    EXCISION LESION Right 2022    Procedure: excision of  subcutaneous mass from right arm;  Surgeon: Jeff Tomas MD;  Location: [facility] MAIN OR;  Service: General;  Laterality: Right;    KNEE ARTHROSCOPY W/ MENISCECTOMY Left 1/3/2024    Procedure: LEFT KNEE ARTHROSCOPY WITH PARTIAL MEDIAL MENISCECTOMY CHONDROPLASTY;  Surgeon: Lisa James MD;  Location: [facility] OR [facility];  Service: Orthopedics;  Laterality: Left;    ROTATOR CUFF REPAIR Left     SHOULDER ARTHROSCOPY W/ ROTATOR CUFF REPAIR Right 01/25/2023    Procedure: SHOULDER ARTHROSCOPY WITH ROTATOR CUFF REPAIR, SUBCROMIAL DECOMPRESSION,DISTAL CLAVICLE RESECTION;  Surgeon: Lisa James MD;  Location: [facility] OR [facility];  Service: Orthopedics;  Laterality: Right;    SHOULDER SURGERY  01/25/2023    Right shoulder     PT Assessment (Last 12 Hours)       PT Evaluation and Treatment       Row Name 01/18/25 1256          Physical Therapy Time and Intention    Subjective Information complains of;fatigue;pain  -DW     Document Type evaluation  -DW     Mode of Treatment individual therapy;physical therapy  -DW     Patient Effort excellent  -DW     Symptoms Noted During/After Treatment fatigue  -DW       Row Name 01/18/25 1256          General Information    Patient Profile Reviewed yes  -DW     Onset of Illness/Injury or Date of Surgery 01/17/25  -DW     Referring Physician Lorraine Bernard  -DW     Patient Observations alert  -DW     Prior Level of Function independent:;all household mobility;community mobility;ADL's  -DW     Equipment Currently Used at Home none  -DW     Equipment Ordered for Patient walker, front wheeled  -DW     Risks Reviewed patient:  -DW     Benefits Reviewed patient:;improve function;increase independence;increase strength;increase balance  -DW     Barriers to Rehab none identified  -DW       Row Name 01/18/25 1256          Previous Level of Function/Home Environm    BADLs, Previous Functional Level independent  -DW     IADLs, Previous Functional Level independent  -DW     Bed Mobility, Previous  Functional Level independent  -DW     Transfers, Previous Functional Level independent  -DW     Household Ambulation, Previous Functional Level independent  -DW     Community Ambulation, Previous Functional Level independent  -DW       Row Name 01/18/25 1256          Living Environment    Current Living Arrangements home  -DW     People in Home spouse  -DW     Primary Care Provided by self  -       Row Name 01/18/25 1256          Home Use of Assistive/Adaptive Equipment    Equipment Currently Used at Home none  -       Row Name 01/18/25 1256          Pain    Pretreatment Pain Rating 2/10  -DW     Posttreatment Pain Rating 2/10  -DW     Pain Location knee  -     Pain Side/Orientation left  -DW       Row Name 01/18/25 1256          Cognition    Orientation Status (Cognition) oriented x 3  -DW     Follows Commands (Cognition) WNL  -       Row Name 01/18/25 1256          Range of Motion Comprehensive    General Range of Motion lower extremity range of motion deficits identified  -     Comment, General Range of Motion L knee ROM 10-80 AROM  -       Row Name 01/18/25 1256          Strength Comprehensive (MMT)    General Manual Muscle Testing (MMT) Assessment lower extremity strength deficits identified  -     Comment, General Manual Muscle Testing (MMT) Assessment R LE WFL; L LE 3/5  -       Row Name 01/18/25 1256          Mobility    Extremity Weight-bearing Status left lower extremity  -DW     Left Lower Extremity (Weight-bearing Status) weight-bearing as tolerated (WBAT)  -       Row Name 01/18/25 1256          Bed Mobility    Bed Mobility other (see comments)  did not assess pt up in chair on arrival  -       Row Name 01/18/25 1256          Transfers    Transfers sit-stand transfer;stand-sit transfer  -       Row Name 01/18/25 1256          Sit-Stand Transfer    Sit-Stand [place] (Transfers) contact guard  -     Assistive Device (Sit-Stand Transfers) walker, front-wheeled  -       Row  Name 01/18/25 1256          Stand-Sit Transfer    Stand-Sit [place] (Transfers) contact guard  -DW     Assistive Device (Stand-Sit Transfers) walker, front-wheeled  -DW       Row Name 01/18/25 1256          Gait/Stairs (Locomotion)    Gait/Stairs Locomotion gait/ambulation independence;gait/ambulation assistive device;distance ambulated  -DW     [place] Level (Gait) contact guard  -DW     Assistive Device (Gait) walker, front-wheeled  -DW     Patient was able to Ambulate yes  -DW     Distance in Feet (Gait) 200  -DW     Deviations/Abnormal Patterns (Gait) gait speed decreased;stride length decreased;weight shifting decreased  -DW       Row Name 01/18/25 1256          Safety Issues/Impairments Affecting Functional Mobility    Impairments Affecting Function (Mobility) balance;endurance/activity tolerance  -DW       Row Name 01/18/25 1256          Balance    Balance Assessment standing dynamic balance  -DW     Dynamic Standing Balance contact guard;1-person assist  -DW     Position/Device Used, Standing Balance walker, front-wheeled  -DW       Row Name             Wound 01/17/25 1335 Left anterior knee    Wound - Properties Group Placement Date: 01/17/25  -RK Placement Time: 1335  -RK Side: Left  -RK Orientation: anterior  -RK Location: knee  -RK Primary Wound Type: Incision  -RK    Retired Wound - Properties Group Placement Date: 01/17/25  -RK Placement Time: 1335  -RK Side: Left  -RK Orientation: anterior  -RK Location: knee  -RK Primary Wound Type: Incision  -RK    Retired Wound - Properties Group Placement Date: 01/17/25  -RK Placement Time: 1335  -RK Side: Left  -RK Orientation: anterior  -RK Location: knee  -RK Primary Wound Type: Incision  -RK    Retired Wound - Properties Group Date first assessed: 01/17/25  -RK Time first assessed: 1335  -RK Side: Left  -RK Location: knee  -RK Primary Wound Type: Incision  -RK      Row Name             Wound 01/17/25 1400 Left proximal knee    Wound - Properties  Group Placement Date: 01/17/25  -LS Placement Time: 1400  -LS Side: Left  -LS Orientation: proximal  -LS Location: knee  -LS Primary Wound Type: Incision  -LS    Retired Wound - Properties Group Placement Date: 01/17/25  -LS Placement Time: 1400  -LS Side: Left  -LS Orientation: proximal  -LS Location: knee  -LS Primary Wound Type: Incision  -LS    Retired Wound - Properties Group Placement Date: 01/17/25  -LS Placement Time: 1400  -LS Side: Left  -LS Orientation: proximal  -LS Location: knee  -LS Primary Wound Type: Incision  -LS    Retired Wound - Properties Group Date first assessed: 01/17/25  -LS Time first assessed: 1400  -LS Side: Left  -LS Location: knee  -LS Primary Wound Type: Incision  -LS      Row Name 01/18/25 1256          Plan of Care Review    Plan of Care Reviewed With patient  -DW     Progress improving  -     Outcome Evaluation Pt presents with decreased functional mobility secondary to recent L TKR.  Presence of decreased strength, decreased ROM, and pain contribute to pts dysfunction. Skilled PT intervention is needed to address noted deficits in order to restore to PLOF.  -       Row Name 01/18/25 1256          Positioning and Restraints    Pre-Treatment Position sitting in chair/recliner  -     Post Treatment Position chair  -DW     In Chair call light within reach;encouraged to call for assist;exit alarm on  -DW       Row Name 01/18/25 1256          Therapy Assessment/Plan (PT)    PT Diagnosis (PT) Difficulty in walking  -     Rehab Potential (PT) good  -     Criteria for Skilled Interventions Met (PT) yes;meets criteria;skilled treatment is necessary  -     Therapy Frequency (PT) 2 times/day  -     Problem List (PT) problems related to;balance;strength;range of motion (ROM)  -     Activity Limitations Related to Problem List (PT) unable to ambulate safely;unable to transfer safely;BADLs not performed adequately or safely;IADLs not performed adequately or safely  -       Carlos  Name 01/18/25 1256          PT Evaluation Complexity    History, PT Evaluation Complexity no personal factors and/or comorbidities  -DW     Examination of Body Systems (PT Eval Complexity) 1-2 elements  -DW     Clinical Presentation (PT Evaluation Complexity) stable  -DW     Clinical Decision Making (PT Evaluation Complexity) low complexity  -DW     Overall Complexity (PT Evaluation Complexity) low complexity  -DW       Row Name 01/18/25 1256          Therapy Plan Review/Discharge Plan (PT)    Therapy Plan Review (PT) evaluation/treatment results reviewed  -DW       Row Name 01/18/25 1256          Physical Therapy Goals    Gait Training Goal Selection (PT) gait training, PT goal 1  -DW     ROM Goal Selection (PT) ROM, PT goal 1  -DW       Row Name 01/18/25 1256          Gait Training Goal 1 (PT)    Activity/Assistive Device (Gait Training Goal 1, PT) gait (walking locomotion)  -DW     [place] Level (Gait Training Goal 1, PT) independent  -DW     Distance (Gait Training Goal 1, PT) 300  -DW     Time Frame (Gait Training Goal 1, PT) long term goal (LTG);10 days  -DW       Row Name 01/18/25 1256          ROM Goal 1 (PT)    ROM Goal 1 (PT) 5-90  -DW     Time Frame (ROM Goal 1, PT) long-term goal (LTG);10 days  -DW               User Key  (r) = Recorded By, (t) = Taken By, (c) = Cosigned By      Initials Name Provider Type    Rika Gregory, RN Registered Nurse    Berry Sales, [place] Physical Therapist    Danish Cedeño, RN Registered Nurse                    Physical Therapy Education       Title: PT OT SLP Therapies (Done)       Topic: Physical Therapy (Done)       Point: Mobility training (Done)       Learning Progress Summary            Patient Acceptance, E,TB, VU by ORESTES at 1/18/2025 1302                      Point: Home exercise program (Done)       Learning Progress Summary            Patient Acceptance, E,TB, VU by ORESTES at 1/18/2025 1302                      Point: Body mechanics (Done)       Learning  Progress Summary            Patient Acceptance, E,TB, VU by ORESTES at 1/18/2025 1302                      Point: Precautions (Done)       Learning Progress Summary            Patient Acceptance, E,TB, VU by ORESTES at 1/18/2025 1302                                      User Key       Initials Effective Dates Name Provider Type Discipline     04/25/21 -  Berry Gomez, [place] Physical Therapist PT                  PT Recommendation and Plan  Anticipated Discharge Disposition (PT): home with outpatient therapy services  Planned Therapy Interventions (PT): balance training, gait training, strengthening, transfer training  Therapy Frequency (PT): 2 times/day  Plan of Care Reviewed With: patient  Progress: improving  Outcome Evaluation: Pt presents with decreased functional mobility secondary to recent L TKR.  Presence of decreased strength, decreased ROM, and pain contribute to pts dysfunction. Skilled PT intervention is needed to address noted deficits in order to restore to PLOF.   Outcome Measures       Row Name 01/18/25 1302             How much help from another person do you currently need...    Turning from your back to your side while in flat bed without using bedrails? 4  -DW      Moving from lying on back to sitting on the side of a flat bed without bedrails? 3  -DW      Moving to and from a bed to a chair (including a wheelchair)? 3  -DW      Standing up from a chair using your arms (e.g., wheelchair, bedside chair)? 3  -DW      Climbing 3-5 steps with a railing? 3  -DW      To walk in hospital room? 3  -DW      AM-PAC 6 Clicks Score (PT) 19  -DW         Functional Assessment    Outcome Measure Options AM-PAC 6 Clicks Basic Mobility (PT)  -                User Key  (r) = Recorded By, (t) = Taken By, (c) = Cosigned By      Initials Name Provider Type     Berry Gomez, PT Physical Therapist                     Time Calculation:    PT Charges       Row Name 01/18/25 1303             Time Calculation    PT Received  On 01/18/25  -DW      PT Goal Re-Cert Due Date 01/27/25  -DW         Timed Charges    67210 - Gait Training Minutes  8  -DW         Untimed Charges    PT Eval/Re-eval Minutes 15  -DW         Total Minutes    Timed Charges Total Minutes 8  -DW      Untimed Charges Total Minutes 15  -DW       Total Minutes 23  -DW                User Key  (r) = Recorded By, (t) = Taken By, (c) = Cosigned By      Initials Name Provider Type    DW Berry Gomez, PT Physical Therapist                  Therapy Charges for Today       Code Description Service Date Service Provider Modifiers Qty    59139132396 HC GAIT TRAINING EA 15 MIN 1/18/2025 Berry Gomez, PT GP 1    12292366604 HC PT EVAL LOW COMPLEXITY 2 1/18/2025 Berry Gomez, PT GP 1            PT G-Codes  Outcome Measure Options: AM-PAC 6 Clicks Basic Mobility (PT)  AM-PAC 6 Clicks Score (PT): 19  AM-PAC 6 Clicks Score (OT): 21    Berry Gomez PT  1/18/2025

## 2025-01-18 NOTE — THERAPY TREATMENT NOTE
Acute Care - Physical Therapy Treatment Note   E[place]     Patient Name: Carlos Manuel Sanchez  : 1960  MRN: [id number]  Today's Date: 2025   Onset of Illness/Injury or Date of Surgery: 25  Visit Dx:     ICD-10-CM ICD-9-CM   1. Impaired mobility and ADLs  Z74.09 V49.89    Z78.9    2. Osteoarthritis of left knee, unspecified osteoarthritis type  M17.12 715.96   3. Difficulty in walking  R26.2 719.7     Patient Active Problem List   Diagnosis    Hyperlipidemia LDL goal <70    Primary hypertension    Colon cancer screening    Urticaria due to cold    Mass of arm, right    CAD S/P percutaneous coronary angioplasty    Rotator cuff tear, right    Atrial fibrillation, persistent    Type 2 diabetes mellitus with hyperglycemia, without long-term current use of insulin    Preop examination    Acute medial meniscus tear of left knee    OA (osteoarthritis) of knee    History of colon polyps     Past Medical History:   Diagnosis Date    Arthritis     Atrial fibrillation     Borderline diabetic     Colon polyp 10/18/2021    Coronary artery disease     BLOCKAGE 1 STENT PLACED , SEE'S DR WARD, DENIED CP/SOA    Diabetes mellitus 2/15/24    HTN (hypertension)     Hyperlipemia     Rotator cuff syndrome 2022    When pulling back compound bow during hunting season    Rotator cuff tear, right      Past Surgical History:   Procedure Laterality Date    APPENDECTOMY      COLONOSCOPY N/A 10/18/2021    Procedure: COLONOSCOPY with hot snare polypectomies, clips applied x2;  Surgeon: Haroldo Perez MD;  Location: [facility] ENDOSCOPY;  Service: Gastroenterology;  Laterality: N/A;  cecum polyp, transverse polyp    COLONOSCOPY N/A 2024    Procedure: COLONOSCOPY;  Surgeon: Haroldo Perez MD;  Location: [facility] ENDOSCOPY;  Service: Gastroenterology;  Laterality: N/A;  diverticulosis    CORONARY STENT PLACEMENT      1 STENT PLACED    EXCISION LESION Right 2022    Procedure: excision of subcutaneous  mass from right arm;  Surgeon: Jeff Tomas MD;  Location: [facility] MAIN OR;  Service: General;  Laterality: Right;    KNEE ARTHROSCOPY W/ MENISCECTOMY Left 1/3/2024    Procedure: LEFT KNEE ARTHROSCOPY WITH PARTIAL MEDIAL MENISCECTOMY CHONDROPLASTY;  Surgeon: Lisa James MD;  [place] [facility] [place] [facility] [place]  Service: Orthopedics;  Laterality: Left;    ROTATOR CUFF REPAIR Left     SHOULDER ARTHROSCOPY W/ ROTATOR CUFF REPAIR Right 01/25/2023    Procedure: SHOULDER ARTHROSCOPY WITH ROTATOR CUFF REPAIR, SUBCROMIAL DECOMPRESSION,DISTAL CLAVICLE RESECTION;  Surgeon: Lisa James MD;  [place] [facility] [place] [facility] [place]  Service: Orthopedics;  Laterality: Right;    SHOULDER SURGERY  01/25/2023    Right shoulder     PT Assessment (Last 12 Hours)       PT Evaluation and Treatment       Row Name 01/18/25 1305 01/18/25 1256       Physical Therapy Time and Intention    Subjective Information complains of;pain  - complains of;fatigue;pain  -DW    Document Type therapy note (daily note)  - evaluation  -    Mode of Treatment individual therapy;group therapy;physical therapy  - individual therapy;physical therapy  -    Patient Effort good  -SM excellent  -    Symptoms Noted During/After Treatment -- fatigue  -DW    Comment Gait performed individually; therapuetic exercises performed in a group session with 4 participants  - --      Row Name 01/18/25 1305 01/18/25 1256       General Information    Patient Profile Reviewed -- yes  -DW    Onset of Illness/Injury or Date of Surgery -- 01/17/25  -DW    Referring Physician -- Lorraine Bernard  -    Patient Observations alert;cooperative;agree to therapy  - alert  -    Prior Level of Function -- independent:;all household mobility;community mobility;ADL's  -DW    Equipment Currently Used at Home -- none  -DW    Equipment Ordered for Patient -- walker, front wheeled  -DW    Risks Reviewed -- patient:  -DW    Benefits Reviewed -- patient:;improve function;increase  independence;increase strength;increase balance  -DW    Barriers to Rehab -- none identified  -DW      Row Name 01/18/25 1256          Previous Level of Function/Home Environm    BADLs, Previous Functional Level independent  -DW     IADLs, Previous Functional Level independent  -DW     Bed Mobility, Previous Functional Level independent  -DW     Transfers, Previous Functional Level independent  -DW     Household Ambulation, Previous Functional Level independent  -DW     Community Ambulation, Previous Functional Level independent  -DW       Row Name 01/18/25 1256          Living Environment    Current Living Arrangements home  -DW     People in Home spouse  -DW     Primary Care Provided by self  -       Row Name 01/18/25 1256          Home Use of Assistive/Adaptive Equipment    Equipment Currently Used at Home none  -DW       Row Name 01/18/25 1305 01/18/25 1256       Pain    Pretreatment Pain Rating 2/10  -SM 2/10  -DW    Posttreatment Pain Rating 2/10  -SM 2/10  -DW    Pain Location -- knee  -DW    Pain Side/Orientation left  -SM left  -DW      Row Name 01/18/25 1256          Cognition    Orientation Status (Cognition) oriented x 3  -DW     Follows Commands (Cognition) WNL  -DW       Row Name 01/18/25 1256          Range of Motion Comprehensive    General Range of Motion lower extremity range of motion deficits identified  -     Comment, General Range of Motion L knee ROM 10-80 AROM  -       Row Name 01/18/25 1256          Strength Comprehensive (MMT)    General Manual Muscle Testing (MMT) Assessment lower extremity strength deficits identified  -     Comment, General Manual Muscle Testing (MMT) Assessment R LE WFL; L LE 3/5  -       Row Name 01/18/25 1305 01/18/25 1256       Mobility    Extremity Weight-bearing Status left lower extremity  -SM left lower extremity  -DW    Left Lower Extremity (Weight-bearing Status) weight-bearing as tolerated (WBAT)  - weight-bearing as tolerated (WBAT)  -[facility]  Name 01/18/25 1256          Bed Mobility    Bed Mobility other (see comments)  did not assess pt up in chair on arrival  -DW       Row Name 01/18/25 1305 01/18/25 1256       Transfers    Transfers sit-stand transfer;stand-sit transfer;car transfer  - sit-stand transfer;stand-sit transfer  -DW      Row Name 01/18/25 1305 01/18/25 1256       Sit-Stand Transfer    Sit-Stand [place] (Transfers) contact guard  - contact guard  -    Assistive Device (Sit-Stand Transfers) walker, front-wheeled  -SM walker, front-wheeled  -DW      Row Name 01/18/25 1305 01/18/25 1256       Stand-Sit Transfer    Stand-Sit [place] (Transfers) contact guard  - contact guard  -DW    Assistive Device (Stand-Sit Transfers) walker, front-wheeled  -SM walker, front-wheeled  -DW      Row Name 01/18/25 1305          Car Transfer    Type (Car Transfer) sit-stand;stand-sit  -     [place] Level (Car Transfer) contact guard  -     Assistive Device (Car Transfer) leg ;walker, front-wheeled  -SM       Row Name 01/18/25 1305 01/18/25 1256       Gait/Stairs (Locomotion)    Gait/Stairs Locomotion gait/ambulation assistive device  - gait/ambulation independence;gait/ambulation assistive device;distance ambulated  -    [place] Level (Gait) contact guard  - contact guard  -DW    Assistive Device (Gait) walker, front-wheeled  -SM walker, front-wheeled  -DW    Patient was able to Ambulate yes  -SM yes  -DW    Distance in Feet (Gait) 150  x2  -  -DW    Pattern (Gait) 3-point;step-through  - --    Deviations/Abnormal Patterns (Gait) gait speed decreased;stride length decreased;weight shifting decreased  - gait speed decreased;stride length decreased;weight shifting decreased  -DW    Left Sided Gait Deviations foot drop/toe drag;heel strike decreased  - --      Row Name 01/18/25 1305 01/18/25 1256       Safety Issues/Impairments Affecting Functional Mobility    Impairments Affecting Function (Mobility)  balance;pain;range of motion (ROM);strength  - balance;endurance/activity tolerance  -      Row Name 01/18/25 1305 01/18/25 1256       Balance    Balance Assessment standing dynamic balance  - standing dynamic balance  -    Dynamic Standing Balance contact guard  - contact guard;1-person assist  -    Position/Device Used, Standing Balance walker, front-wheeled  - walker, front-wheeled  -DW      Row Name 01/18/25 1305          Motor Skills    Therapeutic Exercise hip;knee;ankle  -       Row Name 01/18/25 1305          Hip (Therapeutic Exercise)    Hip (Therapeutic Exercise) isometric exercises  -     Hip Isometrics (Therapeutic Exercise) left;gluteal sets;10 repetitions;2 sets  -       Row Name 01/18/25 1305          Knee (Therapeutic Exercise)    Knee (Therapeutic Exercise) strengthening exercise;isometric exercises  -     Knee Isometrics (Therapeutic Exercise) left;quad sets;10 repetitions;2 sets  -     Knee Strengthening (Therapeutic Exercise) left;heel slides;SLR (straight leg raise);SAQ (short arc quad);LAQ (long arc quad);10 repetitions;2 sets  -       Row Name 01/18/25 1305          Ankle (Therapeutic Exercise)    Ankle (Therapeutic Exercise) AROM (active range of motion)  -     Ankle AROM (Therapeutic Exercise) left;dorsiflexion;plantarflexion;10 repetitions;2 sets  -       Row Name             Wound 01/17/25 1335 Left anterior knee    Wound - Properties Group Placement Date: 01/17/25  -RK Placement Time: 1335  -RK Side: Left  -RK Orientation: anterior  -RK Location: knee  -RK Primary Wound Type: Incision  -RK    Retired Wound - Properties Group Placement Date: 01/17/25  -RK Placement Time: 1335  -RK Side: Left  -RK Orientation: anterior  -RK Location: knee  -RK Primary Wound Type: Incision  -RK    Retired Wound - Properties Group Placement Date: 01/17/25  -RK Placement Time: 1335  -RK Side: Left  -RK Orientation: anterior  -RK Location: knee  -RK Primary Wound Type: Incision   -RK    Retired Wound - Properties Group Date first assessed: 01/17/25  -RK Time first assessed: 1335  -RK Side: Left  -RK Location: knee  -RK Primary Wound Type: Incision  -RK      Row Name             Wound 01/17/25 1400 Left proximal knee    Wound - Properties Group Placement Date: 01/17/25  -LS Placement Time: 1400  -LS Side: Left  -LS Orientation: proximal  -LS Location: knee  -LS Primary Wound Type: Incision  -LS    Retired Wound - Properties Group Placement Date: 01/17/25  -LS Placement Time: 1400  -LS Side: Left  -LS Orientation: proximal  -LS Location: knee  -LS Primary Wound Type: Incision  -LS    Retired Wound - Properties Group Placement Date: 01/17/25  -LS Placement Time: 1400  -LS Side: Left  -LS Orientation: proximal  -LS Location: knee  -LS Primary Wound Type: Incision  -LS    Retired Wound - Properties Group Date first assessed: 01/17/25  -LS Time first assessed: 1400  -LS Side: Left  -LS Location: knee  -LS Primary Wound Type: Incision  -LS      Row Name 01/18/25 1256          Plan of Care Review    Plan of Care Reviewed With patient  -DW     Progress improving  -     Outcome Evaluation Pt presents with decreased functional mobility secondary to recent L TKR.  Presence of decreased strength, decreased ROM, and pain contribute to pts dysfunction. Skilled PT intervention is needed to address noted deficits in order to restore to PLOF.  -       Row Name 01/18/25 1305 01/18/25 1256       Positioning and Restraints    Pre-Treatment Position sitting in chair/recliner  - sitting in chair/recliner  -    Post Treatment Position chair  - chair  -DW    In Chair reclined;call light within reach;encouraged to call for assist;exit alarm on;L heel elevated;legs elevated  - call light within reach;encouraged to call for assist;exit alarm on  -DW      Row Name 01/18/25 1252          Therapy Assessment/Plan (PT)    PT Diagnosis (PT) Difficulty in walking  -DW     Rehab Potential (PT) good  -DW      Criteria for Skilled Interventions Met (PT) yes;meets criteria;skilled treatment is necessary  -     Therapy Frequency (PT) 2 times/day  -     Problem List (PT) problems related to;balance;strength;range of motion (ROM)  -     Activity Limitations Related to Problem List (PT) unable to ambulate safely;unable to transfer safely;BADLs not performed adequately or safely;IADLs not performed adequately or safely  -[facility] Name 01/18/25 1256          PT Evaluation Complexity    History, PT Evaluation Complexity no personal factors and/or comorbidities  -     Examination of Body Systems (PT Eval Complexity) 1-2 elements  -     Clinical Presentation (PT Evaluation Complexity) stable  -     Clinical Decision Making (PT Evaluation Complexity) low complexity  -     Overall Complexity (PT Evaluation Complexity) low complexity  -[facility] Name 01/18/25 1305          Progress Summary (PT)    Progress Toward Functional Goals (PT) progress toward functional goals is good  -     Daily Progress Summary (PT) Pt is progressing well with their exercise program.  Will continue current plan of care.  -SM       Row Name 01/18/25 1256          Therapy Plan Review/Discharge Plan (PT)    Therapy Plan Review (PT) evaluation/treatment results reviewed  -DW       Row Name 01/18/25 1256          Physical Therapy Goals    Gait Training Goal Selection (PT) gait training, PT goal 1  -     ROM Goal Selection (PT) ROM, PT goal 1  -DW       Row Name 01/18/25 1256          Gait Training Goal 1 (PT)    Activity/Assistive Device (Gait Training Goal 1, PT) gait (walking locomotion)  -     [place] Level (Gait Training Goal 1, PT) independent  -     Distance (Gait Training Goal 1, PT) 300  -DW     Time Frame (Gait Training Goal 1, PT) long term goal (LTG);10 days  -DW       Row Name 01/18/25 1256          ROM Goal 1 (PT)    ROM Goal 1 (PT) 5-90  -DW     Time Frame (ROM Goal 1, PT) long-term goal (LTG);10 days  -                User Key  (r) = Recorded By, (t) = Taken By, (c) = Cosigned By      Initials Name Provider Type    Rika Gregory, RN Registered Nurse    Berry Sales, PT Physical Therapist    Flaca Gentile PTA Physical Therapist Assistant    Dnaish Cedeño RN Registered Nurse                    Physical Therapy Education       Title: PT OT SLP Therapies (Done)       Topic: Physical Therapy (Done)       Point: Mobility training (Done)       Learning Progress Summary            Patient Acceptance, E,TB, VU by  at 1/18/2025 1302                      Point: Home exercise program (Done)       Learning Progress Summary            Patient Acceptance, E,TB, VU by  at 1/18/2025 1302                      Point: Body mechanics (Done)       Learning Progress Summary            Patient Acceptance, E,TB, VU by  at 1/18/2025 1302                      Point: Precautions (Done)       Learning Progress Summary            Patient Acceptance, E,TB, VU by  at 1/18/2025 1302                                      User Key       Initials Effective Dates Name Provider Type Discipline     04/25/21 -  Berry Gomez, PT Physical Therapist PT                  PT Recommendation and Plan     Progress Summary (PT)  Progress Toward Functional Goals (PT): progress toward functional goals is good  Daily Progress Summary (PT): Pt is progressing well with their exercise program.  Will continue current plan of care.   Outcome Measures       Row Name 01/18/25 1307 01/18/25 1302          How much help from another person do you currently need...    Turning from your back to your side while in flat bed without using bedrails? 4  -SM 4  -DW     Moving from lying on back to sitting on the side of a flat bed without bedrails? 3  -SM 3  -DW     Moving to and from a bed to a chair (including a wheelchair)? 3  -SM 3  -DW     Standing up from a chair using your arms (e.g., wheelchair, bedside chair)? 3  -SM 3  -DW     Climbing 3-5 steps with a  railing? 3  -SM 3  -DW     To walk in hospital room? 3  -SM 3  -DW     AM-PAC 6 Clicks Score (PT) 19  -SM 19  -DW        Functional Assessment    Outcome Measure Options -- AM-PAC 6 Clicks Basic Mobility (PT)  -DW               User Key  (r) = Recorded By, (t) = Taken By, (c) = Cosigned By      Initials Name Provider Type    Berry Sales, PT Physical Therapist    Flaca Gentile PTA Physical Therapist Assistant                     Time Calculation:    PT Charges       Row Name 01/18/25 1304 01/18/25 1303          Time Calculation    PT Received On 01/18/25  -SM 01/18/25  -DW     PT Goal Re-Cert Due Date -- 01/27/25  -DW        Timed Charges    87552 - Gait Training Minutes  13  -SM 8  -DW     93696 - PT Therapeutic Activity Minutes 10  -SM --        Untimed Charges    PT Eval/Re-eval Minutes -- 15  -DW     PT Group Therapy Minutes 20  -SM --        Total Minutes    Timed Charges Total Minutes 23  -SM 8  -DW     Untimed Charges Total Minutes 20  -SM 15  -DW      Total Minutes 43  -SM 23  -DW               User Key  (r) = Recorded By, (t) = Taken By, (c) = Cosigned By      Initials Name Provider Type    Berry Sales, PT Physical Therapist    Flaca Gentile PTA Physical Therapist Assistant                      PT G-Codes  Outcome Measure Options: AM-PAC 6 Clicks Basic Mobility (PT)  AM-PAC 6 Clicks Score (PT): 19  AM-PAC 6 Clicks Score (OT): 21    Flaca Hargrove PTA  1/18/2025

## 2025-01-18 NOTE — PROGRESS NOTES
[facility]   Hospitalist Progress Note  Date: 2025  Patient Name: Carlos Manuel Sanchez  : 1960  MRN: [id number]  Date of admission: 2025  Room/Bed: [facility]/      Subjective   Subjective     Chief Complaint: Osteoarthritis    Summary:Carlos Manuel Sanchez is a 64 y.o. male with past medical history of A-fib on Eliquis, prediabetes, hypertension, hyperlipidemia. He presents today for elective left knee replacement. No immediate postoperative complications. Patient was resting comfortably in the bedside chair, denies chest pain, shortness breath, or abdominal pain.     Interval Followup: No acute overnight events.  No acute distress.  Patient resting comfortably in bed.  He reports that he did well with PT.  Still has some numbness from his block yesterday but reports in the last hour this has went away.  He is ready go home today.        Objective   Objective     Vitals:   Temp:  [96.9 °F (36.1 °C)-98.8 °F (37.1 °C)] 97.7 °F (36.5 °C)  Heart Rate:  [48-73] 64  Resp:  [12-20] 16  BP: ()/(50-76) 158/76  Flow (L/min) (Oxygen Therapy):  [2-4] 2    Physical Exam   Gen: NAD, Alert and Oriented  Pulm: Room air, no respiratory distress  Abd: soft, nondistended  Extremities: no pitting edema    Result Review    Result Review:  I have personally reviewed these results:  [x]  Laboratory      Lab 25   WBC 12.15*   HEMOGLOBIN 12.2*   HEMATOCRIT 36.7*   PLATELETS 153   MCV 91.1         Lab 25  0417 25  0853   SODIUM 128* 131*   POTASSIUM 5.0 4.4   CHLORIDE 96* 98   CO2 23.2 22.6   ANION GAP 8.8 10.4   BUN 19 15   CREATININE 1.07 0.90   EGFR 78.0 96.0   GLUCOSE 210* 135*   CALCIUM 8.2* 9.3         Lab 25  0853   TOTAL PROTEIN 7.6   ALBUMIN 4.2   GLOBULIN 3.4   ALT (SGPT) 48*   AST (SGOT) 35   BILIRUBIN 0.6   ALK PHOS 95                     Brief Urine Lab Results       None          [x]  Microbiology   Microbiology Results (last 10 days)       ** No results found for the last 240  hours. **          [x]  Radiology  XR Knee 1 or 2 View Left    Result Date: 1/17/2025  Impression: Status post left total knee arthroplasty. Hardware in expected position expected postoperative changes. Electronically Signed: Martínez Serna MD  1/17/2025 3:28 PM EST  Workstation ID: [id number]   []  EKG/Telemetry   []  Cardiology/Vascular   []  Pathology  []  Old records  []  Other:    Assessment & Plan   Assessment / Plan     Assessment:  Osteoarthritis  Left knee replacement  A-fib on Eliquis  Hypertension  Hyperlipidemia  Prediabetes        Plan:   Outpatient in a bed  Multimodal pain control  Bowel regiment  Ortho following  PT/OT  Postsurgical labs with hemoglobin 12.2 and sodium 128.  Asked him to follow-up with his PCP for repeat labs in 1 to 2 weeks.  Home meds reviewed.  Resume home amiodarone.  Okay to DC home from IM standpoint     Discussed with RN.    VTE Prophylaxis:  Pharmacologic & mechanical VTE prophylaxis orders are present.        CODE STATUS:   Code Status (Patient has no pulse and is not breathing): CPR (Attempt to Resuscitate)  Medical Interventions (Patient has pulse or is breathing): Full Support      Electronically signed by Lorraine Bernard DO, 1/18/2025, 13:04 EST.

## 2025-01-18 NOTE — SIGNIFICANT NOTE
01/18/25 0904   Plan   Final Discharge Disposition Code 01 - home or self-care   Final Note [facility]. Appt: 01/20/25 at 8AM.

## 2025-01-18 NOTE — PROGRESS NOTES
Orthopedic Total Knee Progress Note    Assessment/Plan PT/OT, block slowly resolving, outpatient therapy and [place] at [facility], follow-up 2 weeks, DVT prophylaxis    Status post-left total knee arthroplasty: Doing well postoperatively.    Pain Relief: some relief    Continues current post-op course    Activity: up with assistance    Weight Bearing: WBAT     LOS: 0 days     Subjective     Post-Operative Day: 1 post-left total knee arthroplasty  Systemic or Specific Complaints: No Complaints    Objective     Vital signs in last 24 hours:  Vitals:    01/17/25 2350 01/18/25 0245 01/18/25 0421 01/18/25 0737   BP: 114/66  125/76 135/73   BP Location: Left arm  Left arm Left arm   Patient Position: Sitting  Sitting Sitting   Pulse: 52 67 55 55   Resp: 14  16 16   Temp: 97.3 °F (36.3 °C)  97.7 °F (36.5 °C) 98.1 °F (36.7 °C)   TempSrc: Oral  Oral Oral   SpO2: 97% 94% 96% 96%   Weight:       Height:            General: alert, appears stated age, and cooperative   Neurovascular: Tibial nerve: Intact, Superficial peroneal nerve: Intact, and Deep peroneal nerve: Intact nerve block is slowly resolving  Capillary refill: Normal   Wound: Wound clean and dry no evidence of infection.   Range of Motion: Limited flexsion and Limited extension   DVT Exam: No evidence of DVT seen on physical exam.      WBC   Date Value Ref Range Status   01/18/2025 12.15 (H) 3.40 - 10.80 10*3/mm3 Final     RBC   Date Value Ref Range Status   01/18/2025 4.03 (L) 4.14 - 5.80 10*6/mm3 Final     Hemoglobin   Date Value Ref Range Status   01/18/2025 12.2 (L) 13.0 - 17.7 g/dL Final     Hematocrit   Date Value Ref Range Status   01/18/2025 36.7 (L) 37.5 - 51.0 % Final     MCV   Date Value Ref Range Status   01/18/2025 91.1 79.0 - 97.0 fL Final     MCH   Date Value Ref Range Status   01/18/2025 30.3 26.6 - 33.0 pg Final     MCHC   Date Value Ref Range Status   01/18/2025 33.2 31.5 - 35.7 g/dL Final     RDW   Date Value Ref Range Status   01/18/2025 11.8  "(L) 12.3 - 15.4 % Final     RDW-SD   Date Value Ref Range Status   01/18/2025 38.9 37.0 - 54.0 fl Final     MPV   Date Value Ref Range Status   01/18/2025 9.1 6.0 - 12.0 fL Final     Platelets   Date Value Ref Range Status   01/18/2025 153 140 - 450 10*3/mm3 Final        Basic Metabolic Panel    Sodium Sodium   Date Value Ref Range Status   01/18/2025 128 (L) 136 - 145 mmol/L Final      Potassium Potassium   Date Value Ref Range Status   01/18/2025 5.0 3.5 - 5.2 mmol/L Final      Chloride Chloride   Date Value Ref Range Status   01/18/2025 96 (L) 98 - 107 mmol/L Final      Bicarbonate No results found for: \"PLASMABICARB\"   BUN BUN   Date Value Ref Range Status   01/18/2025 19 8 - 23 mg/dL Final      Creatinine Creatinine   Date Value Ref Range Status   01/18/2025 1.07 0.76 - 1.27 mg/dL Final      Calcium Calcium   Date Value Ref Range Status   01/18/2025 8.2 (L) 8.6 - 10.5 mg/dL Final      Glucose      No components found for: \"GLUCOSE.*\"      XR Knee 1 or 2 View Left   Final Result   Impression:   Status post left total knee arthroplasty. Hardware in expected position expected postoperative changes.         Electronically Signed: Martínez Serna MD     1/17/2025 3:28 PM EST     Workstation ID: [id number]           "

## 2025-01-18 NOTE — PLAN OF CARE
Goal Outcome Evaluation:  Plan of Care Reviewed With: patient        Progress: improving  Outcome Evaluation: pt aa0x4, calls for assistance. pt medicated with scheduled and prn pain medications for left knee pain with voiced relief. pt up to bathroom with 1 assist/gait belt/walker, voiding without difficulty. encouraged ankle rom, use of incentive spirometer every 1 hr while awake. no changes in pt's status.

## 2025-01-18 NOTE — THERAPY EVALUATION
Patient Name: Carlos Manuel Sanchez  : 1960    MRN: [id number]                              Today's Date: 2025       Admit Date: 2025    Visit Dx:     ICD-10-CM ICD-9-CM   1. Impaired mobility and ADLs  Z74.09 V49.89    Z78.9    2. Osteoarthritis of left knee, unspecified osteoarthritis type  M17.12 715.96     Patient Active Problem List   Diagnosis    Hyperlipidemia LDL goal <70    Primary hypertension    Colon cancer screening    Urticaria due to cold    Mass of arm, right    CAD S/P percutaneous coronary angioplasty    Rotator cuff tear, right    Atrial fibrillation, persistent    Type 2 diabetes mellitus with hyperglycemia, without long-term current use of insulin    Preop examination    Acute medial meniscus tear of left knee    OA (osteoarthritis) of knee    History of colon polyps     Past Medical History:   Diagnosis Date    Arthritis     Atrial fibrillation     Borderline diabetic     Colon polyp 10/18/2021    Coronary artery disease     BLOCKAGE 1 STENT PLACED , SEE'S DR WARD, DENIED CP/SOA    Diabetes mellitus 2/15/24    HTN (hypertension)     Hyperlipemia     Rotator cuff syndrome 2022    When pulling back compound bow during hunting season    Rotator cuff tear, right      Past Surgical History:   Procedure Laterality Date    APPENDECTOMY      COLONOSCOPY N/A 10/18/2021    Procedure: COLONOSCOPY with hot snare polypectomies, clips applied x2;  Surgeon: Haroldo Perez MD;  Location: [facility] ENDOSCOPY;  Service: Gastroenterology;  Laterality: N/A;  cecum polyp, transverse polyp    COLONOSCOPY N/A 2024    Procedure: COLONOSCOPY;  Surgeon: Haroldo Perez MD;  Location: [facility] ENDOSCOPY;  Service: Gastroenterology;  Laterality: N/A;  diverticulosis    CORONARY STENT PLACEMENT      1 STENT PLACED    EXCISION LESION Right 2022    Procedure: excision of subcutaneous mass from right arm;  Surgeon: Jeff Tomas MD;  Location: [facility] MAIN OR;  Service:  General;  Laterality: Right;    KNEE ARTHROSCOPY W/ MENISCECTOMY Left 1/3/2024    Procedure: LEFT KNEE ARTHROSCOPY WITH PARTIAL MEDIAL MENISCECTOMY CHONDROPLASTY;  Surgeon: Lisa James MD;  Location: [facility];  Service: Orthopedics;  Laterality: Left;    ROTATOR CUFF REPAIR Left     SHOULDER ARTHROSCOPY W/ ROTATOR CUFF REPAIR Right 01/25/2023    Procedure: SHOULDER ARTHROSCOPY WITH ROTATOR CUFF REPAIR, SUBCROMIAL DECOMPRESSION,DISTAL CLAVICLE RESECTION;  Surgeon: Lisa James MD;  Location: [facility];  Service: Orthopedics;  Laterality: Right;    SHOULDER SURGERY  01/25/2023    Right shoulder      General Information       Row Name 01/18/25 0833 01/18/25 0817       OT Time and Intention    Document Type therapy note (daily note)  -AC evaluation  -AC    Mode of Treatment individual therapy;occupational therapy  -AC individual therapy;occupational therapy  -AC    Patient Effort good  -AC good  -AC      Row Name 01/18/25 0833 01/18/25 0817       General Information    Patient Profile Reviewed yes  -AC yes  -AC    Prior Level of Function -- --  Patient reports independent with ADLs and functional mobility without an assistive device.  He states he has a walk-in shower with seat option and a regular toilet in place.  -AC    Existing Precautions/Restrictions fall;weight bearing  -AC fall;weight bearing  -AC    Barriers to Rehab -- none identified  -AC      Row Name 01/18/25 0817          Occupational Profile    Reason for Services/Referral (Occupational Profile) Pt. is a 64year old male admitted for the above diagnosis. Pt. referred to OT services to assess independence with ADLs and adl transfers/fx'l mobility. No previous OT services for current condition.  -AC       Row Name 01/18/25 0833 01/18/25 0817       Living Environment    People in Home spouse  -AC spouse  -AC      Row Name 01/18/25 0833 01/18/25 0817       Cognition    Orientation Status (Cognition) oriented x 3  -AC oriented x 3  -AC       Row Name 01/18/25 0833 01/18/25 0817       Safety Issues/Impairments Affecting Functional Mobility    Impairments Affecting Function (Mobility) balance;endurance/activity tolerance  -AC balance;endurance/activity tolerance  -AC              User Key  (r) = Recorded By, (t) = Taken By, (c) = Cosigned By      Initials Name Provider Type    AC Svetlana Betancourt OT Occupational Therapist                     Mobility/ADL's       Row Name 01/18/25 0834 01/18/25 0818       Bed Mobility    Comment, (Bed Mobility) Patient seated in recliner upon OT arrival in the room.  -AC Patient seated in recliner upon OT arrival in the room.  Agreeable to OT.  -AC      Row Name 01/18/25 0834 01/18/25 0818       Transfers    Transfers sit-stand transfer  -AC sit-stand transfer  -AC      Row Name 01/18/25 0834 01/18/25 0818       Sit-Stand Transfer    Sit-Stand [place] (Transfers) contact guard;1 person assist;verbal cues  -AC contact guard;1 person assist  -AC    Assistive Device (Sit-Stand Transfers) -- walker, front-wheeled  -AC    Comment, (Sit-Stand Transfer) patient was CGA with rolling walker for sit to/from stand x 3 with cues for safety and technique.  -AC --      Row Name 01/18/25 0834 01/18/25 0818       Functional Mobility    Functional Mobility- Ind. Level contact guard assist;1 person;verbal cues required  -AC contact guard assist;1 person  -AC    Functional Mobility- Device walker, front-wheeled  -AC walker, front-wheeled  -AC    Functional Mobility- Comment patient is CGA with rolling walker for functional mobility in the room with cues for safety and technique. patient compensating well for LLE leg number/drop foot from surgery.  -AC --      Row Name 01/18/25 0834 01/18/25 0818       Activities of Daily Living    BADL Assessment/Intervention upper body dressing;lower body dressing  -AC --  Patient is set up for self-feeding, set up for grooming, set up for upper body bathing/dressing, min assist for lower body  bathing/dressing, contact-guard assist for toileting  -AC      Row Name 01/18/25 0834          Mobility    Extremity Weight-bearing Status left lower extremity  -AC     Left Lower Extremity (Weight-bearing Status) weight-bearing as tolerated (WBAT)  -AC       Row Name 01/18/25 0834          Upper Body Dressing Assessment/Training    [place] Level (Upper Body Dressing) upper body dressing skills;doff;don;pull-over garment;set up  -AC     Position (Upper Body Dressing) unsupported sitting  -AC       Row Name 01/18/25 0834          Lower Body Dressing Assessment/Training    [place] Level (Lower Body Dressing) lower body dressing skills;doff;don;pants/bottoms;shoes/slippers;socks;verbal cues;minimum assist (75% patient effort)  -AC     Position (Lower Body Dressing) supported standing;unsupported sitting  -               User Key  (r) = Recorded By, (t) = Taken By, (c) = Cosigned By      Initials Name Provider Type     Svetlana Betancourt OT Occupational Therapist                   Obj/Interventions       Row Name 01/18/25 08 01/18/25 0819       Sensory Assessment (Somatosensory)    Sensory Assessment (Somatosensory) UE sensation intact  - UE sensation intact  -AC      Row Name 01/18/25 0839 01/18/25 0819       Vision Assessment/Intervention    Visual Impairment/Limitations WNL  -AC WNL  -AC      Row Name 01/18/25 0839 01/18/25 0819       Range of Motion Comprehensive    General Range of Motion bilateral upper extremity ROM WNL  -AC bilateral upper extremity ROM WNL  -AC      Row Name 01/18/25 0839 01/18/25 0819       Strength Comprehensive (MMT)    General Manual Muscle Testing (MMT) Assessment no strength deficits identified  - no strength deficits identified  -AC      Row Name 01/18/25 0839 01/18/25 0819       Motor Skills    Motor Skills coordination;functional endurance  -AC coordination;functional endurance  -AC    Coordination WFL  -AC WFL  -AC    Functional Endurance f  -AC Fair for ADLs  -AC       Row Name 01/18/25 0839 01/18/25 0819       Balance    Balance Assessment standing dynamic balance  -AC standing dynamic balance  -AC    Dynamic Standing Balance contact guard;1-person assist;verbal cues  -AC contact guard;1-person assist  -AC    Position/Device Used, Standing Balance supported;walker, rolling  -AC supported;walker, rolling  -AC    Balance Interventions standing;sit to stand;supported;dynamic;minimal challenge;occupation based/functional task  -AC --              User Key  (r) = Recorded By, (t) = Taken By, (c) = Cosigned By      Initials Name Provider Type    AC Svetlana Betancourt OT Occupational Therapist                   Goals/Plan       Row Name 01/18/25 0828          Bed Mobility Goal 1 (OT)    Activity/Assistive Device (Bed Mobility Goal 1, OT) bed mobility activities, all  -AC     [place] Level/Cues Needed (Bed Mobility Goal 1, OT) modified independence  -AC     Time Frame (Bed Mobility Goal 1, OT) long term goal (LTG);10 days  -       Row Name 01/18/25 0828          Transfer Goal 1 (OT)    Activity/Assistive Device (Transfer Goal 1, OT) transfers, all  -AC     [place] Level/Cues Needed (Transfer Goal 1, OT) modified independence  -AC     Time Frame (Transfer Goal 1, OT) long term goal (LTG);10 days  -       Row Name 01/18/25 0828          Bathing Goal 1 (OT)    Activity/Device (Bathing Goal 1, OT) bathing skills, all  -AC     [place] Level/Cues Needed (Bathing Goal 1, OT) modified independence  -AC     Time Frame (Bathing Goal 1, OT) long term goal (LTG);10 days  -       Row Name 01/18/25 0828          Dressing Goal 1 (OT)    Activity/Device (Dressing Goal 1, OT) dressing skills, all  -AC     [place]/Cues Needed (Dressing Goal 1, OT) modified independence  -AC     Time Frame (Dressing Goal 1, OT) long term goal (LTG);10 days  -       Row Name 01/18/25 0828          Toileting Goal 1 (OT)    Activity/Device (Toileting Goal 1, OT) toileting skills, all  -AC      [place] Level/Cues Needed (Toileting Goal 1, OT) modified independence  -AC     Time Frame (Toileting Goal 1, OT) long term goal (LTG);10 days  -AC       Row Name 01/18/25 0828          Problem Specific Goal 1 (OT)    Problem Specific Goal 1 (OT) Patient will demonstrate good activity tolerance for adls.  -AC     Time Frame (Problem Specific Goal 1, OT) long term goal (LTG);10 days  -AC       Row Name 01/18/25 0828          Therapy Assessment/Plan (OT)    Planned Therapy Interventions (OT) activity tolerance training;functional balance retraining;occupation/activity based interventions;ROM/therapeutic exercise;transfer/mobility retraining;patient/caregiver education/training;BADL retraining  -AC               User Key  (r) = Recorded By, (t) = Taken By, (c) = Cosigned By      Initials Name Provider Type    Svetlana Aguilar, FREEDOM Occupational Therapist                   Clinical Impression       Row Name 01/18/25 0839 01/18/25 0819       Pain Assessment    Pretreatment Pain Rating 2/10  -AC 2/10  -AC    Posttreatment Pain Rating 2/10  -AC 2/10  -AC    Pain Location knee  -AC knee  -AC    Pain Side/Orientation left  -AC left  -AC      Row Name 01/18/25 0839 01/18/25 0819       Plan of Care Review    Plan of Care Reviewed With patient  -AC patient  -AC    Progress no change  -AC no change  -AC    Outcome Evaluation Patient instructed in lower body adptive dressing technique, weightbearing status, joint protection and safety with adl transfers. Patient to continue with therapy services in order to maximize independence with adls.  -AC Patient presents with balance and endurance limitations that impede his/her ability to perform ADLS. The skills of a therapist are necessary to maximize independence with ADLs.  -AC      Row Name 01/18/25 0819          Therapy Assessment/Plan (OT)    Patient/Family Therapy Goal Statement (OT) Less pain.  -AC     Rehab Potential (OT) good  -AC     Criteria for Skilled Therapeutic  Interventions Met (OT) yes;meets criteria;skilled treatment is necessary  -AC     Therapy Frequency (OT) 5 times/wk  -       Row Name 01/18/25 0819          Therapy Plan Review/Discharge Plan (OT)    Equipment Needs Upon Discharge (OT) walker, rolling;commode chair  -AC     Anticipated Discharge Disposition (OT) home with assist;home with outpatient therapy services  -       Row Name 01/18/25 0819          Positioning and Restraints    Pre-Treatment Position sitting in chair/recliner  -     Post Treatment Position chair  -AC     In Chair reclined;call light within reach;encouraged to call for assist;exit alarm on;legs elevated  -               User Key  (r) = Recorded By, (t) = Taken By, (c) = Cosigned By      Initials Name Provider Type     Svetlana Betancourt, FREEDOM Occupational Therapist                   Outcome Measures       Row Name 01/18/25 0830          How much help from another is currently needed...    Putting on and taking off regular lower body clothing? 3  -AC     Bathing (including washing, rinsing, and drying) 3  -AC     Toileting (which includes using toilet bed pan or urinal) 3  -AC     Putting on and taking off regular upper body clothing 4  -AC     Taking care of personal grooming (such as brushing teeth) 4  -AC     Eating meals 4  -AC     AM-PAC 6 Clicks Score (OT) 21  -AC       Row Name 01/17/25 2203          How much help from another person do you currently need...    Turning from your back to your side while in flat bed without using bedrails? 3  -EV     Moving from lying on back to sitting on the side of a flat bed without bedrails? 3  -EV     Moving to and from a bed to a chair (including a wheelchair)? 3  -EV     Standing up from a chair using your arms (e.g., wheelchair, bedside chair)? 3  -EV     Climbing 3-5 steps with a railing? 3  -EV     To walk in hospital room? 3  -EV     AM-PAC 6 Clicks Score (PT) 18  -EV       Row Name 01/18/25 0830          Functional Assessment    Outcome  Measure Options AM-PAC 6 Clicks Daily Activity (OT);Optimal Instrument  -AC       Row Name 01/18/25 0830          Optimal Instrument    Optimal Instrument Optimal - 3  -AC     Bending/Stooping 2  -AC     Standing 2  -AC     Reaching 1  -AC     From the list, choose the 3 activities you would most like to be able to do without any difficulty Bending/stooping;Standing;Reaching  -AC     Total Score Optimal - 3 5  -AC               User Key  (r) = Recorded By, (t) = Taken By, (c) = Cosigned By      Initials Name Provider Type    Dee Dee Whitehead, RN Registered Nurse    Svetlana Aguilar OT Occupational Therapist                    Occupational Therapy Education       Title: PT OT SLP Therapies (Done)       Topic: Occupational Therapy (Done)       Point: ADL training (Done)       Description:   Instruct learner(s) on proper safety adaptation and remediation techniques during self care or transfers.   Instruct in proper use of assistive devices.                  Learning Progress Summary            Patient Acceptance, E,TB,D, VU,DU by  at 1/18/2025 0830                      Point: Home exercise program (Done)       Description:   Instruct learner(s) on appropriate technique for monitoring, assisting and/or progressing therapeutic exercises/activities.                  Learning Progress Summary            Patient Acceptance, E,TB,D, VU,DU by  at 1/18/2025 0830                      Point: Precautions (Done)       Description:   Instruct learner(s) on prescribed precautions during self-care and functional transfers.                  Learning Progress Summary            Patient Acceptance, E,TB,D, VU,DU by  at 1/18/2025 0830                      Point: Body mechanics (Done)       Description:   Instruct learner(s) on proper positioning and spine alignment during self-care, functional mobility activities and/or exercises.                  Learning Progress Summary            Patient Acceptance, E,TB,D, VU,DU by  at  1/18/2025 0830                                      User Key       Initials Effective Dates Name Provider Type Discipline     06/16/21 -  Svetlana Betancourt OT Occupational Therapist OT                  OT Recommendation and Plan  Planned Therapy Interventions (OT): activity tolerance training, functional balance retraining, occupation/activity based interventions, ROM/therapeutic exercise, transfer/mobility retraining, patient/caregiver education/training, BADL retraining  Therapy Frequency (OT): 5 times/wk  Plan of Care Review  Plan of Care Reviewed With: patient  Progress: no change  Outcome Evaluation: Patient instructed in lower body adptive dressing technique, weightbearing status, joint protection and safety with adl transfers. Patient to continue with therapy services in order to maximize independence with adls.     Time Calculation:   Evaluation Complexity (OT)  Review Occupational Profile/Medical/Therapy History Complexity: expanded/moderate complexity  Assessment, Occupational Performance/Identification of Deficit Complexity: 1-3 performance deficits  Clinical Decision Making Complexity (OT): problem focused assessment/low complexity  Overall Complexity of Evaluation (OT): low complexity     Time Calculation- OT       Row Name 01/18/25 0832             Time Calculation- OT    OT Received On 01/18/25  -AC      OT Goal Re-Cert Due Date 01/27/25  -AC         Timed Charges    95350 - OT Self Care/Mgmt Minutes 10  -AC         Untimed Charges    OT Eval/Re-eval Minutes 31  -AC         Total Minutes    Timed Charges Total Minutes 10  -AC      Untimed Charges Total Minutes 31  -AC       Total Minutes 41  -AC                User Key  (r) = Recorded By, (t) = Taken By, (c) = Cosigned By      Initials Name Provider Type    AC Svetlana Betancourt OT Occupational Therapist                  Therapy Charges for Today       Code Description Service Date Service Provider Modifiers Qty    [id number]  OT SELF CARE/MGMT/TRAIN EA  15 MIN 1/18/2025 Svetlana Betancourt, OT GO 1    [id number] HC OT EVAL LOW COMPLEXITY 3 1/18/2025 Svetlana Betancourt OT GO 1                 Svetlana Betancourt OT  1/18/2025

## 2025-01-18 NOTE — PLAN OF CARE
Goal Outcome Evaluation:  Plan of Care Reviewed With: patient        Progress: improving  Outcome Evaluation: Patient A/Ox4, pain managed with scheduled meds, Ambulated with 1 assist, walker, and gait belt approximately 280 ft total with therapy today. Voiding well. Discharge instructions and surgical incision care instructions reviewed with patient and patient spouse and they verbalized understanding. Patient spouse picked up meds from pharmacy for patient and DME is in patient room. Outpatient PT has been scheduled. Spouse is present and will be transporting home via personal vehicle.

## 2025-01-18 NOTE — PLAN OF CARE
Goal Outcome Evaluation:  Plan of Care Reviewed With: patient        Progress: no change  Outcome Evaluation: Patient presents with balance and endurance limitations that impede his/her ability to perform ADLS. The skills of a therapist are necessary to maximize independence with ADLs.    Anticipated Discharge Disposition (OT): home with assist, home with outpatient therapy services

## 2025-01-23 DIAGNOSIS — Z74.09 IMPAIRED MOBILITY AND ADLS: ICD-10-CM

## 2025-01-23 DIAGNOSIS — Z78.9 IMPAIRED MOBILITY AND ADLS: ICD-10-CM

## 2025-01-23 RX ORDER — HYDROCODONE BITARTRATE AND ACETAMINOPHEN 7.5; 325 MG/1; MG/1
1 TABLET ORAL EVERY 4 HOURS PRN
Qty: 42 TABLET | Refills: 0 | Status: SHIPPED | OUTPATIENT
Start: 2025-01-23

## 2025-01-30 NOTE — PROGRESS NOTES
Chief Complaint  Follow-up of the Left Knee    Subjective          History of Present Illness     Carlos Manuel Sanchez is a 64 y.o. male presents today for a follow up of left knee. Patient is 2 weeks post op left total knee arthroplasty performed by Dr. James on 1/17/2025.  Patient presents today without use of assistive devices.  Patient states that overall he thinks the knee is doing okay.  It is tight which causes pain especially with flexion.  At night it aches.  He is primarily only taken the pain medication at night to sleep, if at all.  He is going to physical therapy at [facility] and states that is going well.  He takes Eliquis at baseline and so he is unable to take NSAIDs.  Patient is a borderline diabetic and has altered his diet to decrease his A1c.    Patient denies redness, drainage, or complications from incision. Denies fever or chills.     No Known Allergies     Social History     Socioeconomic History    Marital status:    Tobacco Use    Smoking status: Never     Passive exposure: Past    Smokeless tobacco: Never    Tobacco comments:     Never use   Vaping Use    Vaping status: Never Used   Substance and Sexual Activity    Alcohol use: Yes     Alcohol/week: 10.0 standard drinks of alcohol     Types: 10 Cans of beer per week     Comment: Social    Drug use: Never    Sexual activity: Defer     Partners: Female     Birth control/protection: Vasectomy        Tobacco Use: Low Risk  (1/31/2025)    Patient History     Smoking Tobacco Use: Never     Smokeless Tobacco Use: Never     Passive Exposure: Past     Patient reports that they are a nonsmoker; cessation education not applicable.     I reviewed the patient's chief complaint, history of present illness, review of systems, past medical history, surgical history, family history, social history, medications, and allergy list.     REVIEW OF SYSTEMS    Constitutional: Denies fevers, chills, weight loss  Cardiovascular: Denies chest pain,  "shortness of breath  Skin: Denies rashes, acute skin changes  Neurologic: Denies headache, loss of consciousness  MSK: Left knee pain      Objective   Vital Signs:   /72   Pulse 71   Ht 177.8 cm (70\")   Wt 94.3 kg (208 lb)   SpO2 94%   BMI 29.84 kg/m²     Body mass index is 29.84 kg/m².    Physical Exam    General: Alert, no acute distress  Gait: Slight limp without use of assistive devices  Left lower extremity: Staples and sutures removed today without complications.  Anterior knee incision is healing appropriately.  2 distal incisions are well-healing.  No active drainage or redness noted.  No concerning signs for infection.  Moderate knee effusion. Knee extensor mechanism intact. Hip flexion intact.  90 degrees flexion.  Full. Knee stable to varus and valgus stress. Calf soft, nontender. Demonstrates active ankle plantarflexion and dorsiflexion. Sensation intact over the dorsal and plantar foot. Palpable pedal pulses.         Imaging Results (Most Recent)       Procedure Component Value Units Date/Time    XR Knee 3 View Left [[id number]] Resulted: 01/31/25 1031     Updated: 01/31/25 1031    Narrative:      X-Ray Report:  Study: X-rays ordered, taken in the office, and reviewed today.   Site: Left knee Xray  Indication: Left total knee arthroplasty follow up.   View: AP/Lateral, Standing, and [place] view(s)  Findings: Intact left total knee arthroplasty. No evidence of hardware   malfunction, complication or loosening. No periprosthetic fractures   visualized. Patella is midline tracking on sunrise view.   Prior studies available for comparison: yes                         Assessment and Plan    Diagnoses and all orders for this visit:    1. S/P total knee arthroplasty, left (Primary)  -     XR Knee 3 View Left    2. Left knee pain, unspecified chronicity      Patient voices some concerns with the flexion.  Advise that it is normal at this point to only be able to flex to 90.  If the swelling persist, " we could consider Medrol Dosepak if it is severely limiting his range of motion progression at next visit.  He is unable to take NSAIDs secondary to his Eliquis.    Follow-up 4 weeks without x-rays.  Call or return if symptoms worsen or patient has any concerns.       Follow Up   No follow-ups on file.  Patient Instructions   X-rays taken and reviewed, showing intact hardware.    Staples removed in office and Steri-Strips applied. Patient educated on incision care. You may shower but do not soak or submerge in water until incision is fully healed.  Do not apply creams or lotions over the incision. Please allow Steri-Strips to fall off on their own within 7 to 10 days.    Continue icing and elevation of the knee as needed to help with pain and swelling.  You may ice knee for approximately 15-20 minutes, three times daily, and as needed.   Continue PT to progress ROM, strength, and weightbearing status. Important to do home exercises in addition to PT.     We discussed the importance of weaning from narcotic medications. Call for refills.   Patient will continue Eliquis he takes at baseline.      Follow-up in 4 weeks. Repeat x-rays not needed at this visit.  Please call with questions or concerns.   Patient was given instructions and counseling regarding his condition or for health maintenance advice. Please see specific information pulled into the AVS if appropriate.     Svetlana Banks PA-C  01/31/25  10:37 EST

## 2025-01-30 NOTE — PATIENT INSTRUCTIONS
X-rays taken and reviewed, showing intact hardware.    Staples removed in office and Steri-Strips applied. Patient educated on incision care. You may shower but do not soak or submerge in water until incision is fully healed.  Do not apply creams or lotions over the incision. Please allow Steri-Strips to fall off on their own within 7 to 10 days.    Continue icing and elevation of the knee as needed to help with pain and swelling.  You may ice knee for approximately 15-20 minutes, three times daily, and as needed.   Continue PT to progress ROM, strength, and weightbearing status. Important to do home exercises in addition to PT.     We discussed the importance of weaning from narcotic medications. Call for refills.   Patient will continue Eliquis he takes at baseline.      Follow-up in 4 weeks. Repeat x-rays not needed at this visit.  Please call with questions or concerns.

## 2025-01-31 ENCOUNTER — OFFICE VISIT (OUTPATIENT)
Dept: ORTHOPEDIC SURGERY | Facility: CLINIC | Age: 65
End: 2025-01-31
Payer: OTHER GOVERNMENT

## 2025-01-31 VITALS
OXYGEN SATURATION: 94 % | DIASTOLIC BLOOD PRESSURE: 72 MMHG | BODY MASS INDEX: 29.78 KG/M2 | SYSTOLIC BLOOD PRESSURE: 112 MMHG | HEART RATE: 71 BPM | WEIGHT: 208 LBS | HEIGHT: 70 IN

## 2025-01-31 DIAGNOSIS — Z96.652 S/P TOTAL KNEE ARTHROPLASTY, LEFT: Primary | ICD-10-CM

## 2025-01-31 DIAGNOSIS — M25.562 LEFT KNEE PAIN, UNSPECIFIED CHRONICITY: ICD-10-CM

## 2025-01-31 PROCEDURE — 99024 POSTOP FOLLOW-UP VISIT: CPT | Performed by: PHYSICIAN ASSISTANT

## 2025-02-04 ENCOUNTER — TELEPHONE (OUTPATIENT)
Dept: ORTHOPEDIC SURGERY | Facility: CLINIC | Age: 65
End: 2025-02-04
Payer: OTHER GOVERNMENT

## 2025-02-04 DIAGNOSIS — Z78.9 IMPAIRED MOBILITY AND ADLS: Primary | ICD-10-CM

## 2025-02-04 DIAGNOSIS — Z96.652 S/P TOTAL KNEE ARTHROPLASTY, LEFT: ICD-10-CM

## 2025-02-04 DIAGNOSIS — Z74.09 IMPAIRED MOBILITY AND ADLS: Primary | ICD-10-CM

## 2025-02-04 RX ORDER — HYDROCODONE BITARTRATE AND ACETAMINOPHEN 7.5; 325 MG/1; MG/1
1 TABLET ORAL EVERY 4 HOURS PRN
Qty: 42 TABLET | Refills: 0 | Status: SHIPPED | OUTPATIENT
Start: 2025-02-04

## 2025-02-10 DIAGNOSIS — I48.19 ATRIAL FIBRILLATION, PERSISTENT: ICD-10-CM

## 2025-02-10 RX ORDER — AMIODARONE HYDROCHLORIDE 200 MG/1
TABLET ORAL
Qty: 90 TABLET | Refills: 3 | Status: SHIPPED | OUTPATIENT
Start: 2025-02-10

## 2025-02-11 ENCOUNTER — TELEPHONE (OUTPATIENT)
Dept: ORTHOPEDIC SURGERY | Facility: CLINIC | Age: 65
End: 2025-02-11
Payer: OTHER GOVERNMENT

## 2025-02-11 DIAGNOSIS — Z78.9 IMPAIRED MOBILITY AND ADLS: ICD-10-CM

## 2025-02-11 DIAGNOSIS — Z74.09 IMPAIRED MOBILITY AND ADLS: ICD-10-CM

## 2025-02-11 DIAGNOSIS — Z96.652 S/P TOTAL KNEE ARTHROPLASTY, LEFT: ICD-10-CM

## 2025-02-11 RX ORDER — HYDROCODONE BITARTRATE AND ACETAMINOPHEN 7.5; 325 MG/1; MG/1
1 TABLET ORAL EVERY 4 HOURS PRN
Qty: 42 TABLET | Refills: 0 | Status: SHIPPED | OUTPATIENT
Start: 2025-02-11

## 2025-02-11 NOTE — TELEPHONE ENCOUNTER
PATIENT CALLED STATING HE HAS NOTICED A SUTURE TRYING TO BREAK THROUGH THE SKIN. PATIENT STATED THERAPIST TRIED TO REMOVE IT BUT WAS UNABLE TO REMOVE. ADVISED PATIENT TO COME BY OFFICE ON THURSDAY 02/13/25 SO WE CAN LOOK AT INCISION AND IF NEED TO HAVE ROSEANNE GURROLA EVALUATE INCISION. PATIENT VERBALIZED UNDERSTANDING.

## 2025-02-13 NOTE — TELEPHONE ENCOUNTER
PATIENT CAME INTO OFFICE TO HAVE INCISION EVALUATED. ROSEANNE NUNEZ EVALUATED INCISION THERE WAS A SMALL PIECE OF A SUTURE WAS PROTRUDING FROM THE TOP OF THE INCISION THIS WAS CLIPPED WITH A PAIR OF SCISSORS. PATIENT ADVISED TO KEEP INCISION CLEAN, DRY AND TO MONITOR FOR SIGNS OF INFECTION ( REDNESS, DRAINAGE OR FEVER) CALL OUR OFFICE WITH ANY NEW SYMPTOMS OR CONCERNS. PATIENT VERBALIZED UNDERSTANDING.

## 2025-02-27 ENCOUNTER — OFFICE VISIT (OUTPATIENT)
Dept: ORTHOPEDIC SURGERY | Facility: CLINIC | Age: 65
End: 2025-02-27
Payer: OTHER GOVERNMENT

## 2025-02-27 VITALS
BODY MASS INDEX: 29.76 KG/M2 | OXYGEN SATURATION: 96 % | SYSTOLIC BLOOD PRESSURE: 116 MMHG | WEIGHT: 207.89 LBS | DIASTOLIC BLOOD PRESSURE: 74 MMHG | HEART RATE: 58 BPM | HEIGHT: 70 IN

## 2025-02-27 DIAGNOSIS — Z96.652 S/P TOTAL KNEE ARTHROPLASTY, LEFT: Primary | ICD-10-CM

## 2025-02-27 DIAGNOSIS — Z78.9 IMPAIRED MOBILITY AND ADLS: ICD-10-CM

## 2025-02-27 DIAGNOSIS — Z96.652 S/P TOTAL KNEE ARTHROPLASTY, LEFT: ICD-10-CM

## 2025-02-27 DIAGNOSIS — Z74.09 IMPAIRED MOBILITY AND ADLS: ICD-10-CM

## 2025-02-27 DIAGNOSIS — M25.562 LEFT KNEE PAIN, UNSPECIFIED CHRONICITY: ICD-10-CM

## 2025-02-27 PROCEDURE — 99024 POSTOP FOLLOW-UP VISIT: CPT | Performed by: PHYSICIAN ASSISTANT

## 2025-02-27 RX ORDER — HYDROCODONE BITARTRATE AND ACETAMINOPHEN 7.5; 325 MG/1; MG/1
1 TABLET ORAL EVERY 6 HOURS PRN
Qty: 30 TABLET | Refills: 0 | Status: SHIPPED | OUTPATIENT
Start: 2025-02-27

## 2025-02-27 NOTE — PATIENT INSTRUCTIONS
Patient is doing very well. Advised to continue PT to completion to progress strength and ROM.  PT reordered today. Continue home exercises.     Continue icing knee as needed up to 4 times daily for no longer than 15-20 mins at a time.  Okay to utilize Voltaren gel.  Avoid most distal aspect of incision until he has no further drainage from that area.    Discussed importance of weaning off narcotics.  Refill on narcotics sent.     Follow-up in 6 weeks. We will obtain new x-rays of the left knee at next visit.    Call with questions/concerns.

## 2025-02-27 NOTE — PROGRESS NOTES
Chief Complaint  Pain and Follow-up of the Left Knee    Subjective          History of Present Illness     Carlos Manuel Sanchez is a 64 y.o. male presents today for a follow up of left knee. Patient is 6 weeks post op left total knee arthroplasty performed by Dr. James on 1/17/2025.  Patient reported a lot of stiffness and swelling in the knee at last visit.  Patient is attending physical therapy at [facility] in [place].    Presents today for follow-up and reports the knee pain has improved during the day but it still aches terribly at night.  He states it swells when he is on it a lot but otherwise the swelling has started to improve as well.  He has Voltaren gel at home but was not sure if he could start using that yet and wanted to wait until after this visit.  He continues with PT at [facility] and states it is going well.    Patient denies fevers or chills or complications from incision site other than 1 area at the most distal aspect of the incision that occasionally will drain a tiny amount.  Of note, patient was seen in the office a week or 2 ago and I removed a Prolene stitch that was sticking through the skin at the most proximal aspect.  He has no complaints from the proximal aspect of the incision since then.    No Known Allergies     Social History     Socioeconomic History    Marital status:    Tobacco Use    Smoking status: Never     Passive exposure: Past    Smokeless tobacco: Never    Tobacco comments:     Never use   Vaping Use    Vaping status: Never Used   Substance and Sexual Activity    Alcohol use: Yes     Alcohol/week: 10.0 standard drinks of alcohol     Types: 10 Cans of beer per week     Comment: Social    Drug use: Never    Sexual activity: Defer     Partners: Female     Birth control/protection: Vasectomy        Tobacco Use: Low Risk  (2/27/2025)    Patient History     Smoking Tobacco Use: Never     Smokeless Tobacco Use: Never     Passive Exposure: Past     Patient reports that they are  "a nonsmoker; cessation education not applicable.     I reviewed the patient's chief complaint, history of present illness, review of systems, past medical history, surgical history, family history, social history, medications, and allergy list.     REVIEW OF SYSTEMS    Constitutional: Denies fevers, chills, weight loss  Cardiovascular: Denies chest pain, shortness of breath  Skin: Denies rashes, acute skin changes  Neurologic: Denies headache, loss of consciousness  MSK: Left knee pain      Objective   Vital Signs:   /74   Pulse 58   Ht 177.8 cm (70\")   Wt 94.3 kg (207 lb 14.3 oz)   SpO2 96%   BMI 29.83 kg/m²     Body mass index is 29.83 kg/m².    Physical Exam    General: Alert, no acute distress  Gait: Nonantalgic without use of assistive devices  Left lower extremity: Incision well-healed.  No concerning signs for infection.  Mild to moderate knee effusion. Knee extensor mechanism intact. Hip flexion intact.  120 degrees flexion.  Full extension. Knee stable to varus and valgus stress. Calf soft, nontender. Demonstrates active ankle plantarflexion and dorsiflexion. Sensation intact over the dorsal and plantar foot. Palpable pedal pulses.             Assessment and Plan    Diagnoses and all orders for this visit:    1. S/P total knee arthroplasty, left (Primary)  -     Ambulatory Referral to Physical Therapy for Evaluation & Treatment    2. Left knee pain, unspecified chronicity  -     Ambulatory Referral to Physical Therapy for Evaluation & Treatment          Call or return if symptoms worsen or patient has any concerns.       Follow Up   Return in about 6 weeks (around 4/10/2025).  Patient Instructions   Patient is doing very well. Advised to continue PT to completion to progress strength and ROM.  PT reordered today. Continue home exercises.     Continue icing knee as needed up to 4 times daily for no longer than 15-20 mins at a time.  Okay to utilize Voltaren gel.  Avoid most distal aspect of " incision until he has no further drainage from that area.    Discussed importance of weaning off narcotics.  Refill on narcotics sent.     Follow-up in 6 weeks. We will obtain new x-rays of the left knee at next visit.    Call with questions/concerns.     Patient was given instructions and counseling regarding his condition or for health maintenance advice. Please see specific information pulled into the AVS if appropriate.     Svetlana Banks PA-C  02/27/25  12:02 EST

## 2025-03-10 ENCOUNTER — TELEPHONE (OUTPATIENT)
Dept: ORTHOPEDIC SURGERY | Facility: CLINIC | Age: 65
End: 2025-03-10
Payer: OTHER GOVERNMENT

## 2025-03-10 RX ORDER — HYDROCODONE BITARTRATE AND ACETAMINOPHEN 5; 325 MG/1; MG/1
1 TABLET ORAL EVERY 8 HOURS PRN
Qty: 21 TABLET | Refills: 0 | Status: SHIPPED | OUTPATIENT
Start: 2025-03-10 | End: 2025-03-17 | Stop reason: SDUPTHER

## 2025-03-17 ENCOUNTER — TELEPHONE (OUTPATIENT)
Dept: ORTHOPEDIC SURGERY | Facility: CLINIC | Age: 65
End: 2025-03-17
Payer: OTHER GOVERNMENT

## 2025-03-17 DIAGNOSIS — E78.5 HYPERLIPIDEMIA LDL GOAL <70: ICD-10-CM

## 2025-03-17 RX ORDER — HYDROCODONE BITARTRATE AND ACETAMINOPHEN 5; 325 MG/1; MG/1
1 TABLET ORAL EVERY 8 HOURS PRN
Qty: 21 TABLET | Refills: 0 | Status: SHIPPED | OUTPATIENT
Start: 2025-03-17

## 2025-03-17 NOTE — TELEPHONE ENCOUNTER
PATIENT REQUESTING PAIN MEDICATION REFILL. STATES HE IS STILL HAVING A LOT OF PAIN AT NIGHT.  VIVIENNE PIRES [place].

## 2025-03-18 RX ORDER — ROSUVASTATIN CALCIUM 20 MG/1
20 TABLET, COATED ORAL DAILY
Qty: 90 TABLET | Refills: 3 | Status: SHIPPED | OUTPATIENT
Start: 2025-03-18

## 2025-03-18 NOTE — TELEPHONE ENCOUNTER
Rx Refill Note  Requested Prescriptions     Pending Prescriptions Disp Refills    rosuvastatin (CRESTOR) 20 MG tablet [Pharmacy Med Name: ROSUVASTATIN TABS 20MG] 90 tablet 3     Sig: TAKE 1 TABLET DAILY        LAST OFFICE VISIT:  12/5/2024     NEXT OFFICE VISIT:  06/05/2025     Does the medication requests match the last office note:    [x] Yes   [] No    Does this refill request meet protocol details for MA to approve:     [x] Yes   [] No

## 2025-03-24 DIAGNOSIS — E78.5 HYPERLIPIDEMIA LDL GOAL <70: ICD-10-CM

## 2025-03-24 RX ORDER — EVOLOCUMAB 140 MG/ML
INJECTION, SOLUTION SUBCUTANEOUS
Qty: 6 ML | Refills: 3 | Status: SHIPPED | OUTPATIENT
Start: 2025-03-24

## 2025-03-27 ENCOUNTER — TELEPHONE (OUTPATIENT)
Dept: ORTHOPEDIC SURGERY | Facility: CLINIC | Age: 65
End: 2025-03-27
Payer: OTHER GOVERNMENT

## 2025-03-27 RX ORDER — HYDROCODONE BITARTRATE AND ACETAMINOPHEN 5; 325 MG/1; MG/1
1 TABLET ORAL EVERY 8 HOURS PRN
Qty: 21 TABLET | Refills: 0 | Status: SHIPPED | OUTPATIENT
Start: 2025-03-27

## 2025-03-31 DIAGNOSIS — I48.19 ATRIAL FIBRILLATION, PERSISTENT: Primary | ICD-10-CM

## 2025-04-08 ENCOUNTER — CLINICAL SUPPORT (OUTPATIENT)
Dept: FAMILY MEDICINE CLINIC | Facility: CLINIC | Age: 65
End: 2025-04-08
Payer: OTHER GOVERNMENT

## 2025-04-08 DIAGNOSIS — I48.19 ATRIAL FIBRILLATION, PERSISTENT: ICD-10-CM

## 2025-04-08 LAB
ANION GAP SERPL CALCULATED.3IONS-SCNC: 8.2 MMOL/L (ref 5–15)
BUN SERPL-MCNC: 14 MG/DL (ref 8–23)
BUN/CREAT SERPL: 11.2 (ref 7–25)
CALCIUM SPEC-SCNC: 9.3 MG/DL (ref 8.6–10.5)
CHLORIDE SERPL-SCNC: 102 MMOL/L (ref 98–107)
CO2 SERPL-SCNC: 25.8 MMOL/L (ref 22–29)
CREAT SERPL-MCNC: 1.25 MG/DL (ref 0.76–1.27)
DEPRECATED RDW RBC AUTO: 39.4 FL (ref 37–54)
EGFRCR SERPLBLD CKD-EPI 2021: 64.7 ML/MIN/1.73
ERYTHROCYTE [DISTWIDTH] IN BLOOD BY AUTOMATED COUNT: 12.1 % (ref 12.3–15.4)
GLUCOSE SERPL-MCNC: 119 MG/DL (ref 65–99)
HCT VFR BLD AUTO: 48.5 % (ref 37.5–51)
HGB BLD-MCNC: 15.8 G/DL (ref 13–17.7)
MCH RBC QN AUTO: 29.3 PG (ref 26.6–33)
MCHC RBC AUTO-ENTMCNC: 32.6 G/DL (ref 31.5–35.7)
MCV RBC AUTO: 89.8 FL (ref 79–97)
PLATELET # BLD AUTO: 198 10*3/MM3 (ref 140–450)
PMV BLD AUTO: 9.6 FL (ref 6–12)
POTASSIUM SERPL-SCNC: 4.9 MMOL/L (ref 3.5–5.2)
RBC # BLD AUTO: 5.4 10*6/MM3 (ref 4.14–5.8)
SODIUM SERPL-SCNC: 136 MMOL/L (ref 136–145)
WBC NRBC COR # BLD AUTO: 4.05 10*3/MM3 (ref 3.4–10.8)

## 2025-04-08 PROCEDURE — 85027 COMPLETE CBC AUTOMATED: CPT

## 2025-04-08 PROCEDURE — 36415 COLL VENOUS BLD VENIPUNCTURE: CPT | Performed by: FAMILY MEDICINE

## 2025-04-08 PROCEDURE — 80048 BASIC METABOLIC PNL TOTAL CA: CPT

## 2025-04-09 NOTE — PROGRESS NOTES
Chief Complaint  Follow-up and Pain of the Left Knee    Subjective          History of Present Illness     Carlos Manuel Sanchez is a 64 y.o. male presents today for a follow up of left knee. Patient is 12 weeks post op left total knee arthroplasty performed by Dr. James on 1/17/2025.  Patient reported a lot of stiffness and swelling in the knee at last visit.  Patient is attending physical therapy at [facility] in [place].  Last visit reported still a lot of achiness at night and swelling with prolonged activity but that was all improving.  Patient did note 1 area at the most distal aspect of the incision that occasionally will drain a small amount of fluid but there was no open or active wound.    History of Present Illness  He reports a satisfactory condition of his knee, although he notes persistent swelling. He experiences pain at the end of the day, which he quantifies as a 7 on a scale of 10 most, but most days is around a 4. He manages the pain with Tylenol, which he finds effective. He has an upcoming dental appointment scheduled in the next few months.      Patient denies redness, drainage, or complications from incision. Denies fever or chills.     No Known Allergies     Social History     Socioeconomic History    Marital status:    Tobacco Use    Smoking status: Never     Passive exposure: Past    Smokeless tobacco: Never    Tobacco comments:     Never use   Vaping Use    Vaping status: Never Used   Substance and Sexual Activity    Alcohol use: Yes     Alcohol/week: 10.0 standard drinks of alcohol     Types: 10 Cans of beer per week     Comment: Social    Drug use: Never    Sexual activity: Defer     Partners: Female     Birth control/protection: Vasectomy        Tobacco Use: Low Risk  (4/10/2025)    Patient History     Smoking Tobacco Use: Never     Smokeless Tobacco Use: Never     Passive Exposure: Past     Patient reports that they are a nonsmoker; cessation education not applicable.     I reviewed  "the patient's chief complaint, history of present illness, review of systems, past medical history, surgical history, family history, social history, medications, and allergy list.     REVIEW OF SYSTEMS    Constitutional: Denies fevers, chills, weight loss  Cardiovascular: Denies chest pain, shortness of breath  Skin: Denies rashes, acute skin changes  Neurologic: Denies headache, loss of consciousness  MSK: Left knee pain      Objective   Vital Signs:   /74   Pulse 53   Ht 177.8 cm (70\")   Wt 89.8 kg (198 lb)   SpO2 95%   BMI 28.41 kg/m²     Body mass index is 28.41 kg/m².    Physical Exam    Physical Exam  The knee incision has healed well. There is still a little bit of swelling in the knee.    General: Alert, no acute distress  Gait: Nonantalgic   Left lower extremity: Surgical incision well-healing.  No concerning signs for infection. Mild knee effusion. Knee extensor mechanism intact. Hip flexion intact. 125 degrees flexion.  Full extension. Knee stable to varus and valgus stress. Calf soft, nontender. Demonstrates active ankle plantarflexion and dorsiflexion. Sensation intact over the dorsal and plantar foot. Palpable pedal pulses.       Imaging Results (Most Recent)       Procedure Component Value Units Date/Time    XR Knee 3 View Left [[id number]] Resulted: 04/10/25 0809     Updated: 04/10/25 0809    Narrative:      X-Ray Report:  Study: X-rays ordered, taken in the office, and reviewed today.   Site: Left knee Xray  Indication: Left total knee arthroplasty follow up.   View: AP/Lateral, Standing, and [place] view(s)  Findings: Intact left total knee arthroplasty. No evidence of hardware   malfunction, complication or loosening. No periprosthetic fractures   visualized. Patella is midline tracking on sunrise view.   Prior studies available for comparison: yes                      Assessment and Plan    Diagnoses and all orders for this visit:    1. S/P total knee arthroplasty, left (Primary)  -    "  XR Knee 3 View Left    2. Left knee pain, unspecified chronicity    Other orders  -     amoxicillin (AMOXIL) 500 MG capsule; Take 1 capsule by mouth 1 time for 1 dose. Take all 4 capsules 1 hour prior to teeth cleaning/dental procedure with food.  Dispense: 4 capsule; Refill: 0        Assessment & Plan      Prescription for antibiotic was sent through to the pharmacy today.    Call or return if symptoms worsen or patient has any concerns.       Follow Up   No follow-ups on file.  Patient Instructions   X-rays were reviewed with the patient today. Hardware is stable and intact. Patient is doing well.     Incision well healed. May apply Vitamin E, cocoa butter, etc. over incision to aid in scar appearance. Educated that numbness over the incision can still remain at this point, and should continue to improve for up to 1 year postop. However, at the year paul if still experiencing numbness it may not return.    Encouraged to continue home exercises for ROM and strengthening.   May continue icing as needed for no more than 20 minutes at a time for comfort and inflammation.   Discussed avoiding kneeling directly on to the prosthetic and avoid deep squatting.     Discussed the need for antibiotic prophylaxis with any dental procedures following total joint replacement for life. Patient instructed to contact office if dental office is reluctant to prescribe antibiotics prior to procedure and we will prescribe them.      Patient will follow-up in 9 months. We will obtain new x-rays of the left knee at next visit.    Call or return if symptoms worsen or patient has any concerns.     Patient was given instructions and counseling regarding his condition or for health maintenance advice. Please see specific information pulled into the AVS if appropriate.     Svetlana Banks PA-C  04/10/25  08:20 EDT    Patient or patient representative verbalized consent for the use of Ambient Listening during the visit with  Svetlana Banks  MIGUEL ANGEL for chart documentation. 4/10/2025  08:20 EDT

## 2025-04-09 NOTE — PATIENT INSTRUCTIONS
X-rays were reviewed with the patient today. Hardware is stable and intact. Patient is doing well.     Incision well healed. May apply Vitamin E, cocoa butter, etc. over incision to aid in scar appearance. Educated that numbness over the incision can still remain at this point, and should continue to improve for up to 1 year postop. However, at the year paul if still experiencing numbness it may not return.    Encouraged to continue home exercises for ROM and strengthening.   May continue icing as needed for no more than 20 minutes at a time for comfort and inflammation.   Discussed avoiding kneeling directly on to the prosthetic and avoid deep squatting.     Discussed the need for antibiotic prophylaxis with any dental procedures following total joint replacement for life. Patient instructed to contact office if dental office is reluctant to prescribe antibiotics prior to procedure and we will prescribe them.      Patient will follow-up in 9 months. We will obtain new x-rays of the left knee at next visit.    Call or return if symptoms worsen or patient has any concerns.

## 2025-04-10 ENCOUNTER — OFFICE VISIT (OUTPATIENT)
Dept: ORTHOPEDIC SURGERY | Facility: CLINIC | Age: 65
End: 2025-04-10
Payer: OTHER GOVERNMENT

## 2025-04-10 VITALS
OXYGEN SATURATION: 95 % | BODY MASS INDEX: 28.35 KG/M2 | HEIGHT: 70 IN | WEIGHT: 198 LBS | SYSTOLIC BLOOD PRESSURE: 124 MMHG | HEART RATE: 53 BPM | DIASTOLIC BLOOD PRESSURE: 74 MMHG

## 2025-04-10 DIAGNOSIS — M25.562 LEFT KNEE PAIN, UNSPECIFIED CHRONICITY: ICD-10-CM

## 2025-04-10 DIAGNOSIS — Z96.652 S/P TOTAL KNEE ARTHROPLASTY, LEFT: Primary | ICD-10-CM

## 2025-04-10 PROCEDURE — 99024 POSTOP FOLLOW-UP VISIT: CPT | Performed by: PHYSICIAN ASSISTANT

## 2025-04-10 RX ORDER — AMOXICILLIN 500 MG/1
500 CAPSULE ORAL ONCE
Qty: 4 CAPSULE | Refills: 0 | Status: SHIPPED | OUTPATIENT
Start: 2025-04-10 | End: 2025-04-10

## 2025-04-14 ENCOUNTER — ANESTHESIA (OUTPATIENT)
Dept: CARDIOLOGY | Facility: HOSPITAL | Age: 65
End: 2025-04-14
Payer: OTHER GOVERNMENT

## 2025-04-14 ENCOUNTER — HOSPITAL ENCOUNTER (OUTPATIENT)
Facility: HOSPITAL | Age: 65
Setting detail: HOSPITAL OUTPATIENT SURGERY
Discharge: HOME | End: 2025-04-14
Attending: INTERNAL MEDICINE | Admitting: INTERNAL MEDICINE
Payer: OTHER GOVERNMENT

## 2025-04-14 ENCOUNTER — ANESTHESIA EVENT (OUTPATIENT)
Dept: CARDIOLOGY | Facility: HOSPITAL | Age: 65
End: 2025-04-14
Payer: OTHER GOVERNMENT

## 2025-04-14 VITALS
BODY MASS INDEX: 28.63 KG/M2 | RESPIRATION RATE: 18 BRPM | WEIGHT: 200 LBS | TEMPERATURE: 96.8 F | DIASTOLIC BLOOD PRESSURE: 72 MMHG | SYSTOLIC BLOOD PRESSURE: 128 MMHG | HEART RATE: 55 BPM | HEIGHT: 70 IN | OXYGEN SATURATION: 97 %

## 2025-04-14 DIAGNOSIS — Z96.652 S/P TOTAL KNEE ARTHROPLASTY, LEFT: Primary | ICD-10-CM

## 2025-04-14 DIAGNOSIS — I48.19 ATRIAL FIBRILLATION, PERSISTENT: ICD-10-CM

## 2025-04-14 LAB
ACT BLD: 302 SECONDS (ref 82–152)
ACT BLD: 366 SECONDS (ref 82–152)
GLUCOSE BLDC GLUCOMTR-MCNC: 129 MG/DL (ref 70–130)
QT INTERVAL: 477 MS
QT INTERVAL: 481 MS
QTC INTERVAL: 434 MS
QTC INTERVAL: 445 MS

## 2025-04-14 PROCEDURE — 25810000003 SODIUM CHLORIDE 0.9 % SOLUTION: Performed by: INTERNAL MEDICINE

## 2025-04-14 PROCEDURE — 82948 REAGENT STRIP/BLOOD GLUCOSE: CPT

## 2025-04-14 PROCEDURE — 93656 COMPRE EP EVAL ABLTJ ATR FIB: CPT | Performed by: INTERNAL MEDICINE

## 2025-04-14 PROCEDURE — 25010000002 GLYCOPYRROLATE 0.2 MG/ML SOLUTION: Performed by: STUDENT IN AN ORGANIZED HEALTH CARE EDUCATION/TRAINING PROGRAM

## 2025-04-14 PROCEDURE — C1769 GUIDE WIRE: HCPCS | Performed by: INTERNAL MEDICINE

## 2025-04-14 PROCEDURE — C1732 CATH, EP, DIAG/ABL, 3D/VECT: HCPCS | Performed by: INTERNAL MEDICINE

## 2025-04-14 PROCEDURE — 25010000002 DEXAMETHASONE PER 1 MG: Performed by: STUDENT IN AN ORGANIZED HEALTH CARE EDUCATION/TRAINING PROGRAM

## 2025-04-14 PROCEDURE — 25010000002 SUGAMMADEX 200 MG/2ML SOLUTION: Performed by: NURSE ANESTHETIST, CERTIFIED REGISTERED

## 2025-04-14 PROCEDURE — 93005 ELECTROCARDIOGRAM TRACING: CPT | Performed by: INTERNAL MEDICINE

## 2025-04-14 PROCEDURE — 93010 ELECTROCARDIOGRAM REPORT: CPT | Performed by: STUDENT IN AN ORGANIZED HEALTH CARE EDUCATION/TRAINING PROGRAM

## 2025-04-14 PROCEDURE — 25010000002 LIDOCAINE 2% SOLUTION: Performed by: STUDENT IN AN ORGANIZED HEALTH CARE EDUCATION/TRAINING PROGRAM

## 2025-04-14 PROCEDURE — S0260 H&P FOR SURGERY: HCPCS | Performed by: INTERNAL MEDICINE

## 2025-04-14 PROCEDURE — 93005 ELECTROCARDIOGRAM TRACING: CPT

## 2025-04-14 PROCEDURE — C1733 CATH, EP, OTHR THAN COOL-TIP: HCPCS | Performed by: INTERNAL MEDICINE

## 2025-04-14 PROCEDURE — C1760 CLOSURE DEV, VASC: HCPCS | Performed by: INTERNAL MEDICINE

## 2025-04-14 PROCEDURE — C1766 INTRO/SHEATH,STRBLE,NON-PEEL: HCPCS | Performed by: INTERNAL MEDICINE

## 2025-04-14 PROCEDURE — C1894 INTRO/SHEATH, NON-LASER: HCPCS | Performed by: INTERNAL MEDICINE

## 2025-04-14 PROCEDURE — 25010000002 HEPARIN (PORCINE) PER 1000 UNITS: Performed by: INTERNAL MEDICINE

## 2025-04-14 PROCEDURE — 85347 COAGULATION TIME ACTIVATED: CPT

## 2025-04-14 PROCEDURE — 25010000002 PROPOFOL 10 MG/ML EMULSION: Performed by: STUDENT IN AN ORGANIZED HEALTH CARE EDUCATION/TRAINING PROGRAM

## 2025-04-14 PROCEDURE — 25010000002 LIDOCAINE-EPINEPHRINE (PF) 1 %-1:200000 SOLUTION: Performed by: INTERNAL MEDICINE

## 2025-04-14 PROCEDURE — 25010000002 PHENYLEPHRINE 10 MG/ML SOLUTION: Performed by: STUDENT IN AN ORGANIZED HEALTH CARE EDUCATION/TRAINING PROGRAM

## 2025-04-14 PROCEDURE — 25010000002 ONDANSETRON PER 1 MG: Performed by: NURSE ANESTHETIST, CERTIFIED REGISTERED

## 2025-04-14 PROCEDURE — 25010000002 PROTAMINE SULFATE PER 10 MG: Performed by: INTERNAL MEDICINE

## 2025-04-14 PROCEDURE — C1759 CATH, INTRA ECHOCARDIOGRAPHY: HCPCS | Performed by: INTERNAL MEDICINE

## 2025-04-14 RX ORDER — FENTANYL CITRATE 50 UG/ML
50 INJECTION, SOLUTION INTRAMUSCULAR; INTRAVENOUS ONCE AS NEEDED
Status: DISCONTINUED | OUTPATIENT
Start: 2025-04-14 | End: 2025-04-14 | Stop reason: HOSPADM

## 2025-04-14 RX ORDER — SODIUM CHLORIDE, SODIUM LACTATE, POTASSIUM CHLORIDE, CALCIUM CHLORIDE 600; 310; 30; 20 MG/100ML; MG/100ML; MG/100ML; MG/100ML
9 INJECTION, SOLUTION INTRAVENOUS CONTINUOUS
Status: DISCONTINUED | OUTPATIENT
Start: 2025-04-14 | End: 2025-04-14 | Stop reason: HOSPADM

## 2025-04-14 RX ORDER — DEXAMETHASONE SODIUM PHOSPHATE 4 MG/ML
INJECTION, SOLUTION INTRA-ARTICULAR; INTRALESIONAL; INTRAMUSCULAR; INTRAVENOUS; SOFT TISSUE AS NEEDED
Status: DISCONTINUED | OUTPATIENT
Start: 2025-04-14 | End: 2025-04-14 | Stop reason: SURG

## 2025-04-14 RX ORDER — FLUMAZENIL 0.1 MG/ML
0.2 INJECTION INTRAVENOUS AS NEEDED
Status: DISCONTINUED | OUTPATIENT
Start: 2025-04-14 | End: 2025-04-14 | Stop reason: HOSPADM

## 2025-04-14 RX ORDER — SODIUM CHLORIDE 0.9 % (FLUSH) 0.9 %
3-10 SYRINGE (ML) INJECTION AS NEEDED
Status: DISCONTINUED | OUTPATIENT
Start: 2025-04-14 | End: 2025-04-14 | Stop reason: HOSPADM

## 2025-04-14 RX ORDER — EPHEDRINE SULFATE 50 MG/ML
INJECTION, SOLUTION INTRAVENOUS AS NEEDED
Status: DISCONTINUED | OUTPATIENT
Start: 2025-04-14 | End: 2025-04-14 | Stop reason: SURG

## 2025-04-14 RX ORDER — ONDANSETRON 2 MG/ML
4 INJECTION INTRAMUSCULAR; INTRAVENOUS ONCE AS NEEDED
Status: DISCONTINUED | OUTPATIENT
Start: 2025-04-14 | End: 2025-04-14 | Stop reason: HOSPADM

## 2025-04-14 RX ORDER — DROPERIDOL 2.5 MG/ML
0.62 INJECTION, SOLUTION INTRAMUSCULAR; INTRAVENOUS
Status: DISCONTINUED | OUTPATIENT
Start: 2025-04-14 | End: 2025-04-14 | Stop reason: HOSPADM

## 2025-04-14 RX ORDER — SODIUM CHLORIDE 0.9 % (FLUSH) 0.9 %
10 SYRINGE (ML) INJECTION AS NEEDED
Status: DISCONTINUED | OUTPATIENT
Start: 2025-04-14 | End: 2025-04-14 | Stop reason: HOSPADM

## 2025-04-14 RX ORDER — PHENYLEPHRINE HYDROCHLORIDE 10 MG/ML
INJECTION INTRAVENOUS AS NEEDED
Status: DISCONTINUED | OUTPATIENT
Start: 2025-04-14 | End: 2025-04-14 | Stop reason: SURG

## 2025-04-14 RX ORDER — SODIUM CHLORIDE 9 MG/ML
75 INJECTION, SOLUTION INTRAVENOUS CONTINUOUS
Status: DISCONTINUED | OUTPATIENT
Start: 2025-04-14 | End: 2025-04-14 | Stop reason: HOSPADM

## 2025-04-14 RX ORDER — SODIUM CHLORIDE 0.9 % (FLUSH) 0.9 %
3 SYRINGE (ML) INJECTION EVERY 12 HOURS SCHEDULED
Status: DISCONTINUED | OUTPATIENT
Start: 2025-04-14 | End: 2025-04-14 | Stop reason: HOSPADM

## 2025-04-14 RX ORDER — PROMETHAZINE HYDROCHLORIDE 25 MG/1
25 SUPPOSITORY RECTAL ONCE AS NEEDED
Status: DISCONTINUED | OUTPATIENT
Start: 2025-04-14 | End: 2025-04-14 | Stop reason: HOSPADM

## 2025-04-14 RX ORDER — PROPOFOL 10 MG/ML
VIAL (ML) INTRAVENOUS AS NEEDED
Status: DISCONTINUED | OUTPATIENT
Start: 2025-04-14 | End: 2025-04-14 | Stop reason: SURG

## 2025-04-14 RX ORDER — AMOXICILLIN 500 MG/1
CAPSULE ORAL
Qty: 4 CAPSULE | Refills: 0 | Status: SHIPPED | OUTPATIENT
Start: 2025-04-14

## 2025-04-14 RX ORDER — MIDAZOLAM HYDROCHLORIDE 1 MG/ML
1 INJECTION, SOLUTION INTRAMUSCULAR; INTRAVENOUS
Status: DISCONTINUED | OUTPATIENT
Start: 2025-04-14 | End: 2025-04-14 | Stop reason: HOSPADM

## 2025-04-14 RX ORDER — HEPARIN SODIUM 1000 [USP'U]/ML
INJECTION, SOLUTION INTRAVENOUS; SUBCUTANEOUS
Status: DISCONTINUED | OUTPATIENT
Start: 2025-04-14 | End: 2025-04-14 | Stop reason: HOSPADM

## 2025-04-14 RX ORDER — ROCURONIUM BROMIDE 10 MG/ML
INJECTION, SOLUTION INTRAVENOUS AS NEEDED
Status: DISCONTINUED | OUTPATIENT
Start: 2025-04-14 | End: 2025-04-14 | Stop reason: SURG

## 2025-04-14 RX ORDER — FAMOTIDINE 10 MG/ML
20 INJECTION, SOLUTION INTRAVENOUS ONCE
Status: DISCONTINUED | OUTPATIENT
Start: 2025-04-14 | End: 2025-04-14 | Stop reason: HOSPADM

## 2025-04-14 RX ORDER — NALOXONE HCL 0.4 MG/ML
0.2 VIAL (ML) INJECTION AS NEEDED
Status: DISCONTINUED | OUTPATIENT
Start: 2025-04-14 | End: 2025-04-14 | Stop reason: HOSPADM

## 2025-04-14 RX ORDER — ONDANSETRON 2 MG/ML
INJECTION INTRAMUSCULAR; INTRAVENOUS AS NEEDED
Status: DISCONTINUED | OUTPATIENT
Start: 2025-04-14 | End: 2025-04-14 | Stop reason: SURG

## 2025-04-14 RX ORDER — PROMETHAZINE HYDROCHLORIDE 12.5 MG/1
25 TABLET ORAL ONCE AS NEEDED
Status: DISCONTINUED | OUTPATIENT
Start: 2025-04-14 | End: 2025-04-14 | Stop reason: HOSPADM

## 2025-04-14 RX ORDER — NITROGLYCERIN 0.4 MG/1
0.4 TABLET SUBLINGUAL
Status: DISCONTINUED | OUTPATIENT
Start: 2025-04-14 | End: 2025-04-14 | Stop reason: HOSPADM

## 2025-04-14 RX ORDER — LIDOCAINE HYDROCHLORIDE 10 MG/ML
0.5 INJECTION, SOLUTION INFILTRATION; PERINEURAL ONCE AS NEEDED
Status: DISCONTINUED | OUTPATIENT
Start: 2025-04-14 | End: 2025-04-14 | Stop reason: HOSPADM

## 2025-04-14 RX ORDER — LIDOCAINE HYDROCHLORIDE 20 MG/ML
INJECTION, SOLUTION INFILTRATION; PERINEURAL AS NEEDED
Status: DISCONTINUED | OUTPATIENT
Start: 2025-04-14 | End: 2025-04-14 | Stop reason: SURG

## 2025-04-14 RX ORDER — GLYCOPYRROLATE 0.2 MG/ML
INJECTION INTRAMUSCULAR; INTRAVENOUS AS NEEDED
Status: DISCONTINUED | OUTPATIENT
Start: 2025-04-14 | End: 2025-04-14 | Stop reason: SURG

## 2025-04-14 RX ORDER — DIPHENHYDRAMINE HYDROCHLORIDE 50 MG/ML
12.5 INJECTION, SOLUTION INTRAMUSCULAR; INTRAVENOUS
Status: DISCONTINUED | OUTPATIENT
Start: 2025-04-14 | End: 2025-04-14 | Stop reason: HOSPADM

## 2025-04-14 RX ORDER — PROTAMINE SULFATE 10 MG/ML
INJECTION, SOLUTION INTRAVENOUS
Status: DISCONTINUED | OUTPATIENT
Start: 2025-04-14 | End: 2025-04-14 | Stop reason: HOSPADM

## 2025-04-14 RX ADMIN — PHENYLEPHRINE HYDROCHLORIDE 100 MCG: 10 INJECTION INTRAVENOUS at 11:25

## 2025-04-14 RX ADMIN — ROCURONIUM BROMIDE 50 MG: 10 INJECTION INTRAVENOUS at 11:00

## 2025-04-14 RX ADMIN — SODIUM CHLORIDE 75 ML/HR: 9 INJECTION, SOLUTION INTRAVENOUS at 08:35

## 2025-04-14 RX ADMIN — PHENYLEPHRINE HYDROCHLORIDE 150 MCG: 10 INJECTION INTRAVENOUS at 11:11

## 2025-04-14 RX ADMIN — PHENYLEPHRINE HYDROCHLORIDE 200 MCG: 10 INJECTION INTRAVENOUS at 10:42

## 2025-04-14 RX ADMIN — EPHEDRINE SULFATE 5 MG: 50 INJECTION INTRAVENOUS at 11:25

## 2025-04-14 RX ADMIN — PHENYLEPHRINE HYDROCHLORIDE 100 MCG: 10 INJECTION INTRAVENOUS at 11:48

## 2025-04-14 RX ADMIN — PHENYLEPHRINE HYDROCHLORIDE 150 MCG: 10 INJECTION INTRAVENOUS at 10:57

## 2025-04-14 RX ADMIN — SUGAMMADEX 200 MG: 100 INJECTION, SOLUTION INTRAVENOUS at 11:39

## 2025-04-14 RX ADMIN — PHENYLEPHRINE HYDROCHLORIDE 100 MCG: 10 INJECTION INTRAVENOUS at 10:47

## 2025-04-14 RX ADMIN — GLYCOPYRROLATE 0.2 MG: 0.2 INJECTION INTRAMUSCULAR; INTRAVENOUS at 10:38

## 2025-04-14 RX ADMIN — PROPOFOL 150 MG: 10 INJECTION, EMULSION INTRAVENOUS at 10:09

## 2025-04-14 RX ADMIN — SODIUM CHLORIDE: 9 INJECTION, SOLUTION INTRAVENOUS at 11:49

## 2025-04-14 RX ADMIN — PROPOFOL 50 MG: 10 INJECTION, EMULSION INTRAVENOUS at 10:17

## 2025-04-14 RX ADMIN — DEXAMETHASONE SODIUM PHOSPHATE 4 MG: 4 INJECTION, SOLUTION INTRA-ARTICULAR; INTRALESIONAL; INTRAMUSCULAR; INTRAVENOUS; SOFT TISSUE at 10:09

## 2025-04-14 RX ADMIN — EPHEDRINE SULFATE 5 MG: 50 INJECTION INTRAVENOUS at 11:14

## 2025-04-14 RX ADMIN — ONDANSETRON 4 MG: 2 INJECTION INTRAMUSCULAR; INTRAVENOUS at 11:39

## 2025-04-14 RX ADMIN — EPHEDRINE SULFATE 5 MG: 50 INJECTION INTRAVENOUS at 11:48

## 2025-04-14 RX ADMIN — LIDOCAINE HYDROCHLORIDE 100 MG: 20 INJECTION, SOLUTION INFILTRATION; PERINEURAL at 10:09

## 2025-04-14 RX ADMIN — ROCURONIUM BROMIDE 80 MG: 10 INJECTION INTRAVENOUS at 10:09

## 2025-04-14 NOTE — ANESTHESIA PREPROCEDURE EVALUATION
Anesthesia Evaluation     NPO Solid Status: > 8 hours             Airway   Mallampati: II  TM distance: >3 FB  Neck ROM: full  No difficulty expected  Dental - normal exam     Pulmonary    Cardiovascular     PT is on anticoagulation therapy  Patient on routine beta blocker    (+) hypertension, CAD, cardiac stents , dysrhythmias Atrial Fib, hyperlipidemia      Neuro/Psych  GI/Hepatic/Renal/Endo    (+) diabetes mellitus type 2    Musculoskeletal     Abdominal    Substance History      OB/GYN          Other   arthritis,       Other Comment: Rotator cuff and knee replacement              Anesthesia Plan    ASA 3     general     intravenous induction     Anesthetic plan, risks, benefits, and alternatives have been provided, discussed and informed consent has been obtained with: patient.    CODE STATUS:

## 2025-04-14 NOTE — Clinical Note
Replaced previous sheath in the right femoral vein. 8fr out, faradrive sheath, versa connect dilator, and baylis wire all in together

## 2025-04-14 NOTE — H&P
[facility]   HISTORY AND PHYSICAL    Patient Name:Carlos Manuel Sanchez  : 1960  MRN: [id number]  Primary Care Physician: Kurt Arango DO  Date of admission: 2025    Subjective   Subjective     Chief Complaint:   Atrial Fibrillation    History of Present Illness   Carlos Manuel Sanchez is a 64 y.o. male with a history of paroxysmal atrial fibrillation.     He was diagnosed with atrial fibrillation in May of 2023.      Prior treatment has consisted of cardioversion and treatment with amiodarone.     He has a history of CAD with prior PCI    Review of Systems   Constitutional:  Negative for activity change and fatigue.   Eyes: Negative.    Respiratory:  Negative for chest tightness and shortness of breath.    Cardiovascular:  Negative for chest pain, palpitations and leg swelling.   Gastrointestinal: Negative.    Endocrine: Negative.    Genitourinary: Negative.    Musculoskeletal: Negative.    Skin: Negative.    Neurological:  Negative for dizziness and syncope.   Hematological: Negative.    Psychiatric/Behavioral: Negative.           Personal History     Past Medical History:   Diagnosis Date    Arrhythmia     Arthritis     Atrial fibrillation     Borderline diabetic     Colon polyp 10/18/2021    Coronary artery disease     BLOCKAGE 1 STENT PLACED , SEE'S DR WARD, DENIED CP/SOA    Diabetes mellitus 2/15/24    HTN (hypertension)     Hyperlipemia     Rotator cuff syndrome 2022    When pulling back compound bow during hunting season    Rotator cuff tear, right        Past Surgical History:   Procedure Laterality Date    APPENDECTOMY      COLONOSCOPY N/A 10/18/2021    Procedure: COLONOSCOPY with hot snare polypectomies, clips applied x2;  Surgeon: Haroldo Perez MD;  Location: [facility] ENDOSCOPY;  Service: Gastroenterology;  Laterality: N/A;  cecum polyp, transverse polyp    COLONOSCOPY N/A 2024    Procedure: COLONOSCOPY;  Surgeon: Haroldo Perez MD;  Location: [facility] ENDOSCOPY;   Service: Gastroenterology;  Laterality: N/A;  diverticulosis    CORONARY STENT PLACEMENT  2000 1 STENT PLACED    EXCISION LESION Right 02/25/2022    Procedure: excision of subcutaneous mass from right arm;  Surgeon: Jeff Tomas MD;  Location: [facility] OR;  Service: General;  Laterality: Right;    KNEE ARTHROSCOPY W/ MENISCECTOMY Left 1/3/2024    Procedure: LEFT KNEE ARTHROSCOPY WITH PARTIAL MEDIAL MENISCECTOMY CHONDROPLASTY;  Surgeon: Lisa James MD;  Location: [facility];  Service: Orthopedics;  Laterality: Left;    ROTATOR CUFF REPAIR Left     SHOULDER ARTHROSCOPY W/ ROTATOR CUFF REPAIR Right 01/25/2023    Procedure: SHOULDER ARTHROSCOPY WITH ROTATOR CUFF REPAIR, SUBCROMIAL DECOMPRESSION,DISTAL CLAVICLE RESECTION;  Surgeon: Lisa James MD;  Location: [facility];  Service: Orthopedics;  Laterality: Right;    SHOULDER SURGERY  01/25/2023    Right shoulder    TOTAL KNEE ARTHROPLASTY Left 1/17/2025    Procedure: LEFT TOTAL KNEE ARTHROPLASTY: Replace left knee with artificial implants;  Surgeon: Lisa James MD;  Location: [facility] MAIN OR;  Service: Orthopedics;  Laterality: Left;       Family History: His family history includes Cancer in his father; Heart disease in his brother, father, mother, and sister; Lung cancer in an other family member.     Social History: He  reports that he has never smoked. He has been exposed to tobacco smoke. He has never used smokeless tobacco. He reports current alcohol use of about 6.0 standard drinks of alcohol per week. He reports that he does not use drugs.    Home Medications:  B Complete, Evolocumab, amoxicillin, apixaban, aspirin, lisinopril, metoprolol succinate XL, rosuvastatin, and sildenafil    Allergies:  He has no known allergies.    Objective    Objective     Vitals:    Temp:  [96.8 °F (36 °C)] 96.8 °F (36 °C)  Heart Rate:  [50-58] 55  Resp:  [18] 18  BP: (106-145)/(63-78) 128/72    Physical Exam  Vitals and nursing note reviewed.    Constitutional:       General: He is not in acute distress.     Appearance: He is not diaphoretic.   HENT:      Mouth/Throat:      Pharynx: No oropharyngeal exudate.   Eyes:      General: No scleral icterus.  Neck:      Trachea: No tracheal deviation.   Cardiovascular:      Rate and Rhythm: Normal rate and regular rhythm.   Pulmonary:      Effort: Pulmonary effort is normal. No respiratory distress.      Breath sounds: Normal breath sounds.   Abdominal:      General: There is no distension.      Palpations: Abdomen is soft.   Skin:     General: Skin is warm and dry.   Neurological:      Mental Status: He is alert and oriented to person, place, and time.   Psychiatric:         Behavior: Behavior normal.         Thought Content: Thought content normal.         Judgment: Judgment normal.          Result Review    Result Review:  I have personally reviewed the results from the time of this admission to 4/14/2025 16:53 EDT and agree with these findings:  []  Laboratory list / accordion  []  Microbiology  []  Radiology  []  EKG/Telemetry   []  Cardiology/Vascular   []  Pathology  []  Old records  []  Other:  Most notable findings include: sinus rhythm      Assessment & Plan   Assessment / Plan     Brief Patient Summary:  Carlos Manuel Sanchez is a 64 y.o. male with symptomatic persistent atrial fibrillation and prior PCI    Active Hospital Problems:  Active Hospital Problems    Diagnosis     **Atrial fibrillation, persistent      Plan:   Proceed with ablation    Haroldo Shelby MD

## 2025-04-14 NOTE — DISCHARGE INSTRUCTIONS
[facility]  [address] [place] [address]    Dr. Shelby's Discharge Instructions for Atrial Fibrillation or Atrial Flutter     Refer to this sheet in the next few weeks. These instructions provide you with information on caring for yourself after your procedure.     Make arrangements to have someone stay with you for 24 hours following the procedure.  This is due to the sedation you received and for your safety in the event of any complications.      About your Procedure:  You have had a procedure called a catheter ablation.  It is used to treat abnormal heartbeats (arrhythmia). The procedure destroyed (ablated) the cells in your heart that were causing your heart rhythm problem. During the procedure, the healthcare provider placed a thin, flexible wire (catheter) into a blood vessel in your groin.  The provider then threaded the catheter to your heart and destroyed the problematic cells.      Goal of Procedure:  The goal of this procedure is to regain a normal heart rhythm (sinus rhythm) and reduce the symptoms associated with your irregular heart rhythm. In many cases, one ablation is enough to treat the arrythmia, but sometimes the problem returns, and a second ablation may be necessary.      What to Expect After the Procedure:  After your procedure, it is typical to have the following sensations:  Minor discomfort or soreness in the chest or a small bump at the insertion site (groin). The bump should usually decrease in size and tenderness within 1 to 2 weeks.  Mild bruising which will usually fade within 2 to 4 weeks.  A mild sore throat  Sometimes the irregular heartbeat goes away immediately after the procedure.  Other times, it may take longer.  As your heart heals, it is common to have some Atrial Fibrillation symptoms and you may even go into atrial fibrillation up to a few months after the procedure.    Do not miss a dose of your blood thinner (Apixaban/Eliquis,  rivaroxaban/Xarelto, Warfarin/Coumadin).   You will have a follow up appointment in approximately 1 month.      Home Care Instructions:  Take your medications exactly as directed.  Do not skip doses unless directed to do so by your healthcare provider.   You should be able to return to most of your normal activities in the next 1-2 days. These include walking, climbing stairs, and doing household chores.   You may remove the dressings in your groin in 24 hours.    You may then shower and gently wash the area with plain soap and water after removing the dressings.     Do not apply powder or lotion to the site.  Do not take baths, swim, or use a hot tub until your health care provider approves and the site is completely healed.  Hold direct pressure to your groin sites any time you laugh, cough, sneeze or change positions.  Do this for the next 48 hours.   Do not bend, squat, or lift anything over 20 lb. (9 kg) for 7 days after your ablation. Do not do any other heavy physical activity for 7 days.  This allows your body time to heal properly.    Inspect the groin sites daily for signs of infection for 7 days. Those signs include: redness, swelling, drainage, or warmth at the groin sites.     Follow instructions about when you can drive or return to work as directed by your physician.    If you experience bleeding or swelling (larger than the size of a golf ball) at the groin sites after you go home, do not remove the dressing. Lie down flat and hold pressure over the sites for at last 10-15 minutes. You or the person with you should call the office at [phone] for further instructions. If the bleeding does not stop after 10-15 minutes, call 131 and continue holding pressure.  You need to come to the emergency room for evaluation.     Call Your Doctor If:  You experience the symptoms you had before the procedure for more than 24 hours. You have drainage (other than a small amount of blood on the dressing).  You  have chills or a fever > 101.  You have redness, warmth, swelling (larger than a walnut), drainage or severe pain at the insertion site  You develop chest pain or shortness of breath, feel faint, or pass out.  You develop pain, discoloration, coldness, numbness, tingling, or severe bruising in the leg that held the catheter.  You develop bleeding from any other place, such as the bowels.  You have difficulty swallowing, excessive pain when swallowing, difficulty breathing or vomiting of blood  You have unstable vital signs such as blood pressure less than 90/60 or a heart rate greater than 130 beats per minute for more than 1 hour.   You have any symptoms of a stroke.  Remember BE FAST  B-balance. Sudden trouble walking or loss of balance.  E-eyes.  Sudden changes in how you see or a sudden onset of a very bad headache.   F-face. Sudden weakness or loss of feeling of the face or facial droop on one side.   A-arms Sudden weakness or numbness in one arm.  One arm drifts down if they are both held out in front of you. This happens suddenly and usually on one side of the body.  S-speech.  Sudden trouble speaking, slurred speech or trouble understanding what people are saying.   T-time  Time to call emergency services.  Write down the symptoms and the time they started.

## 2025-04-14 NOTE — Clinical Note
Hemostasis started on the left femoral vein. Perclose was used in achieving hemostasis. Closure device deployed in the vessel and manual pressure applied to vessel. Manual pressure was held by Dr Shelby. Manual pressure was held for 3 min. Hemostasis achieved successfully. Closure device additional comment: Perclose #2, pressure held, gauze and tegaderm

## 2025-04-14 NOTE — Clinical Note
The DP pulses are +1 bilaterally. The PT pulses are +1 bilaterally. NILDA heels dry, intact, and elevated; cocoon warmer on at 46°C.

## 2025-04-14 NOTE — Clinical Note
with ultrasound guidance into the right femoral vein. Sheath insertion delayed. MD pre-closing with Perclose at first access site

## 2025-04-14 NOTE — Clinical Note
Hemostasis started on the right femoral vein. Perclose and figure 8 suturing was used in achieving hemostasis. Closure device deployed in the vessel and manual pressure applied to vessel. Manual pressure was held by Dr Shelby/MARIA LUISA Lew RTR. Manual pressure was held for 10 min. Hemostasis achieved successfully. Closure device additional comment: Perclose #1, sutured, pressure held, gauze and tegaderm

## 2025-04-14 NOTE — ANESTHESIA PROCEDURE NOTES
Airway  Reason: elective    Date/Time: 4/14/2025 10:12 AM  Airway not difficult    General Information and Staff    Patient location during procedure: OR  Anesthesiologist: Ascencion Cates MD  CRNA/CAA: Dipak Ledezma CRNA    Indications and Patient Condition  Indications for airway management: airway protection    Preoxygenated: yes  MILS maintained throughout    Mask difficulty assessment: 2 - vent by mask + OA or adjuvant +/- NMBA    Final Airway Details    Final airway type: endotracheal airway      Successful airway: ETT  Cuffed: yes   Successful intubation technique: direct laryngoscopy  Adjuncts used in placement: Bougie  Endotracheal tube insertion site: oral  Blade: Tyra  Blade size: 4  ETT size (mm): 7.5  Cormack-Lehane Classification: grade IIb - view of arytenoids or posterior of glottis only  Placement verified by: chest auscultation and capnometry   Measured from: teeth  ETT/EBT  to teeth (cm): 23  Number of attempts at approach: 1  Assessment: lips, teeth, and gum same as pre-op and atraumatic intubation    Additional Comments  Large beard makes mask ventilation more difficult but not impossible  DL 2b with bougie

## 2025-04-15 ENCOUNTER — TELEPHONE (OUTPATIENT)
Dept: CARDIOLOGY | Facility: CLINIC | Age: 65
End: 2025-04-15
Payer: OTHER GOVERNMENT

## 2025-04-15 ENCOUNTER — TELEPHONE (OUTPATIENT)
Dept: CARDIOLOGY | Age: 65
End: 2025-04-15

## 2025-04-15 DIAGNOSIS — E78.5 HYPERLIPIDEMIA LDL GOAL <70: ICD-10-CM

## 2025-04-15 RX ORDER — ROSUVASTATIN CALCIUM 20 MG/1
20 TABLET, COATED ORAL DAILY
Qty: 30 TABLET | Refills: 0 | Status: SHIPPED | OUTPATIENT
Start: 2025-04-15

## 2025-04-15 NOTE — TELEPHONE ENCOUNTER
Caller: Carlos Manuel Sanchez    Relationship to patient: Self    Best call back number: [phone]    New or established patient?  [] New  [x] Established    Date of discharge: 04.14.2025    Facility discharged from: [facility]    Diagnosis/Symptoms: Atrial fibrillation, persistent     Length of stay (If applicable): 7 HOURS    Specialty Only: Did you see a [facility] provider?    [x] Yes  [] No  If so, who? DR. MCKEON    Additional Details: PT NEEDS HOSPITAL FOLLOW UP. NO TIMEFRAME IN DISCHARGE NOTES. PT HAS  INSURANCE AND WOULD LIKE TO BE SEEN BEFORE 05.15.2025. WILL NEED A NEW REFERRAL AFTER THAT DATE.

## 2025-04-15 NOTE — TELEPHONE ENCOUNTER
Patient left a message stating that he has yet to receive his Rosuvastatin from [facility] mail order, he wants to know if we can send in a partial to a local pharmacy until he gets his mail order.

## 2025-05-02 ENCOUNTER — TELEPHONE (OUTPATIENT)
Dept: FAMILY MEDICINE CLINIC | Facility: CLINIC | Age: 65
End: 2025-05-02
Payer: OTHER GOVERNMENT

## 2025-05-02 DIAGNOSIS — I48.19 ATRIAL FIBRILLATION, PERSISTENT: Primary | ICD-10-CM

## 2025-05-02 DIAGNOSIS — E78.00 PURE HYPERCHOLESTEROLEMIA: ICD-10-CM

## 2025-05-02 DIAGNOSIS — I10 PRIMARY HYPERTENSION: ICD-10-CM

## 2025-05-02 DIAGNOSIS — I10 ESSENTIAL HYPERTENSION: ICD-10-CM

## 2025-05-02 DIAGNOSIS — Z51.81 MEDICATION MONITORING ENCOUNTER: ICD-10-CM

## 2025-05-02 DIAGNOSIS — Z01.810 PRE-OPERATIVE CARDIOVASCULAR EXAMINATION: ICD-10-CM

## 2025-05-02 NOTE — TELEPHONE ENCOUNTER
Caller: Carlos Manuel Sanchez    Relationship to patient: Self    Best call back number: [phone]    Patient is needing: PATIENT CALLED IN AND IS REQUESTING LAB ORDERS BE PLACED AHEAD OF HIS UPCOMING APPOINTMENT IN JUNE. PATIENT SAID IT IS OKAY TO LEAVE MESSAGE ON PHONE.      
Statement Selected

## 2025-05-12 ENCOUNTER — OFFICE VISIT (OUTPATIENT)
Age: 65
End: 2025-05-12
Payer: OTHER GOVERNMENT

## 2025-05-12 VITALS
HEART RATE: 59 BPM | DIASTOLIC BLOOD PRESSURE: 80 MMHG | SYSTOLIC BLOOD PRESSURE: 124 MMHG | HEIGHT: 70 IN | BODY MASS INDEX: 29.49 KG/M2 | WEIGHT: 206 LBS

## 2025-05-12 DIAGNOSIS — I48.19 ATRIAL FIBRILLATION, PERSISTENT: Primary | ICD-10-CM

## 2025-05-12 DIAGNOSIS — I10 PRIMARY HYPERTENSION: ICD-10-CM

## 2025-05-12 PROCEDURE — 93000 ELECTROCARDIOGRAM COMPLETE: CPT

## 2025-05-12 PROCEDURE — 99213 OFFICE O/P EST LOW 20 MIN: CPT

## 2025-05-12 NOTE — PROGRESS NOTES
Date of Office Visit: 2025  Encounter Provider: ENRIQUE Cohen  Place of Service: [facility] CARDIOLOGY  Patient Name: Carlos Manuel Sanchez  :1960    Chief Complaint   Patient presents with    persistent AFIB    s/p ablation    :     HPI: Carlos Manuel Sanchez is a 64 y.o. male who follows in [facility], referred to Dr. Shelby for persistent atrial fibrillation.     He was diagnosed with atrial fibrillation in May 2023.    He continued to have symptoms, palpitations, fatigue and shortness of breath.    Ended up going into persistent A-fib in 2024 and required cardioversion.  He was on amiodarone but did not wish to take long-term.    Saw Dr. Shelby in 2020 for like to proceed with ablation.    He underwent PVI with PFA with Dr. Shelby in 2025.                  He presents today for follow-up appointment.    Since his ablation he has been doing very well.    He has not had any recurrent episodes of atrial fibrillation.  His symptoms of palpitations and fatigue is significantly improved.    He has been off amiodarone for several months.    EKG shows sinus rhythm.    He is on low-dose metoprolol.    Apixaban for AC.  His [id number] is 1.          Past Medical History:   Diagnosis Date    Arrhythmia     Arthritis     Atrial fibrillation     Borderline diabetic     Colon polyp 10/18/2021    Coronary artery disease     BLOCKAGE 1 STENT PLACED , SEE'S DR WARD, DENIED CP/SOA    Diabetes mellitus 2/15/24    HTN (hypertension)     Hyperlipemia     Rotator cuff syndrome 2022    When pulling back compound bow during hunting season    Rotator cuff tear, right        Past Surgical History:   Procedure Laterality Date    APPENDECTOMY      CARDIAC ELECTROPHYSIOLOGY PROCEDURE N/A 2025    Procedure: Ablation atrial fibrillation PFA;  Surgeon: Haroldo Shelby MD;  Location: [facility] INVASIVE LOCATION;  Service: Cardiovascular;  Laterality: N/A;     COLONOSCOPY N/A 10/18/2021    Procedure: COLONOSCOPY with hot snare polypectomies, clips applied x2;  Surgeon: Haroldo Perez MD;  Location: [facility] ENDOSCOPY;  Service: Gastroenterology;  Laterality: N/A;  cecum polyp, transverse polyp    COLONOSCOPY N/A 12/11/2024    Procedure: COLONOSCOPY;  Surgeon: Haroldo Perez MD;  Location: [facility] ENDOSCOPY;  Service: Gastroenterology;  Laterality: N/A;  diverticulosis    CORONARY STENT PLACEMENT  2000 1 STENT PLACED    EXCISION LESION Right 02/25/2022    Procedure: excision of subcutaneous mass from right arm;  Surgeon: Jeff Tomas MD;  Location: [facility] MAIN OR;  Service: General;  Laterality: Right;    KNEE ARTHROSCOPY W/ MENISCECTOMY Left 1/3/2024    Procedure: LEFT KNEE ARTHROSCOPY WITH PARTIAL MEDIAL MENISCECTOMY CHONDROPLASTY;  Surgeon: Lisa James MD;  [place] [facility] [place] [facility]  Service: Orthopedics;  Laterality: Left;    ROTATOR CUFF REPAIR Left     SHOULDER ARTHROSCOPY W/ ROTATOR CUFF REPAIR Right 01/25/2023    Procedure: SHOULDER ARTHROSCOPY WITH ROTATOR CUFF REPAIR, SUBCROMIAL DECOMPRESSION,DISTAL CLAVICLE RESECTION;  Surgeon: Lisa James MD;  [place] [facility] [place] [facility];  Service: Orthopedics;  Laterality: Right;    SHOULDER SURGERY  01/25/2023    Right shoulder    TOTAL KNEE ARTHROPLASTY Left 1/17/2025    Procedure: LEFT TOTAL KNEE ARTHROPLASTY: Replace left knee with artificial implants;  Surgeon: Lisa James MD;  Location: [facility] MAIN OR;  Service: Orthopedics;  Laterality: Left;       Social History     Socioeconomic History    Marital status:    Tobacco Use    Smoking status: Never     Passive exposure: Past    Smokeless tobacco: Never    Tobacco comments:     Never use   Vaping Use    Vaping status: Never Used   Substance and Sexual Activity    Alcohol use: Yes     Alcohol/week: 6.0 standard drinks of alcohol     Types: 6 Cans of beer per week    Drug use: Never    Sexual activity: Defer     Partners: Female     Birth  control/protection: Vasectomy       Family History   Problem Relation Age of Onset    Heart disease Mother     Heart disease Father     Cancer Father         Lung cancer    Heart disease Sister     Heart disease Brother     Lung cancer Other     Malig Hyperthermia Neg Hx     Colon cancer Neg Hx        Review of Systems   Constitutional: Negative for chills, fever and malaise/fatigue.   Cardiovascular:  Negative for chest pain, dyspnea on exertion, leg swelling, near-syncope, orthopnea, palpitations, paroxysmal nocturnal dyspnea and syncope.   Respiratory:  Negative for cough and shortness of breath.    Hematologic/Lymphatic: Negative.    Musculoskeletal:  Negative for joint pain, joint swelling and myalgias.   Gastrointestinal:  Negative for abdominal pain, diarrhea, melena, nausea and vomiting.   Genitourinary:  Negative for frequency and hematuria.   Neurological:  Negative for light-headedness, numbness, paresthesias and seizures.   Allergic/Immunologic: Negative.    All other systems reviewed and are negative.      No Known Allergies      Current Outpatient Medications:     amoxicillin (AMOXIL) 500 MG capsule, Take 4 capsules by mouth 1 hour prior to teeth cleaning/dental procedure, Disp: 4 capsule, Rfl: 0    apixaban (ELIQUIS) 5 MG tablet tablet, Take 1 tablet by mouth 2 (Two) Times a Day., Disp: 180 tablet, Rfl: 3    aspirin 81 MG EC tablet, Take 1 tablet by mouth Daily. LAST DOSE 12/26/23 PER DR WARD, Disp: , Rfl:     B Complex-Biotin-FA (B Complete) tablet, Take 1 tablet by mouth Daily., Disp: , Rfl:     lisinopril (PRINIVIL,ZESTRIL) 20 MG tablet, TAKE 1 TABLET DAILY (Patient taking differently: Take 1 tablet by mouth Daily.), Disp: 90 tablet, Rfl: 3    Repatha SureClick solution auto-injector SureClick injection, INJECT 1 ML UNDER THE SKIN INTO THE APPROPRIATE AREA EVERY 14 DAYS AS DIRECTED, Disp: 6 mL, Rfl: 3    rosuvastatin (CRESTOR) 20 MG tablet, Take 1 tablet by mouth Daily., Disp: 30 tablet, Rfl: 0     "sildenafil (VIAGRA) 100 MG tablet, Take 1 tablet by mouth Daily As Needed., Disp: , Rfl:       Objective:     Vitals:    05/12/25 0941   BP: 124/80   BP Location: Left arm   Patient Position: Sitting   Pulse: 59   Weight: 93.4 kg (206 lb)   Height: 177.8 cm (70\")     Body mass index is 29.56 kg/m².    PHYSICAL EXAM:    Vitals Reviewed.   General Appearance: No acute distress, well developed and well nourished.   Respiratory: No signs of respiratory distress. Respiration rhythm and depth normal.   Cardiovascular:  Heart Rate and Rhythm: Normal  Skin: Warm and dry.   Psychiatric: Patient alert and oriented to person, place, and time. Speech and behavior appropriate. Normal mood and affect.       ECG 12 Lead    Date/Time: 5/12/2025 2:15 PM  Performed by: Andrzej Zuluaga APRN    Authorized by: Andrzej Zuluaga APRN  Comparison: compared with previous ECG   Similar to previous ECG  Rhythm: sinus rhythm            Assessment:       Diagnosis Plan   1. Atrial fibrillation, persistent        2. Primary hypertension               Plan:   1.  Persistent atrial fibrillation--- status post PVI----he is doing great since his ablation.  He has been able to come off of amiodarone, he has been off of this for several months without any recurrent A-fib.  His symptoms have significantly improved.  I recommended he continue metoprolol for now.  His [id number] is 1, he is going to continue apixaban through the acute phase of his ablation but he would like to consider discontinuing.    2.  Hypertension--- currently well-controlled.            He will follow-up in 3 months or sooner if he has any issues.          I spent at least 30 minutes reviewing previous notes, labs, EKGs, device reports and/or time with the patient.         As always, it has been a pleasure to participate in your patient's care.      Sincerely,         ENRIQUE Cohen   "

## 2025-05-21 DIAGNOSIS — I48.19 ATRIAL FIBRILLATION, PERSISTENT: ICD-10-CM

## 2025-05-21 RX ORDER — APIXABAN 5 MG/1
5 TABLET, FILM COATED ORAL 2 TIMES DAILY
Qty: 180 TABLET | Refills: 3 | Status: SHIPPED | OUTPATIENT
Start: 2025-05-21

## 2025-05-22 NOTE — TELEPHONE ENCOUNTER
Rx Refill Note  Requested Prescriptions     Pending Prescriptions Disp Refills    metoprolol succinate XL (TOPROL-XL) 50 MG 24 hr tablet [Pharmacy Med Name: METOPROLOL SUCCINATE ER TABS 50MG] 90 tablet 3     Sig: TAKE 1 TABLET DAILY        LAST OFFICE VISIT:  12/05/2024     NEXT OFFICE VISIT:  6/5/2025     Does the medication requests match the last office note:    [] Yes   [x] No    Does this refill request meet protocol details for MA to approve:     [] Yes   [x] No   [] No Protocols Provided   Active on medication list   The original prescription was discontinued on 5/12/2025 by Gina River MA for the following reason: *Therapy completed.     PLEASE SEE OFFICE NOTE FROM ANOTHER CARDIOLOGIST 05/12/2025 THAT SAYS FOR PT TO continue metoprolol for now.

## 2025-05-23 RX ORDER — METOPROLOL SUCCINATE 50 MG/1
50 TABLET, EXTENDED RELEASE ORAL DAILY
Qty: 90 TABLET | Refills: 3 | Status: SHIPPED | OUTPATIENT
Start: 2025-05-23

## 2025-05-30 ENCOUNTER — CLINICAL SUPPORT (OUTPATIENT)
Dept: FAMILY MEDICINE CLINIC | Facility: CLINIC | Age: 65
End: 2025-05-30
Payer: OTHER GOVERNMENT

## 2025-05-30 DIAGNOSIS — E78.00 PURE HYPERCHOLESTEROLEMIA: ICD-10-CM

## 2025-05-30 DIAGNOSIS — Z51.81 MEDICATION MONITORING ENCOUNTER: ICD-10-CM

## 2025-05-30 DIAGNOSIS — I48.19 ATRIAL FIBRILLATION, PERSISTENT: ICD-10-CM

## 2025-05-30 DIAGNOSIS — I10 PRIMARY HYPERTENSION: ICD-10-CM

## 2025-05-30 DIAGNOSIS — I10 ESSENTIAL HYPERTENSION: ICD-10-CM

## 2025-05-30 LAB
ALBUMIN SERPL-MCNC: 4 G/DL (ref 3.5–5.2)
ALBUMIN/GLOB SERPL: 1.3 G/DL
ALP SERPL-CCNC: 97 U/L (ref 39–117)
ALT SERPL W P-5'-P-CCNC: 34 U/L (ref 1–41)
ANION GAP SERPL CALCULATED.3IONS-SCNC: 11.6 MMOL/L (ref 5–15)
AST SERPL-CCNC: 31 U/L (ref 1–40)
BASOPHILS # BLD AUTO: 0.03 10*3/MM3 (ref 0–0.2)
BASOPHILS NFR BLD AUTO: 0.6 % (ref 0–1.5)
BILIRUB SERPL-MCNC: 0.4 MG/DL (ref 0–1.2)
BUN SERPL-MCNC: 12 MG/DL (ref 8–23)
BUN/CREAT SERPL: 15.6 (ref 7–25)
CALCIUM SPEC-SCNC: 9.3 MG/DL (ref 8.6–10.5)
CHLORIDE SERPL-SCNC: 99 MMOL/L (ref 98–107)
CHOLEST SERPL-MCNC: 216 MG/DL (ref 0–200)
CO2 SERPL-SCNC: 24.4 MMOL/L (ref 22–29)
CREAT SERPL-MCNC: 0.77 MG/DL (ref 0.76–1.27)
DEPRECATED RDW RBC AUTO: 43 FL (ref 37–54)
EGFRCR SERPLBLD CKD-EPI 2021: 100.6 ML/MIN/1.73
EOSINOPHIL # BLD AUTO: 0.06 10*3/MM3 (ref 0–0.4)
EOSINOPHIL NFR BLD AUTO: 1.3 % (ref 0.3–6.2)
ERYTHROCYTE [DISTWIDTH] IN BLOOD BY AUTOMATED COUNT: 13.3 % (ref 12.3–15.4)
GLOBULIN UR ELPH-MCNC: 3.1 GM/DL
GLUCOSE SERPL-MCNC: 118 MG/DL (ref 65–99)
HBA1C MFR BLD: 5.9 % (ref 4.8–5.6)
HCT VFR BLD AUTO: 49.5 % (ref 37.5–51)
HDLC SERPL-MCNC: 104 MG/DL (ref 40–60)
HGB BLD-MCNC: 16.2 G/DL (ref 13–17.7)
IMM GRANULOCYTES # BLD AUTO: 0.04 10*3/MM3 (ref 0–0.05)
IMM GRANULOCYTES NFR BLD AUTO: 0.8 % (ref 0–0.5)
LDLC SERPL CALC-MCNC: 102 MG/DL (ref 0–100)
LDLC/HDLC SERPL: 0.97 {RATIO}
LYMPHOCYTES # BLD AUTO: 0.81 10*3/MM3 (ref 0.7–3.1)
LYMPHOCYTES NFR BLD AUTO: 16.9 % (ref 19.6–45.3)
MCH RBC QN AUTO: 29 PG (ref 26.6–33)
MCHC RBC AUTO-ENTMCNC: 32.7 G/DL (ref 31.5–35.7)
MCV RBC AUTO: 88.7 FL (ref 79–97)
MONOCYTES # BLD AUTO: 0.7 10*3/MM3 (ref 0.1–0.9)
MONOCYTES NFR BLD AUTO: 14.6 % (ref 5–12)
NEUTROPHILS NFR BLD AUTO: 3.15 10*3/MM3 (ref 1.7–7)
NEUTROPHILS NFR BLD AUTO: 65.8 % (ref 42.7–76)
NRBC BLD AUTO-RTO: 0 /100 WBC (ref 0–0.2)
PLATELET # BLD AUTO: 197 10*3/MM3 (ref 140–450)
PMV BLD AUTO: 9.4 FL (ref 6–12)
POTASSIUM SERPL-SCNC: 4.7 MMOL/L (ref 3.5–5.2)
PROT SERPL-MCNC: 7.1 G/DL (ref 6–8.5)
RBC # BLD AUTO: 5.58 10*6/MM3 (ref 4.14–5.8)
SODIUM SERPL-SCNC: 135 MMOL/L (ref 136–145)
T4 FREE SERPL-MCNC: 1.14 NG/DL (ref 0.92–1.68)
TRIGL SERPL-MCNC: 56 MG/DL (ref 0–150)
TSH SERPL DL<=0.05 MIU/L-ACNC: 2.78 UIU/ML (ref 0.27–4.2)
VLDLC SERPL-MCNC: 10 MG/DL (ref 5–40)
WBC NRBC COR # BLD AUTO: 4.79 10*3/MM3 (ref 3.4–10.8)

## 2025-05-30 PROCEDURE — 80061 LIPID PANEL: CPT | Performed by: FAMILY MEDICINE

## 2025-05-30 PROCEDURE — 84443 ASSAY THYROID STIM HORMONE: CPT | Performed by: FAMILY MEDICINE

## 2025-05-30 PROCEDURE — 36415 COLL VENOUS BLD VENIPUNCTURE: CPT | Performed by: FAMILY MEDICINE

## 2025-05-30 PROCEDURE — 80053 COMPREHEN METABOLIC PANEL: CPT | Performed by: FAMILY MEDICINE

## 2025-05-30 PROCEDURE — 84439 ASSAY OF FREE THYROXINE: CPT | Performed by: FAMILY MEDICINE

## 2025-05-30 PROCEDURE — 83036 HEMOGLOBIN GLYCOSYLATED A1C: CPT | Performed by: FAMILY MEDICINE

## 2025-05-30 PROCEDURE — 85025 COMPLETE CBC W/AUTO DIFF WBC: CPT | Performed by: FAMILY MEDICINE

## 2025-06-05 ENCOUNTER — OFFICE VISIT (OUTPATIENT)
Dept: CARDIOLOGY | Facility: CLINIC | Age: 65
End: 2025-06-05
Payer: OTHER GOVERNMENT

## 2025-06-05 VITALS
HEART RATE: 58 BPM | SYSTOLIC BLOOD PRESSURE: 143 MMHG | HEIGHT: 70 IN | BODY MASS INDEX: 29.15 KG/M2 | DIASTOLIC BLOOD PRESSURE: 82 MMHG | WEIGHT: 203.6 LBS

## 2025-06-05 DIAGNOSIS — E78.5 HYPERLIPIDEMIA LDL GOAL <70: ICD-10-CM

## 2025-06-05 DIAGNOSIS — I25.10 CAD S/P PERCUTANEOUS CORONARY ANGIOPLASTY: Primary | ICD-10-CM

## 2025-06-05 DIAGNOSIS — I10 PRIMARY HYPERTENSION: ICD-10-CM

## 2025-06-05 DIAGNOSIS — Z98.61 CAD S/P PERCUTANEOUS CORONARY ANGIOPLASTY: Primary | ICD-10-CM

## 2025-06-05 DIAGNOSIS — I48.19 ATRIAL FIBRILLATION, PERSISTENT: ICD-10-CM

## 2025-06-05 PROCEDURE — 99214 OFFICE O/P EST MOD 30 MIN: CPT | Performed by: NURSE PRACTITIONER

## 2025-06-05 NOTE — PROGRESS NOTES
Chief Complaint  Follow-up, Atrial Fibrillation, Hyperlipidemia, Hypertension, and Coronary Artery Disease    Subjective            History of Present Illness  Carlos Manuel Sanchez is a 64-year-old male patient who presents to the office today for follow-up.    History of Present Illness  The patient presents for follow-up of coronary artery disease, atrial fibrillation, blood pressure and cholesterol management.    He has a history of coronary artery disease, for which he underwent stenting in the past. He is currently on a daily regimen of aspirin.    He has not experienced any recurrence of atrial fibrillation since his ablation therapy on 04/14/2025. He is currently on a half tablet of metoprolol 50 mg once daily, which he tolerates well. There are discussions about potentially discontinuing Eliquis.    His total cholesterol level was 218, which he attributes to dietary indulgence during the [facility] holiday. He has since made significant dietary changes, including reducing his sugar and salt intake. He is on rosuvastatin 20 mg daily and Repatha injections.    He is on lisinopril 20 mg daily for blood pressure management.    FAMILY HISTORY  His sister was on Repatha for about 4-5 years, but it suddenly stopped working for her and her cholesterol levels increased significantly.    MEDICATIONS  Current: Metoprolol, lisinopril, rosuvastatin, Repatha, aspirin, Eliquis.  Discontinued: Amiodarone.        PMH  Past Medical History:   Diagnosis Date    Arrhythmia     Arthritis     Atrial fibrillation     Borderline diabetic     Colon polyp 10/18/2021    Coronary artery disease     BLOCKAGE 1 STENT PLACED 2000, SEE'S DR WARD, DENIED CP/SOA    Diabetes mellitus 2/15/24    HTN (hypertension)     Hyperlipemia     Rotator cuff syndrome 09/2022    When pulling back compound bow during hunting season    Rotator cuff tear, right          ALLERGY  No Known Allergies       SURGICALHX  Past Surgical History:   Procedure Laterality  Date    APPENDECTOMY      CARDIAC ABLATION      CARDIAC ELECTROPHYSIOLOGY PROCEDURE N/A 04/14/2025    Procedure: Ablation atrial fibrillation PFA;  Surgeon: Haroldo Shelby MD;  Location: [facility] INVASIVE LOCATION;  Service: Cardiovascular;  Laterality: N/A;    COLONOSCOPY N/A 10/18/2021    Procedure: COLONOSCOPY with hot snare polypectomies, clips applied x2;  Surgeon: Haroldo Perez MD;  Location: [facility] ENDOSCOPY;  Service: Gastroenterology;  Laterality: N/A;  cecum polyp, transverse polyp    COLONOSCOPY N/A 12/11/2024    Procedure: COLONOSCOPY;  Surgeon: Haroldo Perez MD;  Location: [facility] ENDOSCOPY;  Service: Gastroenterology;  Laterality: N/A;  diverticulosis    CORONARY STENT PLACEMENT  2000 1 STENT PLACED    EXCISION LESION Right 02/25/2022    Procedure: excision of subcutaneous mass from right arm;  Surgeon: Jeff Tomas MD;  Location: [facility] MAIN OR;  Service: General;  Laterality: Right;    KNEE ARTHROSCOPY W/ MENISCECTOMY Left 01/03/2024    Procedure: LEFT KNEE ARTHROSCOPY WITH PARTIAL MEDIAL MENISCECTOMY CHONDROPLASTY;  Surgeon: Lisa James MD;  Location: [facility] OR OSC;  Service: Orthopedics;  Laterality: Left;    ROTATOR CUFF REPAIR Left     SHOULDER ARTHROSCOPY W/ ROTATOR CUFF REPAIR Right 01/25/2023    Procedure: SHOULDER ARTHROSCOPY WITH ROTATOR CUFF REPAIR, SUBCROMIAL DECOMPRESSION,DISTAL CLAVICLE RESECTION;  Surgeon: Lisa James MD;  Location: [facility] OR OSC;  Service: Orthopedics;  Laterality: Right;    SHOULDER SURGERY  01/25/2023    Right shoulder    TOTAL KNEE ARTHROPLASTY Left 01/17/2025    Procedure: LEFT TOTAL KNEE ARTHROPLASTY: Replace left knee with artificial implants;  Surgeon: Lisa James MD;  Location: [facility] MAIN OR;  Service: Orthopedics;  Laterality: Left;          SOC  Social History     Socioeconomic History    Marital status:    Tobacco Use    Smoking status: Never     Passive exposure: Past    Smokeless tobacco: Never     "Tobacco comments:     Never use   Vaping Use    Vaping status: Never Used   Substance and Sexual Activity    Alcohol use: Yes     Alcohol/week: 6.0 standard drinks of alcohol     Types: 6 Cans of beer per week    Drug use: Never    Sexual activity: Defer     Partners: Female     Birth control/protection: Vasectomy         FAMHX  Family History   Problem Relation Age of Onset    Heart disease Mother     Heart disease Father     Cancer Father         Lung cancer    Heart disease Sister     Heart disease Brother     Lung cancer Other     Malig Hyperthermia Neg Hx     Colon cancer Neg Hx           MEDSIGONLY  Current Outpatient Medications on File Prior to Visit   Medication Sig    aspirin 81 MG EC tablet Take 1 tablet by mouth Daily. LAST DOSE 12/26/23 PER DR ED BARKER Complex-Biotin-FA (B Complete) tablet Take 1 tablet by mouth Daily.    Eliquis 5 MG tablet tablet TAKE 1 TABLET TWICE A DAY    lisinopril (PRINIVIL,ZESTRIL) 20 MG tablet TAKE 1 TABLET DAILY (Patient taking differently: Take 1 tablet by mouth Daily.)    metoprolol succinate XL (TOPROL-XL) 50 MG 24 hr tablet TAKE 1 TABLET DAILY (Patient taking differently: Take 1 tablet by mouth Daily. 1/2 tablet by mouth daily)    Repatha SureClick solution auto-injector SureClick injection INJECT 1 ML UNDER THE SKIN INTO THE APPROPRIATE AREA EVERY 14 DAYS AS DIRECTED    rosuvastatin (CRESTOR) 20 MG tablet Take 1 tablet by mouth Daily.    sildenafil (VIAGRA) 100 MG tablet Take 1 tablet by mouth Daily As Needed.    [DISCONTINUED] amoxicillin (AMOXIL) 500 MG capsule Take 4 capsules by mouth 1 hour prior to teeth cleaning/dental procedure (Patient not taking: Reported on 6/5/2025)     No current facility-administered medications on file prior to visit.         Objective   /82   Pulse 58   Ht 177.8 cm (70\")   Wt 92.4 kg (203 lb 9.6 oz)   BMI 29.21 kg/m²       Physical Exam  HENT:      Head: Normocephalic.   Neck:      Vascular: No carotid bruit.   Cardiovascular:    " "  Rate and Rhythm: Normal rate and regular rhythm.      Pulses: Normal pulses.      Heart sounds: Normal heart sounds. No murmur heard.  Pulmonary:      Effort: Pulmonary effort is normal.      Breath sounds: Normal breath sounds.   Musculoskeletal:      Cervical back: Neck supple.      Right lower leg: No edema.      Left lower leg: No edema.   Skin:     General: Skin is dry.   Neurological:      Mental Status: He is alert and oriented to person, place, and time.   Psychiatric:         Behavior: Behavior normal.           Result Review :   The following data was reviewed by: ENRIQUE Lees on 06/05/2025:  proBNP   Date Value Ref Range Status   04/28/2023 381.5 0.0 - 900.0 pg/mL Final     CMP          1/18/2025    04:17 4/8/2025    08:45 5/30/2025    08:18   CMP   Glucose 210  119  118    BUN 19  14  12.0    Creatinine 1.07  1.25  0.77    EGFR 78.0  64.7  100.6    Sodium 128  136  135    Potassium 5.0  4.9  4.7    Chloride 96  102  99    Calcium 8.2  9.3  9.3    Total Protein   7.1    Albumin   4.0    Globulin   3.1    Total Bilirubin   0.4    Alkaline Phosphatase   97    AST (SGOT)   31    ALT (SGPT)   34    Albumin/Globulin Ratio   1.3    BUN/Creatinine Ratio 17.8  11.2  15.6    Anion Gap 8.8  8.2  11.6      CBC w/diff          1/18/2025    04:17 4/8/2025    08:45 5/30/2025    08:18   CBC w/Diff   WBC 12.15  4.05  4.79    RBC 4.03  5.40  5.58    Hemoglobin 12.2  15.8  16.2    Hematocrit 36.7  48.5  49.5    MCV 91.1  89.8  88.7    MCH 30.3  29.3  29.0    MCHC 33.2  32.6  32.7    RDW 11.8  12.1  13.3    Platelets 153  198  197    Neutrophil Rel %   65.8    Immature Granulocyte Rel %   0.8    Lymphocyte Rel %   16.9    Monocyte Rel %   14.6    Eosinophil Rel %   1.3    Basophil Rel %   0.6       Lab Results   Component Value Date    TSH 2.780 05/30/2025      Lab Results   Component Value Date    FREET4 1.14 05/30/2025      No results found for: \"DDIMERQUANT\"  Magnesium   Date Value Ref Range Status " "  04/28/2023 2.0 1.6 - 2.4 mg/dL Final      No results found for: \"DIGOXIN\"   Lab Results   Component Value Date    TROPONINT 13 04/28/2023          Lab 05/30/25  0818   HEMOGLOBIN A1C 5.90*      Lipid Panel          7/11/2024    08:06 5/30/2025    08:18   Lipid Panel   Total Cholesterol 170  216    Triglycerides 54  56    HDL Cholesterol 97  104    VLDL Cholesterol 11  10    LDL Cholesterol  62  102    LDL/HDL Ratio 0.64  0.97        Results for orders placed in visit on 05/03/23    Adult Transthoracic Echo Complete W/ Cont if Necessary Per Protocol    Interpretation Summary    The study is technically difficult for diagnosis.    Left ventricular systolic function is normal. Calculated left ventricular EF = 56%    Left ventricular diastolic function was normal.    The left atrial cavity is moderately dilated.    The right atrial cavity is mildly  dilated.    There is mild calcification of the aortic valve.    Estimated right ventricular systolic pressure from tricuspid regurgitation is normal (<35 mmHg).    The left atrial cavity is moderately dilated.           Assessment and Plan    Diagnoses and all orders for this visit:    1. CAD S/P percutaneous coronary angioplasty (Primary)    2. Atrial fibrillation, persistent    3. Primary hypertension    4. Hyperlipidemia LDL goal <70      Assessment & Plan  1. Coronary artery disease.  He is advised to continue with his daily aspirin regimen for coronary disease management.    2. Atrial fibrillation.  He has not had any recurrence of atrial fibrillation since the ablation therapy on 04/14/2025. He is currently being weaned off metoprolol and has already been taken off amiodarone. His [id number] score is low, indicating a minimal risk of stroke. The possibility of discontinuing Eliquis will be discussed with EP on 08/26/2025.     3. Blood pressure management.  He is advised to continue with his current medication regimen of lisinopril 20 mg daily.    4. Cholesterol " management.  His total cholesterol level has increased from 170 to 216, with a target goal of less than 200. Triglyceride levels remain stable, while HDL levels have increased from 97 to 104. However, his LDL levels have risen from 62 to 102. He is advised to continue with his current medication regimen of rosuvastatin 20 mg daily and Repatha injections. A repeat blood work will be conducted prior to his next visit. If the cholesterol levels remain elevated, a more aggressive treatment approach may be considered, potentially involving the use of Leqvio.    Follow-up  The patient will follow up in 6 months.    PROCEDURE  The patient underwent stenting in the past for coronary artery disease.    Ablation therapy was performed on 04/14/2025.      Follow Up   Return in about 6 months (around 12/5/2025) for Follow up with Dr Clements.    Patient was given instructions and counseling regarding his condition or for health maintenance advice. Please see specific information pulled into the AVS if appropriate.     Carlos Manuel Sanchez  reports that he has never smoked. He has been exposed to tobacco smoke. He has never used smokeless tobacco.         Patient or patient representative verbalized consent for the use of Ambient Listening during the visit with  ENRIQUE Lees for chart documentation. 6/17/2025  06:41 EDT    ENRIQUE Lees  06/05/25  08:27 EDT    Dictated Utilizing Dragon Dictation

## 2025-06-09 ENCOUNTER — TELEPHONE (OUTPATIENT)
Dept: FAMILY MEDICINE CLINIC | Facility: CLINIC | Age: 65
End: 2025-06-09

## 2025-06-09 ENCOUNTER — OFFICE VISIT (OUTPATIENT)
Dept: FAMILY MEDICINE CLINIC | Facility: CLINIC | Age: 65
End: 2025-06-09
Payer: OTHER GOVERNMENT

## 2025-06-09 VITALS
BODY MASS INDEX: 29.59 KG/M2 | OXYGEN SATURATION: 98 % | DIASTOLIC BLOOD PRESSURE: 78 MMHG | SYSTOLIC BLOOD PRESSURE: 136 MMHG | TEMPERATURE: 97.9 F | HEIGHT: 70 IN | WEIGHT: 206.7 LBS | HEART RATE: 50 BPM

## 2025-06-09 DIAGNOSIS — Z12.5 SCREENING FOR PROSTATE CANCER: ICD-10-CM

## 2025-06-09 DIAGNOSIS — I48.19 ATRIAL FIBRILLATION, PERSISTENT: ICD-10-CM

## 2025-06-09 DIAGNOSIS — Z98.890 S/P ABLATION OF ATRIAL FIBRILLATION: ICD-10-CM

## 2025-06-09 DIAGNOSIS — E78.00 PURE HYPERCHOLESTEROLEMIA: ICD-10-CM

## 2025-06-09 DIAGNOSIS — Z96.652 S/P TOTAL KNEE REPLACEMENT, LEFT: Primary | ICD-10-CM

## 2025-06-09 DIAGNOSIS — Z98.61 CAD S/P PERCUTANEOUS CORONARY ANGIOPLASTY: ICD-10-CM

## 2025-06-09 DIAGNOSIS — I25.10 CAD S/P PERCUTANEOUS CORONARY ANGIOPLASTY: ICD-10-CM

## 2025-06-09 DIAGNOSIS — E11.65 TYPE 2 DIABETES MELLITUS WITH HYPERGLYCEMIA, WITHOUT LONG-TERM CURRENT USE OF INSULIN: ICD-10-CM

## 2025-06-09 DIAGNOSIS — Z86.79 S/P ABLATION OF ATRIAL FIBRILLATION: ICD-10-CM

## 2025-06-09 DIAGNOSIS — Z23 NEED FOR TDAP VACCINATION: ICD-10-CM

## 2025-06-09 DIAGNOSIS — I10 ESSENTIAL HYPERTENSION: ICD-10-CM

## 2025-06-09 DIAGNOSIS — K57.90 DIVERTICULOSIS: ICD-10-CM

## 2025-06-09 PROCEDURE — 90715 TDAP VACCINE 7 YRS/> IM: CPT | Performed by: FAMILY MEDICINE

## 2025-06-09 PROCEDURE — 90471 IMMUNIZATION ADMIN: CPT | Performed by: FAMILY MEDICINE

## 2025-06-09 PROCEDURE — 99214 OFFICE O/P EST MOD 30 MIN: CPT | Performed by: FAMILY MEDICINE

## 2025-06-09 NOTE — PROGRESS NOTES
Chief Complaint  Hypertension/diabetes    Subjective          Carlos Manuel Sanchez presents to [facility]    History of Present Illness     History of Present Illness  The patient is a 64-year-old male who presents today for a follow-up appointment.    He reports satisfactory blood pressure control. His current medication regimen includes lisinopril 20 mg daily and metoprolol 25 mg daily.    He underwent an ablation procedure for atrial fibrillation in 04/2025, performed by Dr. Shelby, and has not experienced any recurrence of the condition since then. A follow-up visit with Dr. Andrzej Zuluaga is scheduled for 08/2025. He was able to discontinue amiodarone. He is currently on a half tablet of metoprolol and Eliquis, with the possibility of discontinuing these medications if his condition remains stable.    Recent blood work revealed elevated cholesterol levels, prompting a repeat test in 08/2025 to monitor any changes. He continues to take Repatha, Crestor, and aspirin.    His diabetes is well-managed through dietary control, with an A1c level of 5.9%. He is not currently on any antidiabetic medications.    He underwent a left knee replacement surgery in 01/2025, which was followed by a colonoscopy in 12/2024. He reports persistent swelling in the left knee, which he has been informed is a normal post-operative symptom. He is under the care of Dr. James for this issue and requires a referral for a follow-up appointment scheduled for 12/2025.    He has no history of prostate cancer.    PAST SURGICAL HISTORY:  - Left knee replacement surgery in 01/2025  - Ablation procedure for atrial fibrillation in 04/2025  - Colonoscopy in 12/2024         Current Outpatient Medications   Medication Instructions    aspirin 81 mg, Daily    B Complex-Biotin-FA (B Complete) tablet 1 tablet, Daily    Eliquis 5 mg, Oral, 2 Times Daily    lisinopril (PRINIVIL,ZESTRIL) 20 MG tablet TAKE 1 TABLET DAILY     "metoprolol succinate XL (TOPROL-XL) 50 mg, Oral, Daily    Repatha SureClick solution auto-injector SureClick injection INJECT 1 ML UNDER THE SKIN INTO THE APPROPRIATE AREA EVERY 14 DAYS AS DIRECTED    rosuvastatin (CRESTOR) 20 mg, Oral, Daily    sildenafil (VIAGRA) 100 mg, Daily PRN       The following portions of the patient's history were reviewed and updated as appropriate: allergies, current medications, past family history, past medical history, past social history, past surgical history, and problem list.    Objective   Vital Signs:   /78   Pulse 50   Temp 97.9 °F (36.6 °C) (Oral)   Ht 177.8 cm (70\")   Wt 93.8 kg (206 lb 11.2 oz)   SpO2 98%   BMI 29.66 kg/m²     BP Readings from Last 3 Encounters:   06/09/25 136/78   06/05/25 143/82   05/12/25 124/80     Wt Readings from Last 3 Encounters:   06/09/25 93.8 kg (206 lb 11.2 oz)   06/05/25 92.4 kg (203 lb 9.6 oz)   05/12/25 93.4 kg (206 lb)           Physical Exam  Vitals reviewed.   Constitutional:       Appearance: Normal appearance.   HENT:      Head: Normocephalic and atraumatic.      Right Ear: External ear normal.      Left Ear: External ear normal.   Eyes:      Conjunctiva/sclera: Conjunctivae normal.   Cardiovascular:      Rate and Rhythm: Normal rate and regular rhythm.      Heart sounds: No murmur heard.     No friction rub. No gallop.   Pulmonary:      Effort: Pulmonary effort is normal.      Breath sounds: Normal breath sounds. No wheezing or rhonchi.   Abdominal:      General: Bowel sounds are normal. There is no distension.      Palpations: Abdomen is soft.      Tenderness: There is no abdominal tenderness.   Skin:     General: Skin is warm and dry.   Neurological:      Mental Status: He is alert and oriented to person, place, and time.      Cranial Nerves: No cranial nerve deficit.   Psychiatric:         Mood and Affect: Mood and affect normal.         Behavior: Behavior normal.         Thought Content: Thought content normal.         " Judgment: Judgment normal.            Result Review :   The following data was reviewed by: Kurt Arango DO on 06/09/2025:  Common labs          1/18/2025    04:17 4/8/2025    08:45 5/30/2025    08:18   Common Labs   Glucose 210  119  118    BUN 19  14  12.0    Creatinine 1.07  1.25  0.77    Sodium 128  136  135    Potassium 5.0  4.9  4.7    Chloride 96  102  99    Calcium 8.2  9.3  9.3    Albumin   4.0    Total Bilirubin   0.4    Alkaline Phosphatase   97    AST (SGOT)   31    ALT (SGPT)   34    WBC 12.15  4.05  4.79    Hemoglobin 12.2  15.8  16.2    Hematocrit 36.7  48.5  49.5    Platelets 153  198  197    Total Cholesterol   216    Triglycerides   56    HDL Cholesterol   104    LDL Cholesterol    102    Hemoglobin A1C   5.90             Lab Results   Component Value Date    COVID19 NOT DETECTED 06/08/2020    INR 1.06 03/26/2024       Results  Labs   - Lipid Panel - LDL: 102 mg/dL   - Lipid Panel - HDL: 104 mg/dL   - A1c: 5.9%   - Thyroid Function Test: Normal   - Kidney Function Test: Normal   - Liver Function Test: Normal   - Sodium Level: 135 mmol/L   - Complete Blood Count (CBC): Normal   - PSA Level: 0.920 ng/mL    Imaging   - Colonoscopy: 12/2024, A few small mouth diverticula noted in the sigmoid colon, otherwise everything else looked normal    Procedures        Assessment and Plan    Diagnoses and all orders for this visit:    1. S/P total knee replacement, left (Primary)  -     Ambulatory Referral to Orthopedic Surgery    2. Essential hypertension  -     CBC & Differential; Future  -     Comprehensive Metabolic Panel; Future    3. Atrial fibrillation, persistent  Overview:  Cardoversion successful 3/24 started on a trail of amiodarone        4. S/P ablation of atrial fibrillation    5. CAD S/P percutaneous coronary angioplasty    6. Type 2 diabetes mellitus with hyperglycemia, without long-term current use of insulin  -     CBC & Differential; Future  -     Comprehensive Metabolic Panel; Future  -      Hemoglobin A1c; Future  -     Microalbumin / Creatinine Urine Ratio - Urine, Clean Catch; Future    7. Pure hypercholesterolemia    8. Diverticulosis    9. Need for Tdap vaccination    10. Screening for prostate cancer  -     PSA Screen; Future        Assessment & Plan  1. Hypertension.  - Blood pressure is well-controlled on the current regimen of lisinopril 20 mg daily and metoprolol 25 mg daily.    2. Atrial Fibrillation.  - He underwent an ablation in April 2025 and has not had a recurrence of atrial fibrillation.  - He is currently taking half a tablet of metoprolol 50 mg daily and Eliquis.  - A follow-up appointment with Dr. Andrzej Zuluaga is scheduled for August 2025, where discontinuation of metoprolol and Eliquis will be considered if his condition remains stable.    3. Hyperlipidemia.  - LDL cholesterol level is at 102, and HDL cholesterol level is at 104.  - He continues to take Repatha and Crestor.  - Repeat blood work is scheduled for August 2025 to monitor cholesterol levels.    4. Diabetes Mellitus.  - A1c level is at 5.9%, indicating good control with dietary measures alone.  - A microalbumin urine test will be conducted during the next visit.    5. Post-Operative Care.  - He had a left knee replacement in January 2025 and reports that the knee is still swollen, which is considered normal.  - A referral to Dr. James has been placed for further evaluation in December 2025.    6. Health Maintenance.  - Thyroid levels are within normal range. Kidney function and liver enzymes are also within normal limits. Sodium level is at 135, which is acceptable. Blood counts are within normal range.  - PSA level is at 0.920, which is below the desired level of less than 4.  - He underwent a colonoscopy in December 2024, which revealed a few small mouth diverticula in the sigmoid colon.  - The scar from the left knee replacement surgery appears to be healing well.  - Tetanus vaccination will be administered today,  and pneumonia vaccination will be considered during the next visit.  - PSA levels will continue to be monitored annually for prostate cancer screening.  - A repeat colonoscopy is recommended in 5 years.    Follow-up  - The patient will follow up in 6 months.       There are no discontinued medications.       Follow Up   Return in about 6 months (around 12/9/2025) for hypertension, diabetes.  Patient was given instructions and counseling regarding his condition or for health maintenance advice. Please see specific information pulled into the AVS if appropriate.     Patient or patient representative verbalized consent for the use of Ambient Listening during the visit with  Kurt Arango DO for chart documentation. 6/9/2025  08:26 EDT    Kurt Arango DO  06/09/25  08:37 EDT

## 2025-08-07 ENCOUNTER — CLINICAL SUPPORT (OUTPATIENT)
Dept: FAMILY MEDICINE CLINIC | Facility: CLINIC | Age: 65
End: 2025-08-07
Payer: OTHER GOVERNMENT

## 2025-08-07 DIAGNOSIS — E78.5 HYPERLIPIDEMIA LDL GOAL <70: ICD-10-CM

## 2025-08-07 LAB
ALBUMIN SERPL-MCNC: 4.1 G/DL (ref 3.5–5.2)
ALP SERPL-CCNC: 87 U/L (ref 39–117)
ALT SERPL W P-5'-P-CCNC: 33 U/L (ref 1–41)
AST SERPL-CCNC: 34 U/L (ref 1–40)
BILIRUB CONJ SERPL-MCNC: 0.2 MG/DL (ref 0–0.3)
BILIRUB INDIRECT SERPL-MCNC: 0.3 MG/DL
BILIRUB SERPL-MCNC: 0.5 MG/DL (ref 0–1.2)
CHOLEST SERPL-MCNC: 178 MG/DL (ref 0–200)
HDLC SERPL-MCNC: 102 MG/DL (ref 40–60)
LDLC SERPL CALC-MCNC: 67 MG/DL (ref 0–100)
LDLC/HDLC SERPL: 0.66 {RATIO}
PROT SERPL-MCNC: 7.1 G/DL (ref 6–8.5)
TRIGL SERPL-MCNC: 41 MG/DL (ref 0–150)
VLDLC SERPL-MCNC: 9 MG/DL (ref 5–40)

## 2025-08-07 PROCEDURE — 36415 COLL VENOUS BLD VENIPUNCTURE: CPT | Performed by: FAMILY MEDICINE

## 2025-08-07 PROCEDURE — 80076 HEPATIC FUNCTION PANEL: CPT | Performed by: NURSE PRACTITIONER

## 2025-08-07 PROCEDURE — 80061 LIPID PANEL: CPT | Performed by: NURSE PRACTITIONER

## 2025-08-08 ENCOUNTER — RESULTS FOLLOW-UP (OUTPATIENT)
Dept: CARDIOLOGY | Facility: CLINIC | Age: 65
End: 2025-08-08
Payer: OTHER GOVERNMENT

## 2025-08-26 ENCOUNTER — OFFICE VISIT (OUTPATIENT)
Age: 65
End: 2025-08-26
Payer: OTHER GOVERNMENT

## 2025-08-26 VITALS
WEIGHT: 209 LBS | DIASTOLIC BLOOD PRESSURE: 82 MMHG | SYSTOLIC BLOOD PRESSURE: 140 MMHG | HEART RATE: 58 BPM | HEIGHT: 70 IN | BODY MASS INDEX: 29.92 KG/M2

## 2025-08-26 DIAGNOSIS — I48.19 ATRIAL FIBRILLATION, PERSISTENT: ICD-10-CM

## 2025-08-26 DIAGNOSIS — I10 PRIMARY HYPERTENSION: Primary | ICD-10-CM

## 2025-08-26 PROCEDURE — 99213 OFFICE O/P EST LOW 20 MIN: CPT

## 2025-08-26 PROCEDURE — 93000 ELECTROCARDIOGRAM COMPLETE: CPT

## 2025-08-26 RX ORDER — LISINOPRIL 40 MG/1
40 TABLET ORAL DAILY
Qty: 90 TABLET | Refills: 1 | Status: SHIPPED | OUTPATIENT
Start: 2025-08-26

## (undated) DEVICE — Device: Brand: REFERENCE PATCH CARTO 3

## (undated) DEVICE — PLASMABLADE PS200-040 4.0: Brand: PLASMABLADE™

## (undated) DEVICE — SOL IRRG H2O PL/BG 1000ML STRL

## (undated) DEVICE — SUT VIC 3/0 SH 27IN J416H

## (undated) DEVICE — PERCLOSE™ PROSTYLE™ SUTURE-MEDIATED CLOSURE AND REPAIR SYSTEM: Brand: PERCLOSE™ PROSTYLE™

## (undated) DEVICE — SUT VIC 2/0 CT1 36IN

## (undated) DEVICE — GOWN,REINFORCE,POLY,SIRUS,BREATH SLV,XLG: Brand: MEDLINE

## (undated) DEVICE — APPL CHLORAPREP HI/LITE 26ML ORNG

## (undated) DEVICE — TOWEL,OR,DSP,ST,BLUE,STD,4/PK,20PK/CS: Brand: MEDLINE

## (undated) DEVICE — PENCL E/S SMOKEEVAC W/TELESCP CANN

## (undated) DEVICE — SUT MNCRYL PLS ANTIB UD 3/0 PS2 27IN

## (undated) DEVICE — GAUZE,SPONGE,4"X4",16PLY,STRL,LF,10/TRAY: Brand: MEDLINE

## (undated) DEVICE — LOU EP: Brand: MEDLINE INDUSTRIES, INC.

## (undated) DEVICE — BLD CUT FORMLA AGGR PLS 4.0MM

## (undated) DEVICE — Device: Brand: XPERIENCE

## (undated) DEVICE — SLV SCD KN/LEN ADJ EXPRSS BLENDED MD 1P/U

## (undated) DEVICE — KT SYR GEL ORISE SNGL PK 10ML

## (undated) DEVICE — Device: Brand: DEFENDO AIR/WATER/SUCTION AND BIOPSY VALVE

## (undated) DEVICE — GLV SURG SENSICARE PI PF LF 7 GRN STRL

## (undated) DEVICE — SOUNDSTAR ECO 8F G CATHETER: Brand: SOUNDSTAR

## (undated) DEVICE — SUT ETHLN 3-0 FS118IN 663H

## (undated) DEVICE — KNEE ARTHROSCOPY-LF: Brand: MEDLINE INDUSTRIES, INC.

## (undated) DEVICE — SUT MNCRYL 4/0 PS2 18 IN

## (undated) DEVICE — COLON KIT: Brand: MEDLINE INDUSTRIES, INC.

## (undated) DEVICE — 450 ML BOTTLE OF 0.05% CHLORHEXIDINE GLUCONATE IN 99.95% STERILE WATER FOR IRRIGATION, USP AND APPLICATOR.: Brand: IRRISEPT ANTIMICROBIAL WOUND LAVAGE

## (undated) DEVICE — INTENDED FOR TISSUE SEPARATION, AND OTHER PROCEDURES THAT REQUIRE A SHARP SURGICAL BLADE TO PUNCTURE OR CUT.: Brand: BARD-PARKER ® CARBON RIB-BACK BLADES

## (undated) DEVICE — STERILE POLYISOPRENE POWDER-FREE SURGICAL GLOVES WITH EMOLLIENT COATING: Brand: PROTEXIS

## (undated) DEVICE — PULLOVER TOGA, 2X LARGE: Brand: FLYTE, SURGICOOL

## (undated) DEVICE — GLV SURG BIOGEL LTX PF 8 1/2

## (undated) DEVICE — UNDERCAST PADDING: Brand: DEROYAL

## (undated) DEVICE — STRYKER PERFORMANCE SERIES SAGITTAL BLADE: Brand: STRYKER PERFORMANCE SERIES

## (undated) DEVICE — Device

## (undated) DEVICE — PINNACLE INTRODUCER SHEATH: Brand: PINNACLE

## (undated) DEVICE — 1 X VERSACROSS CONNECT TRANSSEPTAL DILATOR;  1 X VERSACROSS RF WIRE (INCLUDING 1 X CONNECTOR CABLE (SINGLE USE)): Brand: VERSACROSS CONNECT ACCESS SOLUTION FOR FARADRIVE

## (undated) DEVICE — DISPOSABLE TOURNIQUET CUFF 30"X4", 1-LINE, WHITE, STERILE, 1EA/PK, 10PK/CS: Brand: ASP MEDICAL

## (undated) DEVICE — DRAPE,TOWEL,LARGE,INVISISHIELD: Brand: MEDLINE

## (undated) DEVICE — HANDPIECE SET WITH HIGH FLOW TIP AND SUCTION TUBE: Brand: INTERPULSE

## (undated) DEVICE — ANTIBACTERIAL VIOLET BRAIDED (POLYGLACTIN 910), SYNTHETIC ABSORBABLE SURGICAL SUTURE: Brand: COATED VICRYL

## (undated) DEVICE — CONN JET HYDRA H20 AUXILIARY DISP

## (undated) DEVICE — GLV SURG SENSICARE SLT PF LF 7 STRL

## (undated) DEVICE — BNDG ELAS MATRX V/CLS 6INX10YD LF

## (undated) DEVICE — PROXIMATE RH ROTATING HEAD SKIN STAPLERS (35 WIDE) CONTAINS 35 STAINLESS STEEL STAPLES: Brand: PROXIMATE

## (undated) DEVICE — MAJOR-LF: Brand: MEDLINE INDUSTRIES, INC.

## (undated) DEVICE — 3 BONE CEMENT MIXER: Brand: MIXEVAC

## (undated) DEVICE — HYPODERMIC SAFETY NEEDLE: Brand: MONOJECT

## (undated) DEVICE — 90-S CRUISE, SUCTION PROBE, NON-BENDABLE, MAX CUT LEVEL 1: Brand: SERFAS ENERGY

## (undated) DEVICE — GW INQWIRE ROSEN/TIP PTFE FC J/TP/1.5MM 0.035IN 180CM

## (undated) DEVICE — STEERABLE SHEATH CLEAR: Brand: FARADRIVE™

## (undated) DEVICE — GW INQWIRE FC PTFE J/3MM .035 180

## (undated) DEVICE — FMS FLUID MANAGEMENT SYSTEM OUTFLOW TUBING WITHOUT ONE-WAY VALVE (FMS VUE OR FMS DUO PLUS): Brand: FMS

## (undated) DEVICE — SOL IRR NACL 0.9PCT BO 1000ML

## (undated) DEVICE — SPNG LAP PREWSH SFTPK 18X18IN STRL PK/5

## (undated) DEVICE — SOLIDIFIER LIQLOC PLS 1500CC BT

## (undated) DEVICE — NO-SCRATCH ™ SMALL WHITNEY CURETTE ™ IS A SINGLE-USE, PLASTIC CURETTE FOR QUICKLY APPLYING, MANIPULATING AND REMOVING BONE CEMENT DURING HIP AND KNEE REPLACEMENT SURGERY. THE PLASTIC IS SOFTER THAN STEEL INSTRUMENTS, REDUCING THE RISK OF DAMAGING THE PROSTHESIS WITH METAL INSTRUMENTS.  THE CURETTE’S 6MM TIP REMOVES EXCESS CEMENT FROM REPLACEMENT HIPS AND KNEES. EASY-TO-MANEUVER, THE SMALL BLUE CURETTE LETS YOU REMOVE CEMENT FROM ALL EDGES OF THE PROSTHESIS.NO-SCRATCH WHITNEY SMALL CURETTE FEATURES:SAFER THAN STEEL- MADE OF PLASTIC - STURDY YET SOFTER THAN SURGICAL STEEL.HANDIER- EACH TOOL HAS A MOLDED-IN THUMB INDENTATION INSTANTLY ORIENTING THE TOOL.- EASIER TO MANEUVER IN HARD TO SEE PLACES.- COLOR-CODED FOR EASY IDENTIFICATION.FASTER- COMES INDIVIDUALLY PACKAGED IN STERILE, PEEL OPEN POUCH, READY TO GO.- APPLIES, MANIPULATES, OR REMOVES CEMENT WITH FINGERTIP PRECISION.ECONOMICAL- THE COST OF A SINGLE REVISION DWARFS THE COST OF A SINGLE-USE CURETTE. - DISPOSABLE – THERE’S NO NEED TO WASTE TIME REMOVING HARDENED CEMENT OR RE-STERILIZING TOOLS.- LESS EXPENSIVE TO BUY AND INVENTORY - ORDER ONLY THE TOOL YOU USE.- PACKAGED 25 INDIVIDUALLY WRAPPED TOOLS TO A CARTON FOR CONVENIENT SHELF STORAGE.: Brand: WHITNEY NO-SCRATCH CURETTE (SMALL)

## (undated) DEVICE — GLOVE,SURG,SENSICARE SLT,LF,PF,7: Brand: MEDLINE

## (undated) DEVICE — TOTAL KNEE-LF: Brand: MEDLINE INDUSTRIES, INC.

## (undated) DEVICE — 3M™ STERI-STRIP™ REINFORCED ADHESIVE SKIN CLOSURES, R1547, 1/2 IN X 4 IN (12 MM X 100 MM), 6 STRIPS/ENVELOPE: Brand: 3M™ STERI-STRIP™

## (undated) DEVICE — OCTA,PERSEID,2-2-2-2-2,F-CURVE: Brand: OCTARAY MAPPING CATHETER

## (undated) DEVICE — OSC BEACH CHAIR SHOULDER-LF: Brand: MEDLINE INDUSTRIES, INC.

## (undated) DEVICE — ANTIBACTERIAL UNDYED BRAIDED (POLYGLACTIN 910), SYNTHETIC ABSORBABLE SUTURE: Brand: COATED VICRYL

## (undated) DEVICE — GOWN,REINF,POLY,SIRUS,BRTH SLV,XLNG/XXL: Brand: MEDLINE

## (undated) DEVICE — FMS FLUID MANAGEMENT SYSTEM INFLOW TUBING (FMS VUE): Brand: FMS

## (undated) DEVICE — NON-WOVEN ADHESIVE WOUND DRESSING: Brand: PRIMAPORE ADHESIVE DRESSING 10*8CM

## (undated) DEVICE — BUR BRL FORMLA 12FLUT 4MM

## (undated) DEVICE — NEEDLE,18GX1.5",REG,BEVEL: Brand: MEDLINE

## (undated) DEVICE — SHOULDER P.A.D. III: Brand: DEROYAL

## (undated) DEVICE — DRSNG WND GZ CURAD OIL EMULSION 3X3IN STRL

## (undated) DEVICE — MAT FLR ABS W/BLU/LINER 56X72IN WHT

## (undated) DEVICE — GLOVE,SURG,SENSICARE SLT,LF,PF,6.5: Brand: MEDLINE

## (undated) DEVICE — ELECTRD BLD EDGE COAT 3IN

## (undated) DEVICE — SNAR E/S POLYP SNAREMASTER OVL/10MM 2.8X2300MM YEL

## (undated) DEVICE — LINER SURG CANSTR SXN S/RIGD 1500CC

## (undated) DEVICE — BNDG ELAS CO-FLEX SLF ADHR 6IN 5YD LF STRL

## (undated) DEVICE — GLOVE,SURG,SENSICARE SLT,LF,PF,8.5: Brand: MEDLINE

## (undated) DEVICE — PULSED FIELD ABLATION CATHETER: Brand: FARAWAVE™

## (undated) DEVICE — SYR LUERLOK 30CC

## (undated) DEVICE — SOL IRR NACL 0.9PCT 3000ML

## (undated) DEVICE — CVR LEG BOOTLEG F/R NOSKID 33IN

## (undated) DEVICE — GLV SURG BIOGEL LTX PF 7 1/2

## (undated) DEVICE — BASIC SINGLE BASIN-LF: Brand: MEDLINE INDUSTRIES, INC.